# Patient Record
Sex: FEMALE | Race: WHITE | NOT HISPANIC OR LATINO | Employment: OTHER | URBAN - NONMETROPOLITAN AREA
[De-identification: names, ages, dates, MRNs, and addresses within clinical notes are randomized per-mention and may not be internally consistent; named-entity substitution may affect disease eponyms.]

---

## 2017-01-03 ENCOUNTER — HOSPITAL ENCOUNTER (OUTPATIENT)
Dept: RADIOLOGY | Facility: OTHER | Age: 62
End: 2017-01-03
Attending: ORTHOPAEDIC SURGERY

## 2017-01-03 ENCOUNTER — HISTORY (OUTPATIENT)
Dept: ORTHOPEDICS | Facility: OTHER | Age: 62
End: 2017-01-03

## 2017-01-03 ENCOUNTER — OFFICE VISIT - GICH (OUTPATIENT)
Dept: ORTHOPEDICS | Facility: OTHER | Age: 62
End: 2017-01-03

## 2017-01-03 DIAGNOSIS — S52.552D OTHER EXTRAARTICULAR FRACTURE OF LOWER END OF LEFT RADIUS, SUBSEQUENT ENCOUNTER FOR CLOSED FRACTURE WITH ROUTINE HEALING: ICD-10-CM

## 2017-01-10 ENCOUNTER — OFFICE VISIT - GICH (OUTPATIENT)
Dept: ORTHOPEDICS | Facility: OTHER | Age: 62
End: 2017-01-10

## 2017-01-10 ENCOUNTER — AMBULATORY - GICH (OUTPATIENT)
Dept: SCHEDULING | Facility: OTHER | Age: 62
End: 2017-01-10

## 2017-01-10 ENCOUNTER — COMMUNICATION - GICH (OUTPATIENT)
Dept: ORTHOPEDICS | Facility: OTHER | Age: 62
End: 2017-01-10

## 2017-01-10 DIAGNOSIS — S52.552D OTHER EXTRAARTICULAR FRACTURE OF LOWER END OF LEFT RADIUS, SUBSEQUENT ENCOUNTER FOR CLOSED FRACTURE WITH ROUTINE HEALING: ICD-10-CM

## 2017-01-11 ENCOUNTER — AMBULATORY - GICH (OUTPATIENT)
Dept: ORTHOPEDICS | Facility: OTHER | Age: 62
End: 2017-01-11

## 2017-01-11 DIAGNOSIS — S52.552D OTHER EXTRAARTICULAR FRACTURE OF LOWER END OF LEFT RADIUS, SUBSEQUENT ENCOUNTER FOR CLOSED FRACTURE WITH ROUTINE HEALING: ICD-10-CM

## 2017-01-17 ENCOUNTER — OFFICE VISIT - GICH (OUTPATIENT)
Dept: ORTHOPEDICS | Facility: OTHER | Age: 62
End: 2017-01-17

## 2017-01-17 ENCOUNTER — HOSPITAL ENCOUNTER (OUTPATIENT)
Dept: RADIOLOGY | Facility: OTHER | Age: 62
End: 2017-01-17
Attending: ORTHOPAEDIC SURGERY

## 2017-01-17 ENCOUNTER — HISTORY (OUTPATIENT)
Dept: ORTHOPEDICS | Facility: OTHER | Age: 62
End: 2017-01-17

## 2017-01-17 DIAGNOSIS — Z47.89 ENCOUNTER FOR OTHER ORTHOPEDIC AFTERCARE (CODE): ICD-10-CM

## 2017-01-17 DIAGNOSIS — S52.552D OTHER EXTRAARTICULAR FRACTURE OF LOWER END OF LEFT RADIUS, SUBSEQUENT ENCOUNTER FOR CLOSED FRACTURE WITH ROUTINE HEALING: ICD-10-CM

## 2017-01-30 ENCOUNTER — COMMUNICATION - GICH (OUTPATIENT)
Dept: ORTHOPEDICS | Facility: OTHER | Age: 62
End: 2017-01-30

## 2017-01-31 ENCOUNTER — OFFICE VISIT - GICH (OUTPATIENT)
Dept: ORTHOPEDICS | Facility: OTHER | Age: 62
End: 2017-01-31

## 2017-01-31 DIAGNOSIS — S52.532D CLOSED COLLES' FRACTURE OF LEFT RADIUS WITH ROUTINE HEALING: ICD-10-CM

## 2017-02-03 ENCOUNTER — AMBULATORY - GICH (OUTPATIENT)
Dept: ORTHOPEDICS | Facility: OTHER | Age: 62
End: 2017-02-03

## 2017-02-03 DIAGNOSIS — S52.552D OTHER EXTRAARTICULAR FRACTURE OF LOWER END OF LEFT RADIUS, SUBSEQUENT ENCOUNTER FOR CLOSED FRACTURE WITH ROUTINE HEALING: ICD-10-CM

## 2017-02-07 ENCOUNTER — HISTORY (OUTPATIENT)
Dept: ORTHOPEDICS | Facility: OTHER | Age: 62
End: 2017-02-07

## 2017-02-07 ENCOUNTER — HOSPITAL ENCOUNTER (OUTPATIENT)
Dept: RADIOLOGY | Facility: OTHER | Age: 62
End: 2017-02-07
Attending: ORTHOPAEDIC SURGERY

## 2017-02-07 ENCOUNTER — OFFICE VISIT - GICH (OUTPATIENT)
Dept: ORTHOPEDICS | Facility: OTHER | Age: 62
End: 2017-02-07

## 2017-02-07 DIAGNOSIS — S52.552D OTHER EXTRAARTICULAR FRACTURE OF LOWER END OF LEFT RADIUS, SUBSEQUENT ENCOUNTER FOR CLOSED FRACTURE WITH ROUTINE HEALING: ICD-10-CM

## 2017-02-07 DIAGNOSIS — S52.532D CLOSED COLLES' FRACTURE OF LEFT RADIUS WITH ROUTINE HEALING: ICD-10-CM

## 2017-02-13 ENCOUNTER — AMBULATORY - GICH (OUTPATIENT)
Dept: ORTHOPEDICS | Facility: OTHER | Age: 62
End: 2017-02-13

## 2017-02-13 DIAGNOSIS — S52.552D OTHER EXTRAARTICULAR FRACTURE OF LOWER END OF LEFT RADIUS, SUBSEQUENT ENCOUNTER FOR CLOSED FRACTURE WITH ROUTINE HEALING: ICD-10-CM

## 2017-02-27 ENCOUNTER — HISTORY (OUTPATIENT)
Dept: ORTHOPEDICS | Facility: OTHER | Age: 62
End: 2017-02-27

## 2017-02-27 ENCOUNTER — HOSPITAL ENCOUNTER (OUTPATIENT)
Dept: RADIOLOGY | Facility: OTHER | Age: 62
End: 2017-02-27
Attending: ORTHOPAEDIC SURGERY

## 2017-02-27 ENCOUNTER — OFFICE VISIT - GICH (OUTPATIENT)
Dept: ORTHOPEDICS | Facility: OTHER | Age: 62
End: 2017-02-27

## 2017-02-27 DIAGNOSIS — S52.552D OTHER EXTRAARTICULAR FRACTURE OF LOWER END OF LEFT RADIUS, SUBSEQUENT ENCOUNTER FOR CLOSED FRACTURE WITH ROUTINE HEALING: ICD-10-CM

## 2017-03-22 ENCOUNTER — AMBULATORY - GICH (OUTPATIENT)
Dept: ORTHOPEDICS | Facility: OTHER | Age: 62
End: 2017-03-22

## 2017-03-22 DIAGNOSIS — S52.552D OTHER EXTRAARTICULAR FRACTURE OF LOWER END OF LEFT RADIUS, SUBSEQUENT ENCOUNTER FOR CLOSED FRACTURE WITH ROUTINE HEALING: ICD-10-CM

## 2017-03-28 ENCOUNTER — HISTORY (OUTPATIENT)
Dept: ORTHOPEDICS | Facility: OTHER | Age: 62
End: 2017-03-28

## 2017-03-28 ENCOUNTER — HOSPITAL ENCOUNTER (OUTPATIENT)
Dept: RADIOLOGY | Facility: OTHER | Age: 62
End: 2017-03-28
Attending: ORTHOPAEDIC SURGERY

## 2017-03-28 ENCOUNTER — OFFICE VISIT - GICH (OUTPATIENT)
Dept: ORTHOPEDICS | Facility: OTHER | Age: 62
End: 2017-03-28

## 2017-03-28 DIAGNOSIS — S52.552D OTHER EXTRAARTICULAR FRACTURE OF LOWER END OF LEFT RADIUS, SUBSEQUENT ENCOUNTER FOR CLOSED FRACTURE WITH ROUTINE HEALING: ICD-10-CM

## 2017-05-26 ENCOUNTER — COMMUNICATION - GICH (OUTPATIENT)
Dept: FAMILY MEDICINE | Facility: OTHER | Age: 62
End: 2017-05-26

## 2017-05-26 DIAGNOSIS — K59.00 CONSTIPATION: ICD-10-CM

## 2017-10-25 ENCOUNTER — AMBULATORY - GICH (OUTPATIENT)
Dept: RADIOLOGY | Facility: OTHER | Age: 62
End: 2017-10-25

## 2017-10-25 DIAGNOSIS — R09.02 HYPOXEMIA: ICD-10-CM

## 2017-10-25 DIAGNOSIS — J98.6 DISORDER OF DIAPHRAGM: ICD-10-CM

## 2017-10-25 DIAGNOSIS — J47.0 BRONCHIECTASIS WITH ACUTE LOWER RESPIRATORY INFECTION (H): ICD-10-CM

## 2017-10-25 DIAGNOSIS — D89.9 DISORDER INVOLVING IMMUNE MECHANISM (H): ICD-10-CM

## 2017-10-25 DIAGNOSIS — R06.09 OTHER FORMS OF DYSPNEA: ICD-10-CM

## 2017-11-09 ENCOUNTER — HOSPITAL ENCOUNTER (OUTPATIENT)
Dept: RADIOLOGY | Facility: OTHER | Age: 62
End: 2017-11-09

## 2017-11-09 DIAGNOSIS — R06.09 OTHER FORMS OF DYSPNEA: ICD-10-CM

## 2017-11-09 DIAGNOSIS — J98.6 DISORDER OF DIAPHRAGM: ICD-10-CM

## 2017-11-09 DIAGNOSIS — J47.0 BRONCHIECTASIS WITH ACUTE LOWER RESPIRATORY INFECTION (H): ICD-10-CM

## 2017-11-09 DIAGNOSIS — D89.9 DISORDER INVOLVING IMMUNE MECHANISM (H): ICD-10-CM

## 2017-11-09 DIAGNOSIS — R09.02 HYPOXEMIA: ICD-10-CM

## 2017-12-13 ENCOUNTER — OFFICE VISIT - GICH (OUTPATIENT)
Dept: FAMILY MEDICINE | Facility: OTHER | Age: 62
End: 2017-12-13

## 2017-12-13 ENCOUNTER — HISTORY (OUTPATIENT)
Dept: FAMILY MEDICINE | Facility: OTHER | Age: 62
End: 2017-12-13

## 2017-12-13 DIAGNOSIS — L08.9 LOCAL INFECTION OF SKIN AND SUBCUTANEOUS TISSUE: ICD-10-CM

## 2018-01-02 NOTE — PROGRESS NOTES
"Patient Information     Patient Name MRN Sex Alicia Mena [id number] Female 1955      Progress Notes by Harvinder Mitchell DO at 1/3/2017  3:15 PM     Author:  Harvinder Mitchell DO Service:  (none) Author Type:  PHYS- Osteopathic     Filed:  1/3/2017  3:11 PM Encounter Date:  1/3/2017 Status:  Signed     :  Harvinder Mitchell DO (PHYS- Osteopathic)            PROGRESS NOTE    SUBJECTIVE:  Alicia Becker is here for recheck of a  left wrist.     HPI: 6 days status post closed reduction and casting of a left distal radius fracture   two-week status post left distal radius fracture with injury on 16.   The patient was hospitalized recently for pneumonia. Initial recommendation and plan for surgical intervention on the wrist was canceled because of pneumonia. At that time she underwent a closed reduction and short arm casting. She presents for recheck and x-ray today.   The patient has been doing well with the cast on. No major complaints. She has been moving the fingers. Elevating and icing as needed. .    Review of Systems:  Constitutional: Denies constitutional problems    PFSH:  No change in information. See earlier PFSH questionnaire completed by the patient on initial visit.    OBJECTIVE:  Visit Vitals       /72     Ht 1.6 m (5' 3\")     Wt 81.6 kg (180 lb)     BMI 31.89 kg/m2    Body mass index is 31.89 kg/(m^2).  General Appearance: Pleasant female in good appearance, mood and affect.  Alert and orientated times three ( time, date and location).    Left wrist/ Hand:  The short arm plaster cast is intact. Mild to moderate finger swelling. Active and passive motion of the fingers and thumb is comfortable.    Radiographic images where independently reviewed and discussed with the patient.   X RAY: X-rays of the left wrist today are compared to prior x-rays. Overall she still has good alignment of the reduction. It appears to be holding well at this point. The transverse fracture pattern with " dorsal comminution on the radius is noted.    ASSESSMENT:  Two-week status post left distal radius fracture, extra-articular with dorsal comminution  approximately 1 week status post closed reduction of the distal radius fracture.  Overall the reduction appears to be stable and in good position still.    PLAN:  Discussion included review of x-rays  continue with the current cast.  Discussed with the patient I will be out of town next week. If she has any cast problems she can still call the office.  Otherwise, plan to recheck and x-ray of the left wrist in 2 weeks. X-ray with the cast on.  Discussed with the patient she can still experience some settling or slight angulation of the fracture pattern but hopefully it will hold in good position.  Questions answered.    Harvinder Mitchell D.O.  Orthopedic Surgeon    [address] [address]  Phone [phone]  Fax [phone]    3:07 PM 1/3/2017

## 2018-01-02 NOTE — TELEPHONE ENCOUNTER
Patient Information     Patient Name MRN Alicia Xie [id number] Female 1955      Telephone Encounter by Stefanie Singh at 1/10/2017 11:01 AM     Author:  Stefanie Singh Service:  (none) Author Type:  (none)     Filed:  1/10/2017 11:04 AM Encounter Date:  1/10/2017 Status:  Signed     :  Stefanie Singh            Patient called with concerns that she is having pain in her thumb.  She stated that the pain is not getting any better even with ice.  I let her know that Dr. Mitchell was out this week.  She is wanting it checked this week.  Please advise.  Stefanie Singh LPN .......1/10/2017 11:03 AM

## 2018-01-02 NOTE — NURSING NOTE
Patient Information     Patient Name MRN Sex Alicia Mena [id number] Female 1955      Nursing Note by Kelsi Hinojosa at 1/3/2017  3:15 PM     Author:  Kelsi Hinojosa Service:  (none) Author Type:  (none)     Filed:  1/3/2017  2:22 PM Encounter Date:  1/3/2017 Status:  Signed     :  Kelsi Hinojosa            Pt presents for a follow up on her left wrist fracture, doi     Kelsi Hinojosa CMA 1/3/2017 2:20 PM

## 2018-01-03 NOTE — PROGRESS NOTES
Patient Information     Patient Name MRN Sex Alicia Mena [id number] Female 1955      Progress Notes by Stefanie Singh at 1/10/2017  2:00 PM     Author:  Stefanie Singh Service:  (none) Author Type:  (none)     Filed:  1/10/2017  2:40 PM Encounter Date:  1/10/2017 Status:  Signed     :  Stefanie Singh            Patient came in because she had concerns about her thumb hurting her.  She stated that it started to hurt yesterday more and that it did not get better with using elevation and ice.  Had her come in to check the cast to make sure that wasn't  Causing her the pain.  The cast was not too tight or too loose.  Capillary refill normal.  Patient did state that the pain was better now after taking Tylenol before coming in.  I had Dr. Gold talk to her and will have her follow up with Dr. Mitchell next week when he returns.  Told her to continue to ice, elevate, and use the Tylenol as needed for the pain.  Stefanie Singh LPN .......1/10/2017 2:37 PM

## 2018-01-03 NOTE — PROGRESS NOTES
Patient Information     Patient Name MRN Sex Alicia Mena [id number] Female 1955      Progress Notes by Harvinder Mitchell DO at 2017  3:15 PM     Author:  Harvinder Mitchell DO Service:  (none) Author Type:  PHYS- Osteopathic     Filed:  2017  4:21 PM Encounter Date:  2017 Status:  Signed     :  Harvinder Mitchell DO (PHYS- Osteopathic)            PROGRESS NOTE    SUBJECTIVE:  Alicia Becker is here for recheck of a  left wrist.     HPI: This patient had a left wrist distal radius fracture on . Initial plan was for surgical intervention. She was hospitalized with pneumonia and underwent a closed reduction and casting of the wrist on . She presents for recheck and x-ray. . she is having cast irritation at the thumb. The cast has slid down distally. Notices swelling of the thumb and some intermittent tingling.     Review of Systems:  Constitutional: Denies constitutional problems    PFSH:  No change in information. See earlier PFSH questionnaire completed by the patient on initial visit.    OBJECTIVE:  Visit Vitals       /78     Pulse 88    There is no height or weight on file to calculate BMI.  General Appearance: Pleasant female in good appearance, mood and affect.  Alert and orientated times three ( time, date and location).    Left wrist/ Hand:  The cast has slid distally about an inch to an inch and a half. It is causing pressure on the dorsal aspect of the thumb with associated swelling.    Radiographic images where independently reviewed and discussed with the patient.   X RAY: X-rays today in the cast demonstrate similar position of the reduction of the distal radius. Dorsal comminution.     ASSESSMENT:  3 weeks status post close reduction and casting of a left wrist distal radius fracture.  History of injury about 4 weeks ago on 16.  Overall position of the reduction looks similar.  Cast irritation. The cast has migrated distally and causing pressure and  has looseness to it.    PLAN:  I remove the cast. The patient was held with the wrist volar flexed during the procedure as a new short arm fiberglass cast was applied. It was molded and the patient appeared comfortable.  Plan to have the patient come back in 3 weeks. Cast removal and x-ray of the wrist.  She will see Dr. Gold in my absence at that time. I discussed with the patient I will be off on medical leave for 3 months beginning and of January.  Questions answered.    Harvinder Mitchell D.O.  Orthopedic Surgeon    [address] [address]  Phone [phone]  Fax [phone]    4:17 PM 1/17/2017

## 2018-01-03 NOTE — NURSING NOTE
Patient Information     Patient Name MRN Sex Alicia Mena [id number] Female 1955      Nursing Note by Stefanie Singh at 1/10/2017  2:00 PM     Author:  Stefanie Singh Service:  (none) Author Type:  (none)     Filed:  1/10/2017  2:40 PM Encounter Date:  1/10/2017 Status:  Signed     :  Stefanie Singh            Patient came in because she had concerns about her thumb hurting her.  She stated that it started to hurt yesterday more and that it did not get better with using elevation and ice.  Had her come in to check the cast to make sure that wasn't  Causing her the pain.  The cast was not too tight or too loose.  Capillary refill normal.  Patient did state that the pain was better now after taking Tylenol before coming in.  I had Dr. Gold talk to her and will have her follow up with Dr. Mitchell next week when he returns.  Told her to continue to ice, elevate, and use the Tylenol as needed for the pain.  Stefanie Singh LPN .......1/10/2017 2:37 PM

## 2018-01-03 NOTE — NURSING NOTE
Patient Information     Patient Name MRN Sex Alicia Mena [id number] Female 1955      Nursing Note by Stefanie Singh at 2017  3:15 PM     Author:  Stefanie Singh Service:  (none) Author Type:  (none)     Filed:  2017  3:15 PM Encounter Date:  2017 Status:  Signed     :  Stefanie Singh            Patient is here for her follow up on her left wrist fx.  DOI: 17  Stefanie Singh LPN .......2017 3:14 PM

## 2018-01-03 NOTE — PROGRESS NOTES
Patient Information     Patient Name MRN Sex Alicia Mena [id number] Female 1955      Progress Notes by Stefanie Singh at 2017  1:00 PM     Author:  Stefanie Singh Service:  (none) Author Type:  (none)     Filed:  2017  2:09 PM Encounter Date:  2017 Status:  Signed     :  Stefanie Singh            Patient came in today because she was having discomfort around her thumb.  She stated that the cast felt like it was rubbing on her skin and making it raw feeling.  Trimmed cast around base of left thumb to keep it from rubbing anymore.  It had rubbed the base of her thumb and made the skin reddened.  Skin intact.  Mole skin placed around thumb to hold padding and to keep comfortable til she comes in next week.   Stefanie Singh LPN .......2017 2:06 PM

## 2018-01-03 NOTE — PROGRESS NOTES
Patient Information     Patient Name MRN Sex Alicia Mena [id number] Female 1955      Progress Notes by Paul Gold DO at 2017  3:15 PM     Author:  Paul Gold DO Service:  (none) Author Type:  Physician     Filed:  2017  5:34 PM Encounter Date:  2017 Status:  Signed     :  Paul Gold DO (Physician)            PROGRESS NOTE    SUBJECTIVE:  Alicia Becker is here for evaluation in regards to her left wrist. I am following the patient for Dr. Mitchell in his absence. Patient suffered a left wrist fracture on 16. She had underwent closed reduction and casting and has been followed since that time. She is getting along relatively well. She is now 6 weeks out from left wrist injury.    OBJECTIVE:  Visit Vitals       /82     Pulse 88     Wt 81.6 kg (180 lb)     BMI 31.89 kg/m2    Body mass index is 31.89 kg/(m^2).    General Appearance: Pleasant female in good appearance, mood and affect.  Alert and orientated times three ( time, date and location).    Skin: Dryness is noted but no breakdown of the skin.    Wrist:  Positive palpable pain.  Motion: There is stiffness into the wrist due to being casted.  Tinel's: positive  Phalen's: Not tested.  Compression test: positive    Shoulder:  Motion: full    Elbow:  Flexion: Normal  Extension: Normal    Hand:  Thenar wasting: no  Hypothenar wasting: no  Sensation: Abnormal into the median nerve distribution especially thumb.  Radial and ulnar blood flow:  Normal    Eyes: Pupils are round.    Ears: Hearing: Intact    Heart: regular rate and rhythm    Lungs: Clear.     Radiographic images from ,  where independently reviewed and discussed with the patient.     X-rays today show a fracture of the distal radius that is intra-articular on the left. There is callus formation.    Exam: XR WRIST 3 VIEWS LEFT  History: Other closed extra-articular fracture of distal end of left radius with routine  healing, subsequent encounter  Technique: 3 views.  Findings: Comparison is made with the prior exam dated 01/17/2017. The plaster cast has been removed.  There is a healing fracture through the distal radius without significant change in position or alignment.    Impression: Healing distal radius fracture.  Electronically Signed By: Beryl Maradiaga M.D. on 2/7/2017 3:32 PM    PROCEDURE: XR WRIST 3 VIEWS LEFT  HISTORY: Other closed extra-articular fracture of distal end of left radius with routine healing, subsequent encounter.  COMPARISON: 01/03/2017  TECHNIQUE: 3 views of the left wrist were obtained.  FINDINGS: Comminuted fracture of the distal radius is again identified, with increasing lucency at the fracture site, consistent with interval healing response. There has been no change in alignment. A cast is present which obscures underlying detail.  IMPRESSION: Healing distal radius fracture with no change in alignment.  Electronically Signed By: Leidy Grant M.D. on 1/17/2017 3:17 PM    ASSESSMENT     Intra-articular fracture of the distal left radius (DOS 12/27/16).    PLAN:    I discussed conservative and surgical options at this time continue with casting.  She is to elevate, ice often and rest it.  She will follow-up in approximately 3 weeks for cast removal and x-ray first.  Questions and concerns answered.    PROCEDURAL NOTE:    Risks, benefits, conservative, surgical, and alternatives of treatment were thoroughly outlined. No guarantees were given. Risks which do include, but are not limited to:  Scar, infection, decreased motion, damage to blood vessels, nerves and tendons, failure or need for further treatment, reaction to medications and anesthesia, blood clots, and the possibility of death where discussed.  She did verbalize an understanding. All questions and concerns were addressed.    Patient was placed into a left short arm fiberglass cast that was molded appropriately.  She  tolerated the  procedure well without complications.     Cast care instructions where given.     Follow up as ordered.    Paul Gold D.O.  Orthopaedic Surgeon    [facility]  [address] [place] [address]  Phone [phone] (KNEE)  Fax [phone]    This document was created using computer generated templates and voice activated software.    5:30 PM 2/7/2017

## 2018-01-03 NOTE — TELEPHONE ENCOUNTER
Patient Information     Patient Name MRN Sex Alicia Mena [id number] Female 1955      Telephone Encounter by Stefanie Singh at 2017  3:05 PM     Author:  Stefanie Singh Service:  (none) Author Type:  (none)     Filed:  2017  3:16 PM Encounter Date:  2017 Status:  Signed     :  Stefanie Singh            Called patient back after talking with Dr. Gold.  He stated that she should come in to have it checked and cut the cast a little more by the thumb.  She was unable to come in until tomorrow.  The roads were to bad for her to come in today.  Stefanie Singh LPN .......2017 3:06 PM

## 2018-01-03 NOTE — NURSING NOTE
Patient Information     Patient Name MRN Sex Alicia Mena [id number] Female 1955      Nursing Note by Nicolás Hidalgo at 2017  3:15 PM     Author:  Nicolás Hidalgo Service:  (none) Author Type:  (none)     Filed:  2017  3:06 PM Encounter Date:  2017 Status:  Signed     :  Nicolás Hidalgo            Pt here for follow up, left wrist Fx. DOI .  Nicolás Hidalgo LPN .............2017  3:06 PM

## 2018-01-03 NOTE — NURSING NOTE
Patient Information     Patient Name MRN Sex Alicia Mena [id number] Female 1955      Nursing Note by Stefanie Singh at 2017  1:00 PM     Author:  Stefanie Singh Service:  (none) Author Type:  (none)     Filed:  2017  2:09 PM Encounter Date:  2017 Status:  Signed     :  Stefanie Singh            Patient came in today because she was having discomfort around her thumb.  She stated that the cast felt like it was rubbing on her skin and making it raw feeling.  Trimmed cast around base of left thumb to keep it from rubbing anymore.  It had rubbed the base of her thumb and made the skin reddened.  Skin intact.  Mole skin placed around thumb to hold padding and to keep comfortable til she comes in next week.   Stefanie Singh LPN .......2017 2:06 PM

## 2018-01-03 NOTE — TELEPHONE ENCOUNTER
Patient Information     Patient Name MRN Sex Alicia Mena [id number] Female 1955      Telephone Encounter by Kelsi Hinojosa at 1/10/2017 11:28 AM     Author:  Kelsi Hinojosa Service:  (none) Author Type:  (none)     Filed:  1/10/2017 11:29 AM Encounter Date:  1/10/2017 Status:  Signed     :  Kelsi Hinojosa            Pt will come in this afternoon for a cast check. Dr. Gold may see pt if there are concerns.    Kelsi Hinojosa CMA 1/10/2017 11:29 AM

## 2018-01-03 NOTE — NURSING NOTE
Patient Information     Patient Name MRN Sex Alicia Mena [id number] Female 1955      Nursing Note by Stefanie Singh at 2017  1:45 PM     Author:  Stefanie Singh Service:  (none) Author Type:  (none)     Filed:  2017  1:35 PM Encounter Date:  2017 Status:  Signed     :  Stefanie Singh            Patient is here for her follow up on her left wrist.  DOI: 17  Stefanie Singh LPN .......2017 1:35 PM

## 2018-01-03 NOTE — PROGRESS NOTES
Patient Information     Patient Name MRN Sex Alicia Mena [id number] Female 1955      Progress Notes by Paul Gold DO at 2017  1:45 PM     Author:  Paul Gold DO Service:  (none) Author Type:  Physician     Filed:  2017  2:14 PM Encounter Date:  2017 Status:  Signed     :  Paul Gold DO (Physician)            PROGRESS NOTE    SUBJECTIVE:  Alicia Becker is here for evaluation in regards to her left wrist. She is now about 9 weeks out from a fracture of the distal left radius.  She feels the stiffness having come out of her cast today and has expected discomfort. I am seeing the patient or Dr. Mitchell during his absence.    OBJECTIVE:  Visit Vitals       /70     Pulse 88     Wt 81.6 kg (180 lb)     BMI 31.89 kg/m2    Body mass index is 31.89 kg/(m^2).    General Appearance: Pleasant female in good appearance, mood and affect.  Alert and orientated times three ( time, date and location).    Skin: Dryness is noted but no breakdown of the skin.    Wrist:  Positive palpable pain, minimal.  Motion: There is stiffness into the wrist due to being casted.  Tinel's: positive  Phalen's: Not tested.  Compression test: positive    Shoulder:  Motion: full    Elbow:  Flexion: Normal  Extension: Normal    Hand:  Thenar wasting: no  Hypothenar wasting: no  Sensation: Abnormal into the median nerve distribution especially thumb, this has improved.  Radial and ulnar blood flow:  Normal    Eyes: Pupils are round.    Ears: Hearing: Intact    Heart: regular rate and rhythm    Lungs: Clear.     Radiographic images from ,  where independently reviewed and discussed with the patient.     X-rays today show a fracture of the distal radius that is intra-articular on the left. There is callus formation.    Exam: XR WRIST 3 VIEWS LEFT  History: Other closed extra-articular fracture of distal end of left radius with routine healing, subsequent  encounter  Technique: 3 views.  Findings: Comparison is made with the prior exam dated 01/17/2017. The plaster cast has been removed.  There is a healing fracture through the distal radius without significant change in position or alignment.    Impression: Healing distal radius fracture.  Electronically Signed By: Beryl Maradiaga M.D. on 2/7/2017 3:32 PM    PROCEDURE: XR WRIST 3 VIEWS LEFT  HISTORY: Other closed extra-articular fracture of distal end of left radius with routine healing, subsequent encounter.  COMPARISON: 01/03/2017  TECHNIQUE: 3 views of the left wrist were obtained.  FINDINGS: Comminuted fracture of the distal radius is again identified, with increasing lucency at the fracture site, consistent with interval healing response. There has been no change in alignment. A cast is present which obscures underlying detail.  IMPRESSION: Healing distal radius fracture with no change in alignment.  Electronically Signed By: Leidy Grant M.D. on 1/17/2017 3:17 PM    ASSESSMENT     Intra-articular fracture of the distal left radius (DOS 12/27/16).    PLAN:    I discussed conservative and surgical options at this time continue with conservative measures which will include early range of motion exercises and a carpal tunnel splint to be used sparingly.  She is to elevate, ice often and rest it.  She will follow-up in approximately 4 weeks for x-ray first.  She was given the instructions written out for range of motion and myself.  Questions and concerns answered.    Paul Gold D.O.  Orthopaedic Surgeon    [facility] and [address] [address]  Phone [phone] (KNEE)  Fax [phone]    This document was created using computer generated templates and voice activated software.    2:11 PM 2/27/2017

## 2018-01-04 NOTE — NURSING NOTE
Patient Information     Patient Name MRN Sex Alicia Mena [id number] Female 1955      Nursing Note by Kelsi Hinojosa at 3/28/2017  2:30 PM     Author:  Kelsi Hinojosa Service:  (none) Author Type:  (none)     Filed:  3/28/2017  2:08 PM Encounter Date:  3/28/2017 Status:  Signed     :  Kelsi Hinojosa            Pt presents for a follow up on her left wrist fracture. DOI  Dr. Mitchell pt.     Kelsi Hinojosa CMA 3/28/2017 2:07 PM

## 2018-01-04 NOTE — PROGRESS NOTES
Patient Information     Patient Name MRN Sex Alicia Mena [id number] Female 1955      Progress Notes by Paul Gold DO at 3/28/2017  2:30 PM     Author:  Paul Gold DO Service:  (none) Author Type:  Physician     Filed:  3/28/2017  2:46 PM Encounter Date:  3/28/2017 Status:  Signed     :  Paul Gold DO (Physician)            PROGRESS NOTE    SUBJECTIVE:  Alicia Becker is here for evaluation in regards to his left wrist. She is 14 weeks since suffering a fracture of the left radius. I have been following the patient for Dr. Mitchell. She has been doing well has been utilizing her splint a little bit more than I would've hoped that she was concerned about overdoing it so it's not the end of the world. She has some stiffness she took a tumble and irritated her left shoulder. Otherwise no complaints.    OBJECTIVE:  Visit Vitals       /76     Pulse 76     Wt 81.6 kg (180 lb)     BMI 31.89 kg/m2    Body mass index is 31.89 kg/(m^2).    General Appearance: Pleasant female in good appearance, mood and affect.  Alert and orientated times three ( time, date and location).    Skin: Dryness is noted but no breakdown of the skin.    Wrist:  No palpable pain.  Motion: There is stiffness into the wrist, but this has improved with her motion exercises.  Tinel's: positive  Phalen's: Not tested.  Compression test: positive    Shoulder:  Motion: full    Elbow:  Flexion: Normal  Extension: Normal    Hand:  Thenar wasting: no  Hypothenar wasting: no  Sensation: Abnormal into the median nerve distribution especially thumb, this has improved.  Radial and ulnar blood flow:  Normal    Eyes: Pupils are round.    Ears: Hearing: Intact    Heart: regular rate and rhythm    Lungs: Clear.     Radiographic images from , , 3/28 where independently reviewed and discussed with the patient.     Today's x-rays do show increased healing of the left distal radius. Remodeling is  occurring.    PROCEDURE: XR WRIST 3 VIEWS LEFT  HISTORY: Other closed extra-articular fracture of distal end of left radius with routine healing, subsequent encounter.  COMPARISON: 02/27/2017  TECHNIQUE: 3 views of the left wrist were obtained.  FINDINGS: There is a healing, transverse fracture of the distal radius. There is increasing sclerosis at the fracture site. Fracture line is still visible. There has been no change in alignment.  IMPRESSION: Healing distal radius fracture.  Electronically Signed By: Leidy Grant M.D. on 3/28/2017 2:37 PM    Exam: XR WRIST 3 VIEWS LEFT  History: Other closed extra-articular fracture of distal end of left radius with routine healing, subsequent encounter  Technique: 3 views.  Findings: Comparison is made with the prior exam dated 01/17/2017. The plaster cast has been removed.  There is a healing fracture through the distal radius without significant change in position or alignment.    Impression: Healing distal radius fracture.  Electronically Signed By: Beryl Maradiaga M.D. on 2/7/2017 3:32 PM    PROCEDURE: XR WRIST 3 VIEWS LEFT  HISTORY: Other closed extra-articular fracture of distal end of left radius with routine healing, subsequent encounter.  COMPARISON: 01/03/2017  TECHNIQUE: 3 views of the left wrist were obtained.  FINDINGS: Comminuted fracture of the distal radius is again identified, with increasing lucency at the fracture site, consistent with interval healing response. There has been no change in alignment. A cast is present which obscures underlying detail.  IMPRESSION: Healing distal radius fracture with no change in alignment.  Electronically Signed By: Leidy Grnat M.D. on 1/17/2017 3:17 PM    ASSESSMENT     Intra-articular fracture of the distal left radius (DOS 12/27/16).    PLAN:    I discussed conservative and surgical options at this time continue with conservative measures.  She is to elevate, ice often and rest it as needed.  She will  continue to work on her range of motion exercises.  Questions and concerns answered.  Follow-up as needed.    Paul Gold D.O.  Orthopaedic Surgeon    [facility]  [address] [place] [address]  Phone [phone] (KNEE)  Fax [phone]    This document was created using computer generated templates and voice activated software.    2:43 PM 3/28/2017

## 2018-01-05 NOTE — TELEPHONE ENCOUNTER
Patient Information     Patient Name MRN Sex Alicia Mena [id number] Female 1955      Telephone Encounter by Marielena Schrader RN at 2017  3:25 PM     Author:  Marielena Schrader RN Service:  (none) Author Type:  NURS- Registered Nurse     Filed:  2017  3:30 PM Encounter Date:  2017 Status:  Signed     :  Marielena Schrader RN (NURS- Registered Nurse)            Not a gi patient.  Unable to complete prescription refill per RN Medication Refill Policy.................... MARIELENA SCHRADER RN ....................  2017   3:25 PM

## 2018-01-27 VITALS
BODY MASS INDEX: 31.89 KG/M2 | DIASTOLIC BLOOD PRESSURE: 82 MMHG | DIASTOLIC BLOOD PRESSURE: 78 MMHG | WEIGHT: 180 LBS | HEART RATE: 88 BPM | SYSTOLIC BLOOD PRESSURE: 142 MMHG | HEART RATE: 88 BPM | SYSTOLIC BLOOD PRESSURE: 130 MMHG

## 2018-01-27 VITALS
BODY MASS INDEX: 31.89 KG/M2 | WEIGHT: 180 LBS | SYSTOLIC BLOOD PRESSURE: 118 MMHG | DIASTOLIC BLOOD PRESSURE: 76 MMHG | HEART RATE: 76 BPM

## 2018-01-27 VITALS
BODY MASS INDEX: 31.89 KG/M2 | HEART RATE: 88 BPM | WEIGHT: 180 LBS | SYSTOLIC BLOOD PRESSURE: 128 MMHG | DIASTOLIC BLOOD PRESSURE: 70 MMHG

## 2018-01-27 VITALS
BODY MASS INDEX: 31.89 KG/M2 | HEIGHT: 63 IN | DIASTOLIC BLOOD PRESSURE: 72 MMHG | SYSTOLIC BLOOD PRESSURE: 130 MMHG | WEIGHT: 180 LBS

## 2018-01-31 ENCOUNTER — DOCUMENTATION ONLY (OUTPATIENT)
Dept: FAMILY MEDICINE | Facility: OTHER | Age: 63
End: 2018-01-31

## 2018-01-31 RX ORDER — MONTELUKAST SODIUM 10 MG/1
10 TABLET ORAL AT BEDTIME
Status: ON HOLD | COMMUNITY
End: 2023-02-16

## 2018-01-31 RX ORDER — IPRATROPIUM BROMIDE AND ALBUTEROL SULFATE 2.5; .5 MG/3ML; MG/3ML
SOLUTION RESPIRATORY (INHALATION)
Status: ON HOLD | COMMUNITY
End: 2019-01-27

## 2018-01-31 RX ORDER — ASCORBIC ACID 500 MG
500 TABLET ORAL DAILY
COMMUNITY

## 2018-01-31 RX ORDER — LACOSAMIDE 200 MG/1
200 TABLET ORAL 2 TIMES DAILY
COMMUNITY
Start: 2016-10-05

## 2018-01-31 RX ORDER — PREDNISONE 5 MG/1
TABLET ORAL
COMMUNITY

## 2018-01-31 RX ORDER — AMLODIPINE BESYLATE 10 MG/1
0.5 TABLET ORAL DAILY
Status: ON HOLD | COMMUNITY
Start: 2016-11-15 | End: 2019-01-23

## 2018-01-31 RX ORDER — PREDNISONE 1 MG/1
TABLET ORAL
COMMUNITY

## 2018-01-31 RX ORDER — OMEPRAZOLE 40 MG/1
1 CAPSULE, DELAYED RELEASE ORAL DAILY
Status: ON HOLD | COMMUNITY
Start: 2016-11-08 | End: 2019-01-23

## 2018-01-31 RX ORDER — METFORMIN HCL 500 MG
1000 TABLET, EXTENDED RELEASE 24 HR ORAL
Status: ON HOLD | COMMUNITY
Start: 2016-12-27 | End: 2019-01-23

## 2018-01-31 RX ORDER — OXYMETAZOLINE HYDROCHLORIDE 0.05 G/100ML
1 SPRAY NASAL
Status: ON HOLD | COMMUNITY
End: 2019-01-23

## 2018-01-31 RX ORDER — LOSARTAN POTASSIUM 100 MG/1
100 TABLET ORAL DAILY
Status: ON HOLD | COMMUNITY
Start: 2016-11-15 | End: 2023-02-16

## 2018-01-31 RX ORDER — AMOXICILLIN 250 MG
1 CAPSULE ORAL 2 TIMES DAILY PRN
Status: ON HOLD | COMMUNITY
Start: 2016-12-28 | End: 2019-01-23

## 2018-01-31 RX ORDER — ALBUTEROL SULFATE 90 UG/1
1-2 AEROSOL, METERED RESPIRATORY (INHALATION) EVERY 4 HOURS PRN
COMMUNITY
Start: 2016-12-10

## 2018-01-31 RX ORDER — POTASSIUM CHLORIDE 1500 MG/1
1 TABLET, EXTENDED RELEASE ORAL 2 TIMES DAILY WITH MEALS
COMMUNITY
Start: 2016-09-21

## 2018-01-31 RX ORDER — OXCARBAZEPINE 600 MG/1
2 TABLET, FILM COATED ORAL 2 TIMES DAILY
COMMUNITY
Start: 2016-11-15

## 2018-01-31 RX ORDER — ASPIRIN 81 MG/1
81 TABLET, CHEWABLE ORAL DAILY
COMMUNITY

## 2018-01-31 RX ORDER — SIMVASTATIN 20 MG
1 TABLET ORAL AT BEDTIME
COMMUNITY
Start: 2016-10-18

## 2018-01-31 RX ORDER — FOLIC ACID 1 MG/1
1 TABLET ORAL DAILY
COMMUNITY
Start: 2016-11-15

## 2018-01-31 RX ORDER — VITAMIN E 268 MG
400 CAPSULE ORAL DAILY
COMMUNITY

## 2018-01-31 RX ORDER — ACETAMINOPHEN 500 MG
TABLET ORAL
COMMUNITY

## 2018-01-31 RX ORDER — SODIUM CHLORIDE 1 G/1
4 TABLET ORAL
COMMUNITY
Start: 2016-11-23

## 2018-02-09 VITALS
RESPIRATION RATE: 20 BRPM | BODY MASS INDEX: 32.11 KG/M2 | HEART RATE: 70 BPM | HEIGHT: 63 IN | SYSTOLIC BLOOD PRESSURE: 128 MMHG | WEIGHT: 181.2 LBS | DIASTOLIC BLOOD PRESSURE: 82 MMHG | TEMPERATURE: 98.4 F

## 2018-02-12 NOTE — PROGRESS NOTES
"Patient Information     Patient Name MRN Sex Alicia Mena [id number] Female 1955      Progress Notes by Danielle Jose NP at 2017  2:45 PM     Author:  Danielle Jose NP Service:  (none) Author Type:  PHYS- Nurse Practitioner     Filed:  2017  9:46 AM Encounter Date:  2017 Status:  Signed     :  Danielle Jose NP (PHYS- Nurse Practitioner)            HPI:  Nursing Notes:   Komal Hernandez  2017  2:38 PM  Signed  Patient presents to the clinic for infected second toe on left foot. Has been infected for a while now. Had it drained in [place] on  and now it's \"puss filled, white and painful\", again.   Komal Hernandez LPN............................ 2017 2:07 PM      Alicia Becker is a 62 y.o. female who presents to clinic today for second toe on left foot has a callus, thinks it may be infected. This callus was recently drained cultured and found to be infected with staph. Was treated for infection (not certain which antibiotic) and toe sort of improved but has been draining fluid since that time. Toe is getting increasingly sore and painful, with redness around callused area. History of diabetes and vasculitis, takes prednisone and methotrexate. Denies fevers.    No past medical history on file.  No past surgical history on file.  Social History     Substance Use Topics       Smoking status: Former Smoker     Smokeless tobacco: Never Used     Alcohol use No     Current Outpatient Prescriptions       Medication  Sig Dispense Refill     acetaminophen (TYLENOL EXTRA STRGTH) 500 mg tablet Take 2 tablets by mouth every night at bedtime, also may take 2 tablets by mouth every 6 hours as needed for pain. Max acetaminophen dose: 4000mg in 24 hrs.       albuterol-ipratropium (DUONEB) (2.5-0.5 mg) in 3 mL NEBULIZATION solution Inhale one neb by mouth once daily. When patient has a respiratory illness may increase to 2-3 times " "daily.       amLODIPine (NORVASC) 10 mg tablet TAKE 1/2 TABLET BY MOUTH DAILY.  3     ascorbic acid, vitamin C, (VITAMIN C) 500 mg tablet Take 500 mg by mouth 2 times daily.       aspirin chewable 81 mg chewable tablet Take 81 mg by mouth once daily with a meal.       B-D TB SYRINGE 1CC/25GX5/8 1 mL 25 gauge x 5/8\" USE ONE NEW SYRINGE WITH EACH INJECTION EVERY 7 DAYS  1     CALCIUM CARBONATE/VITAMIN D3 (CALCIUM 500 + D ORAL) Take 1 tablet by mouth 2 times daily.       CONTOUR NEXT STRIPS strip USE AS DIRECTED TESTING 3 TO 4 TIMES DAILY  2     folic acid 1 mg tablet Take 1 mg by mouth once daily.  3     losartan (COZAAR) 100 mg tablet Take 100 mg by mouth once daily.  2     metFORMIN (GLUCOPHAGE XR) 500 mg Extended-Release tablet   0     methotrexate 25 mg/mL injection INJECT 0.8ML UNDER THE SKIN ONE TIME A WEEK  1     MICROLET LANCET USE AS DIRECTED TESTING FOUR TIMES DAILY  0     montelukast (SINGULAIR) 10 mg tablet Take 10 mg by mouth at bedtime.       MULTIVIT,TH W-CA,FE,OTH MIN (MULTIVITAMIN AND MINERAL ORAL) Take 1 tablet by mouth once daily.       NOVOLOG MIX 70-30 100 unit/mL (70-30) FLEXPEN   4     omega-3 fatty acids-vitamin E (FISH OIL) 1,000 mg cap Take 1 capsule by mouth 2 times daily.       omeprazole (PRILOSEC) 40 mg Delayed-Release capsule Take 1 capsule by mouth one time a day. Take before meals. Do not crush.  3     OXcarbazepine (TRILEPTAL) 600 mg tablet TAKE 2 TABLETS BY MOUTH TWICE DAILY.  2     oxymetazoline (AFRIN, OXYMETAZOLINE,) 0.05 % nasal spray Inhale 1 Spray in the nostril(s) 2 times daily if needed for Nasal Congestion (May use up to 3 days maximum).       potassium chloride (KLOR-CON M20) 20 mEq Extended-Release tablet Take 1 Tab by mouth two times a day with meals. Do not crush.  2     predniSONE (DELTASONE) 1 mg tablet Take 3-4 capsules by mouth once daily with 5 mg tablet to equate either 8 mg or 9 mg once daily alternating every other day. Continue with taper as directed.       " "predniSONE (DELTASONE) 5 mg tablet Take 1 capsules by mouth once daily with 1 mg tablets to equate either 8 mg or 9 mg once daily alternating every other day. Continue with taper as directed.       sennosides-docusate, 8.6-50 mg, (SENOKOT S) 8.6-50 mg tablet Take 1 tablet by mouth 2 times daily if needed for Constipation. 180 tablet 1     simvastatin (ZOCOR) 20 mg tablet Take 1 Tab by mouth at bedtime.  3     sodium chloride (SALINE NASAL) 0.65 % nasal solution Inhale 1 Spray in the nostril(s) at bedtime if needed for Nasal Dryness.       Sodium Chloride 1,000 mg TbSO TAKE 4 TABLETS BY MOUTH THREE TIMES DAILY WITH MEALS  10     VENTOLIN HFA 90 mcg/actuation inhaler INHALE 1-2 PUFFS INTO LUNGS EVERY 4 HOURS AS NEEDED SHORTNESS OF BREATH. SHAKE BEFORE USING  3     VIMPAT 200 mg tab tablet Take 200 mg by mouth 2 times daily.  2     VITAMIN E, DL,TOCOPHERYL ACET, (VITAMIN E, DL, ACETATE,) 400 unit capsule Take 400 Units by mouth 2 times daily.       No current facility-administered medications for this visit.      Medications have been reviewed by me and are current to the best of my knowledge and ability.    Allergies      Allergen   Reactions     Ciprofloxacin  Seizures     Levofloxacin  Seizures     Quinolones  Seizures     Carbamazepine  Other - Describe In Comment Field     Ineffective for seizure control.      Depakote [Divalproex]  Other - Describe In Comment Field     Ineffective for seizure control.       Keppra [Levetiracetam]  Other - Describe In Comment Field     Ineffective at 750 mg BID for seizure control.       Lisinopril  Yeast Infection     Niacin  *Unknown     Persistant flushing      Sulfa (Sulfonamide Antibiotics)  *Unknown       ROS:  Refer to HPI    /82 (Cuff Site: Left Arm, Position: Sitting, Cuff Size: Adult Regular)  Pulse 70  Temp 98.4  F (36.9  C) (Tympanic)   Resp 20  Ht 1.6 m (5' 3\")  Wt 82.2 kg (181 lb 3.2 oz)  Breastfeeding? No  BMI 32.1 kg/m2    EXAM:  General Appearance: " Well appearing female, appropriate appearance for age. No acute distress  Cardiovascular: 2+ pedal pulse left foot   Dermatological: 1 cm callused area on dorsal second toe left foot, surrounded by erythema, somewhat warm to touch, tender to touch  Psychological: normal affect, alert and pleasant    ASSESSMENT/PLAN:    ICD-10-CM    1. Skin infection L08.9 cephalexin (KEFLEX) 500 mg capsule   Infected callus  On exam: well appearing female with 2+ pedal pulse in left foot,  1 cm callused area on dorsal second toe left foot, surrounded by erythema, somewhat warm to touch, tender to touch  Diagnosis: Skin Infection  Treat with Keflex 500 mgs PO TID 10 days  Follow up with wound clinic as scheduled    There are no Patient Instructions on file for this visit.

## 2018-02-12 NOTE — NURSING NOTE
"Patient Information     Patient Name MRN Sex Alicia Mena [id number] Female 1955      Nursing Note by Komal Hernandez at 2017  2:45 PM     Author:  Komal Hernandez Service:  (none) Author Type:  NURS- Student Practical Nurse     Filed:  2017  2:38 PM Encounter Date:  2017 Status:  Signed     :  Komal Hernandez (NURS- Student Practical Nurse)            Patient presents to the clinic for infected second toe on left foot. Has been infected for a while now. Had it drained in [place] on  and now it's \"puss filled, white and painful\", again.   Komal Hernandez LPN............................ 2017 2:07 PM          "

## 2018-03-04 ENCOUNTER — HOSPITAL ENCOUNTER (EMERGENCY)
Facility: OTHER | Age: 63
Discharge: HOME | End: 2018-03-05
Attending: FAMILY MEDICINE | Admitting: FAMILY MEDICINE
Payer: COMMERCIAL

## 2018-03-04 DIAGNOSIS — L03.032 CELLULITIS OF TOE OF LEFT FOOT: ICD-10-CM

## 2018-03-04 PROCEDURE — 99283 EMERGENCY DEPT VISIT LOW MDM: CPT | Mod: Z6 | Performed by: FAMILY MEDICINE

## 2018-03-04 PROCEDURE — 99284 EMERGENCY DEPT VISIT MOD MDM: CPT | Mod: 25 | Performed by: FAMILY MEDICINE

## 2018-03-04 PROCEDURE — 96372 THER/PROPH/DIAG INJ SC/IM: CPT | Performed by: FAMILY MEDICINE

## 2018-03-04 NOTE — ED AVS SNAPSHOT
[facility]    [address]    [place] [address]    Phone:  [phone]    Fax:  [phone]                                       Alicia Becker   MRN: [id number]    Department:  [facility]   Date of Visit:  3/4/2018           After Visit Summary Signature Page     I have received my discharge instructions, and my questions have been answered. I have discussed any challenges I see with this plan with the nurse or doctor.    ..........................................................................................................................................  Patient/Patient Representative Signature      ..........................................................................................................................................  Patient Representative Print Name and Relationship to Patient    ..................................................               ................................................  Date                                            Time    ..........................................................................................................................................  Reviewed by Signature/Title    ...................................................              ..............................................  Date                                                            Time

## 2018-03-04 NOTE — ED AVS SNAPSHOT
[facility]    [address]    [place] [address]    Phone:  [phone]    Fax:  [phone]                                       Alicia Becker   MRN: [id number]    Department:  [facility]   Date of Visit:  3/4/2018           Patient Information     Date Of Birth          1955        Your diagnoses for this visit were:     Cellulitis of toe of left foot        You were seen by Wil Mcclellan MD.        Discharge Instructions       Foot elevation, clinic follow up this week ,  Return here sooner if increasing redness up the foot, fevers or vomiting.    24 Hour Appointment Hotline       To make an appointment at any [facility], call [phone] ([phone]). If you don't have a family doctor or clinic, we will help you find one. [facility] clinics are conveniently located to serve the needs of you and your family.             Review of your medicines      START taking        Dose / Directions Last dose taken    cephALEXin 500 MG capsule   Commonly known as:  KEFLEX   Dose:  500 mg   Quantity:  40 capsule        Take 1 capsule (500 mg) by mouth 4 times daily for 10 days   Refills:  0          Our records show that you are taking the medicines listed below. If these are incorrect, please call your family doctor or clinic.        Dose / Directions Last dose taken    12 HOUR NASAL SPRAY 0.05 % spray   Dose:  1 spray   Generic drug:  oxymetazoline        Spray 1 spray in nostril   Refills:  0        acetaminophen 500 MG tablet   Commonly known as:  TYLENOL        Take 2 tablets by mouth every night at bedtime, also may take 2 tablets by mouth every 6 hours as needed for pain. Max acetaminophen dose: 4000mg in 24 hrs.   Refills:  0        amLODIPine 10 MG tablet   Commonly known as:  NORVASC   Dose:  0.5 tablet        Take 0.5 tablets by mouth daily   Refills:  0        ascorbic acid 500 MG tablet   Commonly known as:  VITAMIN C   Dose:  500  "mg        Take 500 mg by mouth 2 times daily   Refills:  0        aspirin 81 MG chewable tablet   Dose:  81 mg        Take 81 mg by mouth daily With a meal   Refills:  0        B-D TB SYRINGE 25G X 5/8\" 1 ML Misc   Generic drug:  Tuberculin-Allergy Syringes        USE ONE NEW SYRINGE WITH EACH INJECTION EVERY 7 DAYS   Refills:  0        GOOD CONTOUR NEXT test strip   Generic drug:  blood glucose monitoring        USE AS DIRECTED TESTING 3 TO 4 TIMES DAILY   Refills:  0        blood glucose monitoring lancets        USE AS DIRECTED TESTING FOUR TIMES DAILY   Refills:  0        Calcium carb-Vitamin D 500 mg Tribe-200 units 500-200 MG-UNIT per tablet   Commonly known as:  OSCAL with D;Oyster Shell Calcium   Dose:  1 tablet        Take 1 tablet by mouth 2 times daily   Refills:  0        folic acid 1 MG tablet   Commonly known as:  FOLVITE   Dose:  1 mg        Take 1 mg by mouth daily   Refills:  0        ipratropium - albuterol 0.5 mg/2.5 mg/3 mL 0.5-2.5 (3) MG/3ML neb solution   Commonly known as:  DUONEB        Inhale one neb by mouth once daily. When patient has a respiratory illness may increase to 2-3 times daily.   Refills:  0        losartan 100 MG tablet   Commonly known as:  COZAAR   Dose:  100 mg        Take 100 mg by mouth daily   Refills:  0        metFORMIN 500 MG 24 hr tablet   Commonly known as:  GLUCOPHAGE-XR        Refills:  0        Methotrexate (PF) 10 MG/0.2ML Soaj   Dose:  0.8 mL   Indication:  vasculitis        Inject 0.8 mLs Subcutaneous   Refills:  0        montelukast 10 MG tablet   Commonly known as:  SINGULAIR   Dose:  10 mg        Take 10 mg by mouth At Bedtime   Refills:  0        NovoLOG MIX 70/30 FLEXPEN injection   Generic drug:  insulin aspart prot & aspart        Refills:  0        OMEGA-3 FATTY ACIDS-VITAMIN E PO   Dose:  1 capsule        Take 1 capsule by mouth 2 times daily   Refills:  0        omeprazole 40 MG capsule   Commonly known as:  priLOSEC   Dose:  1 capsule        Take 1 " capsule by mouth daily Take before meals. Do not crush.   Refills:  0        OXcarbazepine 600 MG tablet   Commonly known as:  TRILEPTAL   Dose:  2 tablet        Take 2 tablets by mouth 2 times daily   Refills:  0        potassium chloride SA 20 MEQ CR tablet   Commonly known as:  K-DUR/KLOR-CON M   Dose:  1 tablet        Take 1 tablet by mouth 2 times daily (with meals) Do not crush.   Refills:  0        * predniSONE 1 MG tablet   Commonly known as:  DELTASONE        Take 3-4 capsules by mouth once daily with 5 mg tablet to equate either 8 mg or 9 mg once daily alternating every other day. Continue with taper as directed.   Refills:  0        * predniSONE 5 MG tablet   Commonly known as:  DELTASONE        Take 1 capsules by mouth once daily with 1 mg tablets to equate either 8 mg or 9 mg once daily alternating every other day. Continue with taper as directed.   Refills:  0        senna-docusate 8.6-50 MG per tablet   Commonly known as:  SENOKOT-S;PERICOLACE   Dose:  1 tablet        Take 1 tablet by mouth 2 times daily as needed for constipation   Refills:  0        simvastatin 20 MG tablet   Commonly known as:  ZOCOR   Dose:  1 tablet        Take 1 tablet by mouth At Bedtime   Refills:  0        Sodium Chloride 0.65 % Soln   Dose:  1 spray        Spray 1 spray in nostril nightly as needed For nasal dryness   Refills:  0        sodium chloride 1 GM tablet   Dose:  4 tablet        Take 4 tablets by mouth 3 times daily (with meals)   Refills:  0        VENTOLIN  (90 BASE) MCG/ACT Inhaler   Dose:  1-2 puff   Generic drug:  albuterol        Inhale 1-2 puffs into the lungs every 4 hours as needed for shortness of breath / dyspnea Shake before using.   Refills:  0        VIMPAT 200 MG Tabs tablet   Dose:  200 mg   Generic drug:  lacosamide        Take 200 mg by mouth 2 times daily   Refills:  0        vitamin E 400 UNIT capsule   Dose:  400 Units   Generic drug:  vitamin E        Take 400 Units by mouth 2 times  "daily   Refills:  0        * Notice:  This list has 2 medication(s) that are the same as other medications prescribed for you. Read the directions carefully, and ask your doctor or other care provider to review them with you.            Prescriptions were sent or printed at these locations (1 Prescription)                   [facility] - [place], [place] - [address]   [address], [place] [address]    Telephone:  [phone]   Fax:  [phone]   Hours:                  E-Prescribed (1 of 1)         cephALEXin (KEFLEX) 500 MG capsule                Orders Needing Specimen Collection     None      Pending Results     No orders found for last 3 day(s).            Pending Culture Results     No orders found for last 3 day(s).            Thank you for choosing [facility]       Thank you for choosing [facility] for your care. Our goal is always to provide you with excellent care. Hearing back from our patients is one way we can continue to improve our services. Please take a few minutes to complete the written survey that you may receive in the mail after you visit with us. Thank you!        [employer] Information     [employer] lets you send messages to your doctor, view your test results, renew your prescriptions, schedule appointments and more. To sign up, go to [email or web address] . Click on \"Log in\" on the left side of the screen, which will take you to the Welcome page. Then click on \"Sign up Now\" on the right side of the page.     You will be asked to enter the access code listed below, as well as some personal information. Please follow the directions to create your username and password.     Your access code is: [email or web address]  Expires: 6/3/2018 12:39 AM     Your access code will  in 90 days. If you need help or a new code, please call your [facility] clinic or [phone].        Care EveryWhere ID     This is your Care EveryWhere ID. This could be used by other " organizations to access your [facility] medical records  [id number]        Equal Access to Services     ANKUR FRY : Jhon Mckeon, benedicto nogueira, emma loya. So [facility] [phone].    ATENCIÓN: Si habla español, tiene a morataya disposición servicios gratuitos de asistencia lingüística. Llame al [phone].    We comply with applicable federal civil rights laws and [place] laws. We do not discriminate on the basis of race, color, national origin, age, disability, sex, sexual orientation, or gender identity.            After Visit Summary       This is your record. Keep this with you and show to your community pharmacist(s) and doctor(s) at your next visit.

## 2018-03-05 VITALS
OXYGEN SATURATION: 93 % | RESPIRATION RATE: 15 BRPM | SYSTOLIC BLOOD PRESSURE: 170 MMHG | HEIGHT: 63 IN | BODY MASS INDEX: 31.89 KG/M2 | TEMPERATURE: 98 F | DIASTOLIC BLOOD PRESSURE: 90 MMHG | WEIGHT: 180 LBS

## 2018-03-05 PROCEDURE — 25000128 H RX IP 250 OP 636: Performed by: FAMILY MEDICINE

## 2018-03-05 PROCEDURE — 25000132 ZZH RX MED GY IP 250 OP 250 PS 637: Performed by: FAMILY MEDICINE

## 2018-03-05 PROCEDURE — 25000125 ZZHC RX 250: Performed by: FAMILY MEDICINE

## 2018-03-05 RX ORDER — CEPHALEXIN 500 MG/1
500 CAPSULE ORAL 4 TIMES DAILY
Qty: 40 CAPSULE | Refills: 0 | Status: SHIPPED | OUTPATIENT
Start: 2018-03-05 | End: 2018-03-15

## 2018-03-05 RX ORDER — ACETAMINOPHEN 500 MG
1000 TABLET ORAL ONCE
Status: COMPLETED | OUTPATIENT
Start: 2018-03-05 | End: 2018-03-05

## 2018-03-05 RX ORDER — CEFTRIAXONE SODIUM 1 G
1 VIAL (EA) INJECTION ONCE
Status: COMPLETED | OUTPATIENT
Start: 2018-03-05 | End: 2018-03-05

## 2018-03-05 RX ORDER — TRAMADOL HYDROCHLORIDE 50 MG/1
100 TABLET ORAL ONCE
Status: COMPLETED | OUTPATIENT
Start: 2018-03-05 | End: 2018-03-05

## 2018-03-05 RX ADMIN — LIDOCAINE HYDROCHLORIDE 1 G: 10 INJECTION, SOLUTION INFILTRATION; PERINEURAL at 00:16

## 2018-03-05 RX ADMIN — ACETAMINOPHEN 1000 MG: 500 TABLET, FILM COATED ORAL at 00:15

## 2018-03-05 RX ADMIN — TRAMADOL HYDROCHLORIDE 100 MG: 50 TABLET, FILM COATED ORAL at 00:15

## 2018-03-05 ASSESSMENT — ENCOUNTER SYMPTOMS
VOMITING: 0
HEADACHES: 0
WOUND: 0
NAUSEA: 0
AGITATION: 0
BRUISES/BLEEDS EASILY: 0
DIZZINESS: 0
LIGHT-HEADEDNESS: 0
FEVER: 0
DIAPHORESIS: 0
CHILLS: 0
CONFUSION: 0

## 2018-03-05 NOTE — DISCHARGE INSTRUCTIONS
Foot elevation, clinic follow up this week ,  Return here sooner if increasing redness up the foot, fevers or vomiting.

## 2018-03-05 NOTE — ED NOTES
"ED Nursing Triage Note (General)   ________________________________    Alicia Becker is a 62 year old Female that presents to triage private car  With history of  Diabetes and toe infection in second toe left foot that she has been treated for but has returned reported by patient  Significant symptoms had onset 4 hour(s) ago.  /90  Temp 98  F (36.7  C) (Tympanic)  Resp 15  Ht 1.6 m (5' 3\")  Wt 81.6 kg (180 lb)  SpO2 93%  Breastfeeding? No  BMI 31.89 kg/m2t  Patient appears alert , in mild distress., and cooperative behavior.  No Behavioral concerns  Airway: intact  Breathing noted as Normal.  Circulation {NORMAL/ABNORMAL DEFAULT NORMAL:[id number]  Skinnormal  Action taken: to room       PRE HOSPITAL PRIOR LIVING SITUATION Spouse  "

## 2018-03-05 NOTE — ED PROVIDER NOTES
History     Chief Complaint   Patient presents with     toe infection     HPI  Alicia Becker is a 62 year old female who is in with pain left  2nd toe dorsally  With big toe crossed over it.  Last November an December had   A dorsal lump or scar drained with bloody fluid and cleared with  Keflex.  She has a brace  For toes   But unable to wear it  For awhile pending shoe adjustments?.  No fever,        Problem List:    Patient Active Problem List    Diagnosis Date Noted     Closed fracture of distal end of left radius with routine healing 12/27/2016     Priority: Medium     Cerebral hyponatremia 12/25/2016     Priority: Medium     Immunocompromised patient (H) 12/25/2016     Priority: Medium     Long term current use of systemic steroids 12/25/2016     Priority: Medium     Seizure disorder (H) 12/25/2016     Priority: Medium     Weakness of left side of body 12/23/2016     Priority: Medium     Closed Colles' fracture of left radius 12/21/2016     Priority: Medium     Community acquired pneumonia 06/07/2016     Priority: Medium     Fibrosis of lung (H) 06/07/2016     Priority: Medium     Hypoxia 06/07/2016     Priority: Medium     Ischemic stroke (H) 02/11/2016     Priority: Medium     Polyneuropathy in diseases classified elsewhere (H) 02/11/2016     Priority: Medium     Abnormal chest sounds 01/29/2015     Priority: Medium     Overview:   Overview:   Rhonchi heard on exam when patient is asymptomatic and has normal CXR.       Other long term (current) drug therapy 09/26/2014     Priority: Medium     Overview:   Overview:   MTX       CD (conductive deafness) 05/02/2013     Priority: Medium     Overview:   Overview:   Mild; R> L       Bronchiectasis (H) 01/01/2013     Priority: Medium     Lesion of brain 11/08/2012     Priority: Medium     Lymphocytic thyroiditis 08/03/2012     Priority: Medium     Type 2 diabetes mellitus with other diabetic neurological complication 07/24/2012     Priority: Medium     Overview:    Overview:   Dx 1996  LDL at goal.   BP at goal.   On losartan.   On ASA.   microalbumin 2015  A1C 6.9 2015  Eye Exam 10/2014: no retinopathy.  She does have cataracts       Necrotizing vasculitis (H) 05/23/2012     Priority: Medium     Overview:   Overview:   5/2012: Bx L and R foot: necrotizing vasculitis / MPO +; , ESPERANZA : negative  CTX June 2012- 11/2012, AZA 12/2012-1/2013; Johnson eval Brain Bx; MTX 5/2016  Monthly CBC, AST, Cr; periodic UA  5/28/2015 Bx Nodular necrotizing vasculitis / Panniculitis, resembling EN       Disease of lung 05/01/2012     Priority: Medium     Overview:   Overview:   Needs CT without contrast Dec. 2015 - follow up pulm nodules - if stable no further chest images necessary.       Generalized seizure (H) 03/13/2012     Priority: Medium     Overview:   Overview:   Needs neurology follow up in March - July 2015 with new neurologist.       Essential (primary) hypertension 05/30/2008     Priority: Medium     Overview:   Overview:   IMO Update       Hypercholesterolemia 05/30/2008     Priority: Medium     Sleep apnea 05/30/2008     Priority: Medium        Past Medical History:    History reviewed. No pertinent past medical history.    Past Surgical History:    History reviewed. No pertinent surgical history.    Family History:    No family history on file.    Social History:  Marital Status:   [2]  Social History   Substance Use Topics     Smoking status: Former Smoker     Smokeless tobacco: Never Used     Alcohol use No        Medications:      cephALEXin (KEFLEX) 500 MG capsule   cephALEXin (KEFLEX) 500 MG capsule   Methotrexate, PF, 10 MG/0.2ML SOAJ   acetaminophen (TYLENOL) 500 MG tablet   amLODIPine (NORVASC) 10 MG tablet   ascorbic acid (VITAMIN C) 500 MG tablet   aspirin 81 MG chewable tablet   Calcium carb-Vitamin D 500 mg [place]-200 units (OSCAL WITH D;OYSTER SHELL CALCIUM) 500-200 MG-UNIT per tablet   folic acid (FOLVITE) 1 MG tablet   losartan (COZAAR) 100 MG tablet  "  metFORMIN (GLUCOPHAGE-XR) 500 MG 24 hr tablet   montelukast (SINGULAIR) 10 MG tablet   OMEGA-3 FATTY ACIDS-VITAMIN E PO   OXcarbazepine (TRILEPTAL) 600 MG tablet   oxymetazoline (12 HOUR NASAL SPRAY) 0.05 % spray   potassium chloride SA (K-DUR/KLOR-CON M) 20 MEQ CR tablet   predniSONE (DELTASONE) 1 MG tablet   predniSONE (DELTASONE) 5 MG tablet   simvastatin (ZOCOR) 20 MG tablet   Saline (SODIUM CHLORIDE) 0.65 % SOLN   sodium chloride 1 GM tablet   lacosamide (VIMPAT) 200 MG TABS tablet   vitamin E (VITAMIN E) 400 UNIT capsule   ipratropium - albuterol 0.5 mg/2.5 mg/3 mL (DUONEB) 0.5-2.5 (3) MG/3ML neb solution   Tuberculin-Allergy Syringes (B-D TB SYRINGE) 25G X 5/8\" 1 ML MISC   blood glucose monitoring (GOOD CONTOUR NEXT) test strip   blood glucose monitoring (GOOD MICROLET) lancets   insulin aspart prot & aspart (NOVOLOG MIX 70/30 FLEXPEN) injection   omeprazole (PRILOSEC) 40 MG capsule   senna-docusate (SENOKOT-S;PERICOLACE) 8.6-50 MG per tablet   albuterol (VENTOLIN HFA) 108 (90 BASE) MCG/ACT Inhaler         Review of Systems   Constitutional: Negative for chills, diaphoresis and fever.   Gastrointestinal: Negative for nausea and vomiting.   Musculoskeletal:        No leg pain   Skin: Negative for rash and wound.   Allergic/Immunologic: Negative for immunocompromised state.   Neurological: Negative for dizziness, light-headedness and headaches.   Hematological: Does not bruise/bleed easily.   Psychiatric/Behavioral: Negative for agitation, behavioral problems and confusion.       Physical Exam   BP: 170/90  Heart Rate: 95  Temp: 98  F (36.7  C)  Resp: 15  Height: 160 cm (5' 3\")  Weight: 81.6 kg (180 lb)  SpO2: 93 %      Physical Exam   Constitutional: She is oriented to person, place, and time. She appears well-developed and well-nourished. No distress.   She feels better standing it keeps her toes spread apart.   HENT:   Head: Normocephalic and atraumatic.   Eyes: Pupils are equal, round, and reactive to " light.   Neck: Normal range of motion. Neck supple.   Cardiovascular: Regular rhythm.    Rate 95   Pulmonary/Chest: Breath sounds normal.   Rate 15   Musculoskeletal:   Redness and sin thickening dorsal  Left  2 nd toe, no fluctuance and no hard callous - soft skin but built up,  Tender.   Neurological: She is alert and oriented to person, place, and time.   Skin: Skin is warm and dry. She is not diaphoretic.   Psychiatric: She has a normal mood and affect. Her behavior is normal. Judgment and thought content normal.   Nursing note and vitals reviewed.      ED Course     ED Course     Procedures    Results for orders placed or performed during the hospital encounter of 11/09/17   XR Chest w Fluoro 2 Views    Narrative    Procedure: XR CHEST FLUORO    HISTORY:  Dyspnea on exertion. Elevated right hemidiaphragm, concern for diaphragm paralysis    TECHNIQUE: The patient's breathing was observed under fluoroscopy. 14 seconds fluoroscopy time was used for the study.    FINDINGS:    The right hemidiaphragm is elevated. With inhalation, there is paradoxical upward motion of the right hemidiaphragm.      Impression    Right diaphragmatic paralysis.    Electronically Signed By: Leidy Grant M.D. on 11/9/2017 2:28 PM                  Labs Ordered and Resulted from Time of ED Arrival Up to the Time of Departure from the ED - No data to display    Assessments & Plan (with Medical Decision Making)     I have reviewed the nursing notes.    I have reviewed the findings, diagnosis, plan and need for follow up with the patient.      Discharge Medication List as of 3/5/2018 12:39 AM      START taking these medications    Details   cephALEXin (KEFLEX) 500 MG capsule Take 1 capsule (500 mg) by mouth 4 times daily for 10 days, Disp-40 capsule, R-0, E-Prescribe             Final diagnoses:   Cellulitis of toe of left foot   clinic follow up this week - sooner if fevers, vomiting, redness going up the foot or not tolerating  medication    3/4/2018   [facility]     Wil Mcclellan MD  03/05/18 [id number]

## 2018-04-10 ENCOUNTER — OFFICE VISIT (OUTPATIENT)
Dept: OTOLARYNGOLOGY | Facility: OTHER | Age: 63
End: 2018-04-10
Attending: OTOLARYNGOLOGY
Payer: COMMERCIAL

## 2018-04-10 DIAGNOSIS — H72.91 PERFORATION OF TYMPANIC MEMBRANE, RIGHT: Primary | ICD-10-CM

## 2018-04-10 PROCEDURE — G0463 HOSPITAL OUTPT CLINIC VISIT: HCPCS

## 2018-04-10 NOTE — PROGRESS NOTES
LETTYLOGAN    62 Y old Female, : 1955    Account Number: [id number]    [id number] [facility]    Home: [phone]     Guarantor: LOGAN SAENZ Insurance: [facility] Payer ID:       Appointment Facility: [facility]      04/10/2018 Klever Maria MD       Current Medications Reason for Appointment     1. RIGHT EAR INFECTION/CONDUCTIVE HEARING LOSS     2. Recent ear infection     History of Present Illness     HPI:   Patient is a 62-year-old female with whom I am quite familiar. She has had chronic eustachian tube dysfunction with recurring fluid. She has recently been treated for an ear infection on the right. She has a known perforation of this ear. She denied any active drainage.     Examination     General Examination:  External auditory canal on the left was cleared of cerumen and debris. The eardrum is intact and somewhat thickened. On the right canal is clear. There is a central perforation which is quite small. There is no active drainage.   The remainder of the head neck exam is unremarkable.       Assessments     1. Perforation of right tympanic membrane - H72.91 (Primary)     Treatment     1. Others   Notes: I have asked that she see me when she is suspicious of infection in the future. She was told that she would on likely have an infection in the right ear without active purulent drainage.  Procedures  [ ].                Follow Up     prn         None          Past Medical History     Depression .       Diabetes.       Ear pressure.       Hearing loss.       HTN.       Stroke.       Surgical History Electronically signed by KLEVER MARIA MD on 2018 at 03:51 PM CDT    Biopsies-top of head      Social History Sign off status: Completed    Tobacco Use:   Smoking   History: former smoker     Allergies     N.K.D.A.     Review of Systems     [facility]  [address]  [place] [address]  Tel: [phone]  Fax:       [ ].            Patient: LOGAN SAENZ : 1955 Progress Note: Klever Maria MD 04/10/2018        Note generated by [employer] EMR/PM Software [email or web address]    true

## 2018-04-10 NOTE — MR AVS SNAPSHOT
"              After Visit Summary   4/10/2018    Alicia Becker    MRN: [id number]           Patient Information     Date Of Birth          1955        Visit Information        Provider Department      4/10/2018 1:10 PM Klever Maria MD [facility]        Today's Diagnoses     Perforation of tympanic membrane, right    -  1       Follow-ups after your visit        Who to contact     If you have questions or need follow up information about today's clinic visit or your schedule please contact [facility] directly at [phone].  Normal or non-critical lab and imaging results will be communicated to you by Elecarhart, letter or phone within 4 business days after the clinic has received the results. If you do not hear from us within 7 days, please contact the clinic through [employer] or phone. If you have a critical or abnormal lab result, we will notify you by phone as soon as possible.  Submit refill requests through PocketSuite or call your pharmacy and they will forward the refill request to us. Please allow 3 business days for your refill to be completed.          Additional Information About Your Visit        MyChart Information     PocketSuite lets you send messages to your doctor, view your test results, renew your prescriptions, schedule appointments and more. To sign up, go to [email or web address] . Click on \"Log in\" on the left side of the screen, which will take you to the Welcome page. Then click on \"Sign up Now\" on the right side of the page.     You will be asked to enter the access code listed below, as well as some personal information. Please follow the directions to create your username and password.     Your access code is: [email or web address]  Expires: 6/3/2018  1:39 AM     Your access code will  in 90 days. If you need help or a new code, please call your [place] clinic or [phone].        Care EveryWhere ID     This is your Care EveryWhere ID. This " could be used by other organizations to access your [place] medical records  [id number]         Blood Pressure from Last 3 Encounters:   03/04/18 170/90   12/13/17 128/82   03/28/17 118/76    Weight from Last 3 Encounters:   03/04/18 81.6 kg (180 lb)   12/13/17 82.2 kg (181 lb 3.2 oz)   03/28/17 81.6 kg (180 lb)              Today, you had the following     No orders found for display       Primary Care Provider Office Phone # Fax #    Oriana Casarez [phone]       [facility] [address] [facility] [address]        Equal Access to Services     [facility] : Jhon Mckeon, benedicto nogueira, margaux kaalmada juan, emma saba. So [facility] [phone].    ATENCIÓN: Si habla español, tiene a morataya disposición servicios gratuitos de asistencia lingüística. Llame al [phone].    We comply with applicable federal civil rights laws and [place] laws. We do not discriminate on the basis of race, color, national origin, age, disability, sex, sexual orientation, or gender identity.            Thank you!     Thank you for choosing [facility] AND [facility]  for your care. Our goal is always to provide you with excellent care. Hearing back from our patients is one way we can continue to improve our services. Please take a few minutes to complete the written survey that you may receive in the mail after your visit with us. Thank you!             Your Updated Medication List - Protect others around you: Learn how to safely use, store and throw away your medicines at www.disposemymeds.org.          This list is accurate as of 4/10/18 11:59 PM.  Always use your most recent med list.                   Brand Name Dispense Instructions for use Diagnosis    12 HOUR NASAL SPRAY 0.05 % spray   Generic drug:  oxymetazoline      Spray 1 spray in nostril        acetaminophen 500 MG tablet    TYLENOL     Take 2 tablets by mouth every night at bedtime, also  "may take 2 tablets by mouth every 6 hours as needed for pain. Max acetaminophen dose: 4000mg in 24 hrs.        amLODIPine 10 MG tablet    NORVASC     Take 0.5 tablets by mouth daily        ascorbic acid 500 MG tablet    VITAMIN C     Take 500 mg by mouth 2 times daily        aspirin 81 MG chewable tablet      Take 81 mg by mouth daily With a meal        B-D TB SYRINGE 25G X 5/8\" 1 ML Misc   Generic drug:  Tuberculin-Allergy Syringes      USE ONE NEW SYRINGE WITH EACH INJECTION EVERY 7 DAYS        GOOD CONTOUR NEXT test strip   Generic drug:  blood glucose monitoring      USE AS DIRECTED TESTING 3 TO 4 TIMES DAILY        blood glucose monitoring lancets      USE AS DIRECTED TESTING FOUR TIMES DAILY        Calcium carb-Vitamin D 500 mg [place]-200 units 500-200 MG-UNIT per tablet    OSCAL with D;Oyster Shell Calcium     Take 1 tablet by mouth 2 times daily        folic acid 1 MG tablet    FOLVITE     Take 1 mg by mouth daily        ipratropium - albuterol 0.5 mg/2.5 mg/3 mL 0.5-2.5 (3) MG/3ML neb solution    DUONEB     Inhale one neb by mouth once daily. When patient has a respiratory illness may increase to 2-3 times daily.        losartan 100 MG tablet    COZAAR     Take 100 mg by mouth daily        metFORMIN 500 MG 24 hr tablet    GLUCOPHAGE-XR          Methotrexate (PF) 10 MG/0.2ML Soaj      Inject 0.8 mLs Subcutaneous        montelukast 10 MG tablet    SINGULAIR     Take 10 mg by mouth At Bedtime        NovoLOG MIX 70/30 FLEXPEN injection   Generic drug:  insulin aspart prot & aspart           OMEGA-3 FATTY ACIDS-VITAMIN E PO      Take 1 capsule by mouth 2 times daily        omeprazole 40 MG capsule    priLOSEC     Take 1 capsule by mouth daily Take before meals. Do not crush.        OXcarbazepine 600 MG tablet    TRILEPTAL     Take 2 tablets by mouth 2 times daily        potassium chloride SA 20 MEQ CR tablet    K-DUR/KLOR-CON M     Take 1 tablet by mouth 2 times daily (with meals) Do not crush.        * " predniSONE 1 MG tablet    DELTASONE     Take 3-4 capsules by mouth once daily with 5 mg tablet to equate either 8 mg or 9 mg once daily alternating every other day. Continue with taper as directed.        * predniSONE 5 MG tablet    DELTASONE     Take 1 capsules by mouth once daily with 1 mg tablets to equate either 8 mg or 9 mg once daily alternating every other day. Continue with taper as directed.        senna-docusate 8.6-50 MG per tablet    SENOKOT-S;PERICOLACE     Take 1 tablet by mouth 2 times daily as needed for constipation        simvastatin 20 MG tablet    ZOCOR     Take 1 tablet by mouth At Bedtime        Sodium Chloride 0.65 % Soln      Spray 1 spray in nostril nightly as needed For nasal dryness        sodium chloride 1 GM tablet      Take 4 tablets by mouth 3 times daily (with meals)        VENTOLIN  (90 Base) MCG/ACT Inhaler   Generic drug:  albuterol      Inhale 1-2 puffs into the lungs every 4 hours as needed for shortness of breath / dyspnea Shake before using.        VIMPAT 200 MG Tabs tablet   Generic drug:  lacosamide      Take 200 mg by mouth 2 times daily        vitamin E 400 UNIT capsule   Generic drug:  vitamin E      Take 400 Units by mouth 2 times daily        * Notice:  This list has 2 medication(s) that are the same as other medications prescribed for you. Read the directions carefully, and ask your doctor or other care provider to review them with you.

## 2019-01-22 ENCOUNTER — HOSPITAL ENCOUNTER (INPATIENT)
Facility: OTHER | Age: 64
LOS: 4 days | Discharge: HOME | End: 2019-01-27
Attending: STUDENT IN AN ORGANIZED HEALTH CARE EDUCATION/TRAINING PROGRAM | Admitting: FAMILY MEDICINE
Payer: COMMERCIAL

## 2019-01-22 ENCOUNTER — APPOINTMENT (OUTPATIENT)
Dept: GENERAL RADIOLOGY | Facility: OTHER | Age: 64
End: 2019-01-22
Payer: COMMERCIAL

## 2019-01-22 DIAGNOSIS — J15.9 COMMUNITY ACQUIRED BACTERIAL PNEUMONIA: ICD-10-CM

## 2019-01-22 DIAGNOSIS — R09.02 HYPOXEMIA: ICD-10-CM

## 2019-01-22 DIAGNOSIS — I10 ESSENTIAL (PRIMARY) HYPERTENSION: ICD-10-CM

## 2019-01-22 DIAGNOSIS — I27.20 PULMONARY HYPERTENSION (H): Primary | ICD-10-CM

## 2019-01-22 DIAGNOSIS — Z87.891 PERSONAL HISTORY OF TOBACCO USE, PRESENTING HAZARDS TO HEALTH: ICD-10-CM

## 2019-01-22 DIAGNOSIS — Z79.52 LONG TERM CURRENT USE OF SYSTEMIC STEROIDS: ICD-10-CM

## 2019-01-22 DIAGNOSIS — G47.33 OBSTRUCTIVE SLEEP APNEA (ADULT) (PEDIATRIC): ICD-10-CM

## 2019-01-22 DIAGNOSIS — Z79.52 CURRENT CHRONIC USE OF SYSTEMIC STEROIDS: ICD-10-CM

## 2019-01-22 DIAGNOSIS — D84.9 IMMUNOCOMPROMISED PATIENT (H): ICD-10-CM

## 2019-01-22 DIAGNOSIS — Z86.79 PERSONAL HISTORY OF CARDIOVASCULAR DISORDER: ICD-10-CM

## 2019-01-22 DIAGNOSIS — I10 ESSENTIAL HYPERTENSION, BENIGN: ICD-10-CM

## 2019-01-22 DIAGNOSIS — G47.33 OSA (OBSTRUCTIVE SLEEP APNEA): ICD-10-CM

## 2019-01-22 DIAGNOSIS — G40.909 NONINTRACTABLE EPILEPSY WITHOUT STATUS EPILEPTICUS, UNSPECIFIED EPILEPSY TYPE (H): ICD-10-CM

## 2019-01-22 LAB
6 MIN WALK (FT): NORMAL FT
6 MIN WALK (M): NORMAL M
ALBUMIN SERPL-MCNC: 3.8 G/DL (ref 3.5–5.7)
ALP SERPL-CCNC: 48 U/L (ref 34–104)
ALT SERPL W P-5'-P-CCNC: 12 U/L (ref 7–52)
ANION GAP SERPL CALCULATED.3IONS-SCNC: 10 MMOL/L (ref 3–14)
AST SERPL W P-5'-P-CCNC: 12 U/L (ref 13–39)
BASOPHILS # BLD AUTO: 0 10E9/L (ref 0–0.2)
BASOPHILS NFR BLD AUTO: 0.3 %
BILIRUB SERPL-MCNC: 0.4 MG/DL (ref 0.3–1)
BUN SERPL-MCNC: 10 MG/DL (ref 7–25)
CALCIUM SERPL-MCNC: 9.3 MG/DL (ref 8.6–10.3)
CHLORIDE SERPL-SCNC: 101 MMOL/L (ref 98–107)
CO2 SERPL-SCNC: 25 MMOL/L (ref 21–31)
CREAT SERPL-MCNC: 0.44 MG/DL (ref 0.6–1.2)
D DIMER PPP DDU-MCNC: 327 NG/ML D-DU (ref 0–230)
DIFFERENTIAL METHOD BLD: ABNORMAL
EOSINOPHIL # BLD AUTO: 0 10E9/L (ref 0–0.7)
EOSINOPHIL NFR BLD AUTO: 0.1 %
ERYTHROCYTE [DISTWIDTH] IN BLOOD BY AUTOMATED COUNT: 14.7 % (ref 10–15)
GFR SERPL CREATININE-BSD FRML MDRD: >90 ML/MIN/{1.73_M2}
GLUCOSE SERPL-MCNC: 182 MG/DL (ref 70–105)
HCO3 BLD-SCNC: 26 MMOL/L (ref 22–28)
HCT VFR BLD AUTO: 35.5 % (ref 35–47)
HGB BLD-MCNC: 11.8 G/DL (ref 11.7–15.7)
IMM GRANULOCYTES # BLD: 0 10E9/L (ref 0–0.4)
IMM GRANULOCYTES NFR BLD: 0.3 %
LACTATE SERPL-SCNC: 1.9 MMOL/L (ref 0.5–2.2)
LYMPHOCYTES # BLD AUTO: 0.9 10E9/L (ref 0.8–5.3)
LYMPHOCYTES NFR BLD AUTO: 6.5 %
MCH RBC QN AUTO: 33.8 PG (ref 26.5–33)
MCHC RBC AUTO-ENTMCNC: 33.2 G/DL (ref 31.5–36.5)
MCV RBC AUTO: 102 FL (ref 78–100)
MONOCYTES # BLD AUTO: 0.9 10E9/L (ref 0–1.3)
MONOCYTES NFR BLD AUTO: 6.8 %
NEUTROPHILS # BLD AUTO: 11.2 10E9/L (ref 1.6–8.3)
NEUTROPHILS NFR BLD AUTO: 86 %
NT-PROBNP SERPL-MCNC: 61 PG/ML (ref 0–100)
O2/TOTAL GAS SETTING VFR VENT: 0 %
OXYHGB MFR BLD: 93 %
PCO2 BLD: 46 MM HG (ref 35–45)
PH BLD: 7.37 PH (ref 7.35–7.45)
PLATELET # BLD AUTO: 188 10E9/L (ref 150–450)
PO2 BLD: 78 MM HG (ref 83–108)
POTASSIUM SERPL-SCNC: 4 MMOL/L (ref 3.5–5.1)
PROT SERPL-MCNC: 7 G/DL (ref 6.4–8.9)
RBC # BLD AUTO: 3.49 10E12/L (ref 3.8–5.2)
SODIUM SERPL-SCNC: 136 MMOL/L (ref 134–144)
TROPONIN I SERPL-MCNC: <0.03 UG/L (ref 0–0.03)
WBC # BLD AUTO: 13.1 10E9/L (ref 4–11)

## 2019-01-22 PROCEDURE — 83880 ASSAY OF NATRIURETIC PEPTIDE: CPT | Performed by: STUDENT IN AN ORGANIZED HEALTH CARE EDUCATION/TRAINING PROGRAM

## 2019-01-22 PROCEDURE — 85379 FIBRIN DEGRADATION QUANT: CPT | Performed by: STUDENT IN AN ORGANIZED HEALTH CARE EDUCATION/TRAINING PROGRAM

## 2019-01-22 PROCEDURE — 96360 HYDRATION IV INFUSION INIT: CPT | Performed by: STUDENT IN AN ORGANIZED HEALTH CARE EDUCATION/TRAINING PROGRAM

## 2019-01-22 PROCEDURE — 87040 BLOOD CULTURE FOR BACTERIA: CPT | Performed by: STUDENT IN AN ORGANIZED HEALTH CARE EDUCATION/TRAINING PROGRAM

## 2019-01-22 PROCEDURE — 85025 COMPLETE CBC W/AUTO DIFF WBC: CPT | Performed by: STUDENT IN AN ORGANIZED HEALTH CARE EDUCATION/TRAINING PROGRAM

## 2019-01-22 PROCEDURE — 84484 ASSAY OF TROPONIN QUANT: CPT | Performed by: STUDENT IN AN ORGANIZED HEALTH CARE EDUCATION/TRAINING PROGRAM

## 2019-01-22 PROCEDURE — 36600 WITHDRAWAL OF ARTERIAL BLOOD: CPT | Performed by: STUDENT IN AN ORGANIZED HEALTH CARE EDUCATION/TRAINING PROGRAM

## 2019-01-22 PROCEDURE — 99285 EMERGENCY DEPT VISIT HI MDM: CPT | Mod: 25 | Performed by: STUDENT IN AN ORGANIZED HEALTH CARE EDUCATION/TRAINING PROGRAM

## 2019-01-22 PROCEDURE — 83036 HEMOGLOBIN GLYCOSYLATED A1C: CPT | Performed by: FAMILY MEDICINE

## 2019-01-22 PROCEDURE — 25000132 ZZH RX MED GY IP 250 OP 250 PS 637: Mod: GY | Performed by: STUDENT IN AN ORGANIZED HEALTH CARE EDUCATION/TRAINING PROGRAM

## 2019-01-22 PROCEDURE — 82805 BLOOD GASES W/O2 SATURATION: CPT | Performed by: STUDENT IN AN ORGANIZED HEALTH CARE EDUCATION/TRAINING PROGRAM

## 2019-01-22 PROCEDURE — 99285 EMERGENCY DEPT VISIT HI MDM: CPT | Mod: Z6 | Performed by: STUDENT IN AN ORGANIZED HEALTH CARE EDUCATION/TRAINING PROGRAM

## 2019-01-22 PROCEDURE — 71046 X-RAY EXAM CHEST 2 VIEWS: CPT

## 2019-01-22 PROCEDURE — 80053 COMPREHEN METABOLIC PANEL: CPT | Performed by: STUDENT IN AN ORGANIZED HEALTH CARE EDUCATION/TRAINING PROGRAM

## 2019-01-22 PROCEDURE — 36415 COLL VENOUS BLD VENIPUNCTURE: CPT | Performed by: STUDENT IN AN ORGANIZED HEALTH CARE EDUCATION/TRAINING PROGRAM

## 2019-01-22 PROCEDURE — A9270 NON-COVERED ITEM OR SERVICE: HCPCS | Mod: GY | Performed by: STUDENT IN AN ORGANIZED HEALTH CARE EDUCATION/TRAINING PROGRAM

## 2019-01-22 PROCEDURE — 83605 ASSAY OF LACTIC ACID: CPT | Performed by: STUDENT IN AN ORGANIZED HEALTH CARE EDUCATION/TRAINING PROGRAM

## 2019-01-22 PROCEDURE — 93005 ELECTROCARDIOGRAM TRACING: CPT | Performed by: STUDENT IN AN ORGANIZED HEALTH CARE EDUCATION/TRAINING PROGRAM

## 2019-01-22 PROCEDURE — 25000128 H RX IP 250 OP 636: Performed by: STUDENT IN AN ORGANIZED HEALTH CARE EDUCATION/TRAINING PROGRAM

## 2019-01-22 PROCEDURE — 87040 BLOOD CULTURE FOR BACTERIA: CPT | Mod: 91 | Performed by: STUDENT IN AN ORGANIZED HEALTH CARE EDUCATION/TRAINING PROGRAM

## 2019-01-22 PROCEDURE — 93010 ELECTROCARDIOGRAM REPORT: CPT | Performed by: INTERNAL MEDICINE

## 2019-01-22 RX ORDER — OXCARBAZEPINE 300 MG/1
600 TABLET, FILM COATED ORAL ONCE
Status: COMPLETED | OUTPATIENT
Start: 2019-01-22 | End: 2019-01-22

## 2019-01-22 RX ADMIN — SODIUM CHLORIDE 500 ML: 900 INJECTION, SOLUTION INTRAVENOUS at 22:34

## 2019-01-22 RX ADMIN — OXCARBAZEPINE 600 MG: 300 TABLET ORAL at 22:48

## 2019-01-22 ASSESSMENT — ENCOUNTER SYMPTOMS
VOMITING: 0
NAUSEA: 0
FEVER: 1
RHINORRHEA: 0
WEAKNESS: 0
AGITATION: 0
WHEEZING: 0
CHEST TIGHTNESS: 1
SHORTNESS OF BREATH: 1
COUGH: 1
ABDOMINAL PAIN: 0
DYSURIA: 0
DIZZINESS: 0
LIGHT-HEADEDNESS: 0

## 2019-01-23 ENCOUNTER — APPOINTMENT (OUTPATIENT)
Dept: CT IMAGING | Facility: OTHER | Age: 64
End: 2019-01-23
Payer: COMMERCIAL

## 2019-01-23 PROBLEM — J15.9 COMMUNITY ACQUIRED BACTERIAL PNEUMONIA: Status: ACTIVE | Noted: 2019-01-23

## 2019-01-23 PROBLEM — J18.9 COMMUNITY ACQUIRED PNEUMONIA, UNSPECIFIED LATERALITY: Status: ACTIVE | Noted: 2019-01-23

## 2019-01-23 LAB — HBA1C MFR BLD: 7.4 % (ref 4–6)

## 2019-01-23 PROCEDURE — A9270 NON-COVERED ITEM OR SERVICE: HCPCS | Mod: GY | Performed by: FAMILY MEDICINE

## 2019-01-23 PROCEDURE — 71260 CT THORAX DX C+: CPT

## 2019-01-23 PROCEDURE — A9270 NON-COVERED ITEM OR SERVICE: HCPCS | Mod: GY | Performed by: STUDENT IN AN ORGANIZED HEALTH CARE EDUCATION/TRAINING PROGRAM

## 2019-01-23 PROCEDURE — 25000125 ZZHC RX 250: Performed by: FAMILY MEDICINE

## 2019-01-23 PROCEDURE — 25000128 H RX IP 250 OP 636: Performed by: FAMILY MEDICINE

## 2019-01-23 PROCEDURE — 94640 AIRWAY INHALATION TREATMENT: CPT

## 2019-01-23 PROCEDURE — 40000275 ZZH STATISTIC RCP TIME EA 10 MIN

## 2019-01-23 PROCEDURE — 99222 1ST HOSP IP/OBS MODERATE 55: CPT | Performed by: FAMILY MEDICINE

## 2019-01-23 PROCEDURE — 25000128 H RX IP 250 OP 636: Performed by: STUDENT IN AN ORGANIZED HEALTH CARE EDUCATION/TRAINING PROGRAM

## 2019-01-23 PROCEDURE — 94640 AIRWAY INHALATION TREATMENT: CPT | Mod: 76

## 2019-01-23 PROCEDURE — 25500064 ZZH RX 255 OP 636: Performed by: FAMILY MEDICINE

## 2019-01-23 PROCEDURE — 25000132 ZZH RX MED GY IP 250 OP 250 PS 637: Mod: GY | Performed by: STUDENT IN AN ORGANIZED HEALTH CARE EDUCATION/TRAINING PROGRAM

## 2019-01-23 PROCEDURE — 25000131 ZZH RX MED GY IP 250 OP 636 PS 637: Mod: GY | Performed by: FAMILY MEDICINE

## 2019-01-23 PROCEDURE — 12000000 ZZH R&B MED SURG/OB

## 2019-01-23 PROCEDURE — 25000132 ZZH RX MED GY IP 250 OP 250 PS 637: Performed by: FAMILY MEDICINE

## 2019-01-23 RX ORDER — LACOSAMIDE 50 MG/1
50 TABLET ORAL 2 TIMES DAILY
Status: DISCONTINUED | OUTPATIENT
Start: 2019-01-23 | End: 2019-01-24 | Stop reason: CLARIF

## 2019-01-23 RX ORDER — AMOXICILLIN 250 MG
1 CAPSULE ORAL 2 TIMES DAILY PRN
Status: DISCONTINUED | OUTPATIENT
Start: 2019-01-23 | End: 2019-01-27 | Stop reason: HOSPADM

## 2019-01-23 RX ORDER — AMLODIPINE BESYLATE 5 MG/1
5 TABLET ORAL DAILY
Status: ON HOLD | COMMUNITY
End: 2023-02-16

## 2019-01-23 RX ORDER — DEXTROSE MONOHYDRATE 25 G/50ML
25-50 INJECTION, SOLUTION INTRAVENOUS
Status: DISCONTINUED | OUTPATIENT
Start: 2019-01-23 | End: 2019-01-27 | Stop reason: HOSPADM

## 2019-01-23 RX ORDER — FOLIC ACID 1 MG/1
1 TABLET ORAL DAILY
Status: DISCONTINUED | OUTPATIENT
Start: 2019-01-23 | End: 2019-01-27 | Stop reason: HOSPADM

## 2019-01-23 RX ORDER — CEFTRIAXONE SODIUM 1 G/50ML
1 INJECTION, SOLUTION INTRAVENOUS EVERY 24 HOURS
Status: DISCONTINUED | OUTPATIENT
Start: 2019-01-23 | End: 2019-01-25

## 2019-01-23 RX ORDER — ATORVASTATIN CALCIUM 10 MG/1
10 TABLET, FILM COATED ORAL AT BEDTIME
Status: DISCONTINUED | OUTPATIENT
Start: 2019-01-23 | End: 2019-01-27 | Stop reason: HOSPADM

## 2019-01-23 RX ORDER — ALBUTEROL SULFATE 90 UG/1
1-2 AEROSOL, METERED RESPIRATORY (INHALATION) EVERY 4 HOURS PRN
Status: DISCONTINUED | OUTPATIENT
Start: 2019-01-23 | End: 2019-01-23 | Stop reason: CLARIF

## 2019-01-23 RX ORDER — AMLODIPINE BESYLATE 5 MG/1
5 TABLET ORAL DAILY
Status: DISCONTINUED | OUTPATIENT
Start: 2019-01-23 | End: 2019-01-27 | Stop reason: HOSPADM

## 2019-01-23 RX ORDER — MULTIPLE VITAMINS W/ MINERALS TAB 9MG-400MCG
1 TAB ORAL DAILY
COMMUNITY

## 2019-01-23 RX ORDER — SODIUM CHLORIDE 1 G/1
4 TABLET ORAL
Status: DISCONTINUED | OUTPATIENT
Start: 2019-01-23 | End: 2019-01-27 | Stop reason: HOSPADM

## 2019-01-23 RX ORDER — MONTELUKAST SODIUM 5 MG/1
10 TABLET, CHEWABLE ORAL AT BEDTIME
Status: DISCONTINUED | OUTPATIENT
Start: 2019-01-23 | End: 2019-01-27 | Stop reason: HOSPADM

## 2019-01-23 RX ORDER — OXCARBAZEPINE 300 MG/1
1200 TABLET, FILM COATED ORAL 2 TIMES DAILY
Status: DISCONTINUED | OUTPATIENT
Start: 2019-01-23 | End: 2019-01-27 | Stop reason: HOSPADM

## 2019-01-23 RX ORDER — GLIPIZIDE 5 MG/1
2.5 TABLET ORAL DAILY
COMMUNITY

## 2019-01-23 RX ORDER — PREDNISONE 10 MG/1
10 TABLET ORAL DAILY
Status: DISCONTINUED | OUTPATIENT
Start: 2019-01-23 | End: 2019-01-23

## 2019-01-23 RX ORDER — ACETAMINOPHEN 325 MG/1
650 TABLET ORAL EVERY 6 HOURS PRN
Status: DISCONTINUED | OUTPATIENT
Start: 2019-01-23 | End: 2019-01-27 | Stop reason: HOSPADM

## 2019-01-23 RX ORDER — LORAZEPAM 0.5 MG/1
0.5 TABLET ORAL EVERY 4 HOURS PRN
Status: DISCONTINUED | OUTPATIENT
Start: 2019-01-23 | End: 2019-01-27 | Stop reason: HOSPADM

## 2019-01-23 RX ORDER — NALOXONE HYDROCHLORIDE 0.4 MG/ML
.1-.4 INJECTION, SOLUTION INTRAMUSCULAR; INTRAVENOUS; SUBCUTANEOUS
Status: DISCONTINUED | OUTPATIENT
Start: 2019-01-23 | End: 2019-01-27 | Stop reason: HOSPADM

## 2019-01-23 RX ORDER — IPRATROPIUM BROMIDE AND ALBUTEROL SULFATE 2.5; .5 MG/3ML; MG/3ML
3 SOLUTION RESPIRATORY (INHALATION)
Status: DISCONTINUED | OUTPATIENT
Start: 2019-01-23 | End: 2019-01-27 | Stop reason: HOSPADM

## 2019-01-23 RX ORDER — VITAMIN E 268 MG
400 CAPSULE ORAL 2 TIMES DAILY
Status: DISCONTINUED | OUTPATIENT
Start: 2019-01-23 | End: 2019-01-27 | Stop reason: HOSPADM

## 2019-01-23 RX ORDER — NICOTINE POLACRILEX 4 MG
15-30 LOZENGE BUCCAL
Status: DISCONTINUED | OUTPATIENT
Start: 2019-01-23 | End: 2019-01-27 | Stop reason: HOSPADM

## 2019-01-23 RX ORDER — OMEGA-3 FATTY ACIDS/FISH OIL 300-1000MG
1 CAPSULE ORAL
COMMUNITY

## 2019-01-23 RX ORDER — LACOSAMIDE 200 MG/1
200 TABLET ORAL 2 TIMES DAILY
Status: DISCONTINUED | OUTPATIENT
Start: 2019-01-23 | End: 2019-01-23 | Stop reason: CLARIF

## 2019-01-23 RX ORDER — ONDANSETRON 4 MG/1
4 TABLET, ORALLY DISINTEGRATING ORAL EVERY 6 HOURS PRN
Status: DISCONTINUED | OUTPATIENT
Start: 2019-01-23 | End: 2019-01-27 | Stop reason: HOSPADM

## 2019-01-23 RX ORDER — CEFTRIAXONE SODIUM 1 G/50ML
1 INJECTION, SOLUTION INTRAVENOUS ONCE
Status: DISCONTINUED | OUTPATIENT
Start: 2019-01-23 | End: 2019-01-27 | Stop reason: HOSPADM

## 2019-01-23 RX ORDER — LORAZEPAM 2 MG/ML
0.5 INJECTION INTRAMUSCULAR EVERY 4 HOURS PRN
Status: DISCONTINUED | OUTPATIENT
Start: 2019-01-23 | End: 2019-01-27 | Stop reason: HOSPADM

## 2019-01-23 RX ORDER — POTASSIUM CHLORIDE 1500 MG/1
20 TABLET, EXTENDED RELEASE ORAL 2 TIMES DAILY WITH MEALS
Status: DISCONTINUED | OUTPATIENT
Start: 2019-01-23 | End: 2019-01-27 | Stop reason: HOSPADM

## 2019-01-23 RX ORDER — BUDESONIDE 0.5 MG/2ML
1 INHALANT ORAL 2 TIMES DAILY
Status: DISCONTINUED | OUTPATIENT
Start: 2019-01-23 | End: 2019-01-27 | Stop reason: HOSPADM

## 2019-01-23 RX ORDER — ALBUTEROL SULFATE 0.83 MG/ML
2.5 SOLUTION RESPIRATORY (INHALATION) EVERY 4 HOURS PRN
Status: DISCONTINUED | OUTPATIENT
Start: 2019-01-23 | End: 2019-01-23

## 2019-01-23 RX ORDER — PREDNISONE 20 MG/1
20 TABLET ORAL DAILY
Status: DISCONTINUED | OUTPATIENT
Start: 2019-01-23 | End: 2019-01-27 | Stop reason: HOSPADM

## 2019-01-23 RX ORDER — ASPIRIN 81 MG/1
81 TABLET, CHEWABLE ORAL DAILY
Status: DISCONTINUED | OUTPATIENT
Start: 2019-01-23 | End: 2019-01-27 | Stop reason: HOSPADM

## 2019-01-23 RX ORDER — ALBUTEROL SULFATE 0.83 MG/ML
2.5 SOLUTION RESPIRATORY (INHALATION) EVERY 4 HOURS PRN
Status: DISCONTINUED | OUTPATIENT
Start: 2019-01-23 | End: 2019-01-27 | Stop reason: HOSPADM

## 2019-01-23 RX ORDER — ASCORBIC ACID 500 MG
500 TABLET ORAL 2 TIMES DAILY
Status: DISCONTINUED | OUTPATIENT
Start: 2019-01-23 | End: 2019-01-27 | Stop reason: HOSPADM

## 2019-01-23 RX ORDER — LOSARTAN POTASSIUM 50 MG/1
100 TABLET ORAL DAILY
Status: DISCONTINUED | OUTPATIENT
Start: 2019-01-23 | End: 2019-01-27 | Stop reason: HOSPADM

## 2019-01-23 RX ORDER — LACOSAMIDE 150 MG/1
150 TABLET ORAL 2 TIMES DAILY
Status: DISCONTINUED | OUTPATIENT
Start: 2019-01-23 | End: 2019-01-24 | Stop reason: CLARIF

## 2019-01-23 RX ORDER — METFORMIN HCL 500 MG
500 TABLET, EXTENDED RELEASE 24 HR ORAL
Status: DISCONTINUED | OUTPATIENT
Start: 2019-01-23 | End: 2019-01-23

## 2019-01-23 RX ORDER — ONDANSETRON 2 MG/ML
4 INJECTION INTRAMUSCULAR; INTRAVENOUS EVERY 6 HOURS PRN
Status: DISCONTINUED | OUTPATIENT
Start: 2019-01-23 | End: 2019-01-27 | Stop reason: HOSPADM

## 2019-01-23 RX ORDER — METFORMIN HCL 500 MG
500 TABLET, EXTENDED RELEASE 24 HR ORAL
Status: ON HOLD | COMMUNITY
End: 2019-01-23

## 2019-01-23 RX ADMIN — AMLODIPINE BESYLATE 5 MG: 5 TABLET ORAL at 08:29

## 2019-01-23 RX ADMIN — OXYCODONE HYDROCHLORIDE AND ACETAMINOPHEN 500 MG: 500 TABLET ORAL at 08:26

## 2019-01-23 RX ADMIN — POTASSIUM CHLORIDE 20 MEQ: 1500 TABLET, EXTENDED RELEASE ORAL at 08:28

## 2019-01-23 RX ADMIN — SODIUM CHLORIDE TAB 1 GM 4 G: 1 TAB at 17:18

## 2019-01-23 RX ADMIN — FOLIC ACID 1 MG: 1 TABLET ORAL at 08:29

## 2019-01-23 RX ADMIN — CALCIUM CARBONATE-VITAMIN D TAB 500 MG-200 UNIT 1 TABLET: 500-200 TAB at 08:29

## 2019-01-23 RX ADMIN — VITAMIN E CAP 400 UNIT 400 UNITS: 400 CAP at 08:27

## 2019-01-23 RX ADMIN — OXCARBAZEPINE 1200 MG: 300 TABLET ORAL at 08:25

## 2019-01-23 RX ADMIN — LACOSAMIDE 50 MG: 50 TABLET, FILM COATED ORAL at 19:36

## 2019-01-23 RX ADMIN — POTASSIUM CHLORIDE 20 MEQ: 1500 TABLET, EXTENDED RELEASE ORAL at 17:18

## 2019-01-23 RX ADMIN — SODIUM CHLORIDE 1750 MG: 900 INJECTION, SOLUTION INTRAVENOUS at 01:32

## 2019-01-23 RX ADMIN — BUDESONIDE 1 MG: 0.5 SUSPENSION RESPIRATORY (INHALATION) at 18:31

## 2019-01-23 RX ADMIN — ATORVASTATIN CALCIUM 10 MG: 10 TABLET, FILM COATED ORAL at 01:40

## 2019-01-23 RX ADMIN — MONTELUKAST SODIUM 10 MG: 5 TABLET, CHEWABLE ORAL at 01:40

## 2019-01-23 RX ADMIN — ATORVASTATIN CALCIUM 10 MG: 10 TABLET, FILM COATED ORAL at 21:50

## 2019-01-23 RX ADMIN — LACOSAMIDE 150 MG: 150 TABLET, FILM COATED ORAL at 19:36

## 2019-01-23 RX ADMIN — METFORMIN HYDROCHLORIDE 1000 MG: 1000 TABLET, FILM COATED ORAL at 11:01

## 2019-01-23 RX ADMIN — ASPIRIN 81 MG 81 MG: 81 TABLET ORAL at 08:29

## 2019-01-23 RX ADMIN — IPRATROPIUM BROMIDE AND ALBUTEROL SULFATE 3 ML: .5; 3 SOLUTION RESPIRATORY (INHALATION) at 18:31

## 2019-01-23 RX ADMIN — ENOXAPARIN SODIUM 40 MG: 40 INJECTION SUBCUTANEOUS at 11:01

## 2019-01-23 RX ADMIN — INSULIN ASPART 1 UNITS: 100 INJECTION, SOLUTION INTRAVENOUS; SUBCUTANEOUS at 17:18

## 2019-01-23 RX ADMIN — OXCARBAZEPINE 1200 MG: 300 TABLET ORAL at 21:51

## 2019-01-23 RX ADMIN — INSULIN ASPART 1 UNITS: 100 INJECTION, SOLUTION INTRAVENOUS; SUBCUTANEOUS at 12:19

## 2019-01-23 RX ADMIN — LOSARTAN POTASSIUM 100 MG: 50 TABLET, FILM COATED ORAL at 08:28

## 2019-01-23 RX ADMIN — OXYCODONE HYDROCHLORIDE AND ACETAMINOPHEN 500 MG: 500 TABLET ORAL at 21:50

## 2019-01-23 RX ADMIN — MONTELUKAST SODIUM 10 MG: 5 TABLET, CHEWABLE ORAL at 21:52

## 2019-01-23 RX ADMIN — IOHEXOL 100 ML: 350 INJECTION, SOLUTION INTRAVENOUS at 16:18

## 2019-01-23 RX ADMIN — CEFTRIAXONE SODIUM 1 G: 1 INJECTION, SOLUTION INTRAVENOUS at 21:54

## 2019-01-23 RX ADMIN — SODIUM CHLORIDE TAB 1 GM 4 G: 1 TAB at 12:22

## 2019-01-23 RX ADMIN — LACOSAMIDE 150 MG: 150 TABLET, FILM COATED ORAL at 11:22

## 2019-01-23 RX ADMIN — LACOSAMIDE 50 MG: 50 TABLET, FILM COATED ORAL at 11:22

## 2019-01-23 RX ADMIN — SODIUM CHLORIDE TAB 1 GM 4 G: 1 TAB at 08:27

## 2019-01-23 RX ADMIN — Medication 2.5 MG: at 12:34

## 2019-01-23 RX ADMIN — CALCIUM CARBONATE-VITAMIN D TAB 500 MG-200 UNIT 1 TABLET: 500-200 TAB at 17:18

## 2019-01-23 RX ADMIN — METFORMIN HYDROCHLORIDE 500 MG: 500 TABLET, FILM COATED ORAL at 17:29

## 2019-01-23 RX ADMIN — PREDNISONE 20 MG: 20 TABLET ORAL at 11:01

## 2019-01-23 RX ADMIN — IPRATROPIUM BROMIDE AND ALBUTEROL SULFATE 3 ML: .5; 3 SOLUTION RESPIRATORY (INHALATION) at 15:01

## 2019-01-23 RX ADMIN — AZITHROMYCIN MONOHYDRATE 500 MG: 500 INJECTION, POWDER, LYOPHILIZED, FOR SOLUTION INTRAVENOUS at 11:01

## 2019-01-23 RX ADMIN — VITAMIN E CAP 400 UNIT 400 UNITS: 400 CAP at 21:52

## 2019-01-23 ASSESSMENT — ACTIVITIES OF DAILY LIVING (ADL)
ADLS_ACUITY_SCORE: 23
ADLS_ACUITY_SCORE: 22
ADLS_ACUITY_SCORE: 22
ADLS_ACUITY_SCORE: 24
ADLS_ACUITY_SCORE: 22

## 2019-01-23 NOTE — PROGRESS NOTES
"Patient assisted to ambulate to bathroom.  Previously while resting in bed oxygen turned up to 5L to maintain Sp02>90%, RR 20-24.  Patient up to bathroom, denies any shortness of breath and states that \"I feel fine\".  RR 24 Sp02 82% on the 5L.  Patient had no distress, recovered back to 90% on the 5L with pursed lip breathing in less than two minutes.  Patient went right into finishing lunch and denies any feelings of sob with activity or at rest.  Patient rolling her eyes and annoyed with doing rest/breathing to bring up oxygen.  Lung sounds are diminished with crackles in bases.  Will continue to monitor.    "

## 2019-01-23 NOTE — H&P
[facility]    History and Physical  Hospitalist       Date of Admission:  1/22/2019    Assessment & Plan   Alicia Becker is a 63 year old female who presents with cough, fever, and shortness of breath.    Principal Problem:    Community acquired pneumonia, unspecified laterality    Assessment: Chest x-ray shows bilateral lower lobe infiltrates.  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    Plan: Respiratory therapy involvement.  Antibiotics.  CPAP.  Oxygen as needed.  Active Problems:    Essential (primary) hypertension    Assessment: Control is adequate    Plan: Continue current meds    Generalized seizure (H)    Assessment: This occurred in 2012 secondary to vasculitis.  Fairly minimal disability but does have weakness in the left leg necessitating the use of a brace, and some mild left hand weakness.    Plan: Continue current treatments for the seizure and for her vasculitis, increased prednisone dose during acute illness.    Hypoxia    Assessment: Improved on oxygen and CPAP    Plan: Rcat evaluation, oxygen, antibiotics, and CPAP    Immunocompromised patient (H)    Assessment: Appears to be stable.  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  We will continue her same medication except increase prednisone to 20 mg daily.    Plan: As above    Long term current use of systemic steroids    Assessment: For control of vasculitis    Plan: Prednisone increase as noted    Necrotizing vasculitis (H)    Assessment: Appears stable    Plan: Continue current treatments    Sleep apnea    Assessment: Stable on CPAP.  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  Blood sugars at home run 120-140 in the morning.  A1c 3 months ago was 7.6%.    Plan: Continue metformin,  monitor glucose and use low-dose NovoLog as needed.  Will check A1c.    DVT Prophylaxis: Pneumatic Compression Devices  Code Status: No Order full CODE STATUS    NUZHAT JACKSON    Primary Care Physician   Oriana Casarez    Chief Complaint   Fever, productive cough, and shortness of breath.    History is obtained from the patient, ED physician, and chart review.    History of Present Illness   Alicia Becker is a 63 year old female who presents with the above complaints.  She notes symptoms for about the last 4 days.  She has a history of necrotizing vasculitis, and is on chronic suppressive treatment with prednisone 10 mg daily and methotrexate injections once a week.  She had a stroke related to the vasculitis and then subsequent seizure disorder.  She has a history of bronchiectasis as well.    She developed increased shortness of breath associated with a cough productive of yellow and brown sputum over the last several days.  The cough got worse over the last 24 hours and she developed a fever into the low 100s.  She had difficulty breathing and some chest tightness.  Chest pain increased with deep inspiration.  She does have a history of a fall with rib fractures.  She had mild chills as well.    She also has sleep apnea and is on CPAP.  She states she has some difficulty with congestion in the right nostril.    Vasculitis symptoms have been fairly well controlled.    Past Medical History    I have reviewed this patient's medical history and updated it with pertinent information if needed.   History reviewed. No pertinent past medical history.    Past Surgical History   I have reviewed this patient's surgical history and updated it with pertinent information if needed.  Past Surgical History:   Procedure Laterality Date     AS FLEX SIGMOIDOSCOPY W BIOPSY  01/24/2014    [facility], results not in chart     COLONOSCOPY  02/03/2014    10 year fu 2/3/24       Prior to Admission Medications   Prior to Admission  "Medications   Prescriptions Last Dose Informant Patient Reported? Taking?   Calcium carb-Vitamin D 500 mg [place]-200 units (OSCAL WITH D;OYSTER SHELL CALCIUM) 500-200 MG-UNIT per tablet 1/22/2019 at AM  Yes No   Sig: Take 1 tablet by mouth 2 times daily   Methotrexate, PF, 10 MG/0.2ML SOAJ 1/18/2019 at AM Self Yes Yes   Sig: Inject 0.8 mLs Subcutaneous every 7 days    OXcarbazepine (TRILEPTAL) 600 MG tablet 1/22/2019 at AM  Yes Yes   Sig: Take 2 tablets by mouth 2 times daily   Saline (SODIUM CHLORIDE) 0.65 % SOLN 1/21/2019 at HS  Yes No   Sig: Spray 1 spray in nostril nightly as needed For nasal dryness   Tuberculin-Allergy Syringes (B-D TB SYRINGE) 25G X 5/8\" 1 ML MISC Unknown at Unknown time  Yes No   Sig: USE ONE NEW SYRINGE WITH EACH INJECTION EVERY 7 DAYS   acetaminophen (TYLENOL) 500 MG tablet 1/21/2019 at HS  Yes Yes   Sig: Take 2 tablets by mouth every night at bedtime, also may take 2 tablets by mouth every 6 hours as needed for pain. Max acetaminophen dose: 4000mg in 24 hrs.   albuterol (VENTOLIN HFA) 108 (90 BASE) MCG/ACT Inhaler 1/21/2019 at HS  Yes Yes   Sig: Inhale 1-2 puffs into the lungs every 4 hours as needed for shortness of breath / dyspnea Shake before using.   amLODIPine (NORVASC) 5 MG tablet 1/22/2019 at AM  Yes Yes   Sig: Take 5 mg by mouth daily   ascorbic acid (VITAMIN C) 500 MG tablet 1/22/2019 at lunch  Yes No   Sig: Take 500 mg by mouth daily    aspirin 81 MG chewable tablet 1/22/2019 at AM  Yes Yes   Sig: Take 81 mg by mouth daily With a meal   blood glucose monitoring (GOOD CONTOUR NEXT) test strip Unknown at Unknown time  Yes No   Sig: USE AS DIRECTED TESTING 3 TO 4 TIMES DAILY   blood glucose monitoring (OGOD MICROLET) lancets Unknown at Unknown time  Yes No   Sig: USE AS DIRECTED TESTING FOUR TIMES DAILY   folic acid (FOLVITE) 1 MG tablet 1/22/2019 at AM  Yes No   Sig: Take 1 mg by mouth daily   glipiZIDE (GLUCOTROL) 5 MG tablet 1/22/2019 at AM  Yes Yes   Sig: Take 2.5 mg by mouth " daily   glucagon 1 MG kit Unknown at Unknown time  Yes No   Sig: Inject 1 mg into the muscle once As needed for low blood sugar   ipratropium - albuterol 0.5 mg/2.5 mg/3 mL (DUONEB) 0.5-2.5 (3) MG/3ML neb solution 1/22/2019 at PM  Yes Yes   Sig: Inhale one neb by mouth once daily. When patient has a respiratory illness may increase to 2-3 times daily.   lacosamide (VIMPAT) 200 MG TABS tablet 1/22/2019 at AM  Yes Yes   Sig: Take 200 mg by mouth 2 times daily   losartan (COZAAR) 100 MG tablet 1/22/2019 at AM  Yes Yes   Sig: Take 100 mg by mouth daily   metFORMIN (GLUCOPHAGE) 500 MG tablet 1/22/2019 at AM  Yes Yes   Sig: Take by mouth 2 times daily (with meals) Take 2 tablets with breakfast and 1 with dinner   montelukast (SINGULAIR) 10 MG tablet 1/21/2019 at HS  Yes Yes   Sig: Take 10 mg by mouth At Bedtime   multivitamin w/minerals (MULTI-VITAMIN) tablet 1/22/2019 at lunch  Yes Yes   Sig: Take 1 tablet by mouth daily   omega 3 1000 MG CAPS 1/22/2019 at lunch  Yes Yes   Sig: Take 1 capsule by mouth 3 times daily (with meals)   potassium chloride SA (K-DUR/KLOR-CON M) 20 MEQ CR tablet 1/22/2019 at lunch  Yes Yes   Sig: Take 1 tablet by mouth 2 times daily (with meals) Do not crush.   predniSONE (DELTASONE) 1 MG tablet 1/22/2019 at AM  Yes Yes   Sig: Take 3-4 capsules by mouth once daily with 5 mg tablet to equate either 8 mg or 9 mg once daily alternating every other day. Continue with taper as directed.   predniSONE (DELTASONE) 5 MG tablet 1/22/2019 at AM  Yes Yes   Sig: Take 1 capsules by mouth once daily with 1 mg tablets to equate either 8 mg or 9 mg once daily alternating every other day. Continue with taper as directed.   ranitidine (ZANTAC) 150 MG tablet 1/22/2019 at AM  Yes Yes   Sig: Take 150 mg by mouth daily   simvastatin (ZOCOR) 20 MG tablet 1/21/2019 at HS  Yes Yes   Sig: Take 1 tablet by mouth At Bedtime   sodium chloride 1 GM tablet 1/22/2019 at lunch  Yes Yes   Sig: Take 4 tablets by mouth 3 times daily  (with meals)   vitamin E (VITAMIN E) 400 UNIT capsule 1/22/2019 at AM  Yes No   Sig: Take 400 Units by mouth daily       Facility-Administered Medications: None     Allergies   Allergies   Allergen Reactions     Ciprofloxacin      Other reaction(s): Seizures     Levofloxacin      Other reaction(s): Seizures     Quinolones      Other reaction(s): Seizures     Carbamazepine      Other reaction(s): Other - Describe In Comment Field  Ineffective for seizure control.     Levetiracetam      Other reaction(s): Other - Describe In Comment Field  Ineffective at 750 mg BID for seizure control.      Lisinopril      Other reaction(s): Yeast Infection     Niacin      Other reaction(s): *Unknown  Persistant flushing     Sulfa Drugs      Other reaction(s): *Unknown     Valproic Acid      Other reaction(s): Other - Describe In Comment Field  Ineffective for seizure control.        Social History   I have reviewed this patient's social history and updated it with pertinent information if needed. Alicia SCRUGGS Eugenio  reports that she has quit smoking. she has never used smokeless tobacco. She reports that she does not drink alcohol or use drugs.    Family History   I have reviewed this patient's family history and updated it with pertinent information if needed.   History reviewed. No pertinent family history.    Review of Systems     REVIEW OF SYSTEMS:    Constitutional: Decreased energy and appetite, no recent sick contacts  Eyes: no changes in vision  Ears, nose, mouth, throat, and face: no mouth sores, dysphagia, or odynophagia  Respiratory-see HPI  Cardiovascular: See HPI  Gastrointestinal: no constipation, diarrhea, nausea, vomiting or abdominal pain.  Genitourinary: no dysuria, hematuria, urgency or frequency.   Hematologic/lymphatic: no unintentional weight loss or night sweats.  Musculoskeletal: no pain to extremities or falls.   Neurological: no new weakness, tingling, numbness.  She has chronic weakness in the left leg and left  hand related to her old stroke  Psychiatric: no hallucinations ordelusions.  Endocrine: She is diabetic and states that blood sugars have been fairly well controlled        Physical Exam   Temp: 100  F (37.8  C) Temp src: Tympanic BP: 146/77 Pulse: 109 Heart Rate: 104 Resp: 22 SpO2: 92 % O2 Device: Nasal cannula Oxygen Delivery: 4 LPM(4.5)  Vital Signs with Ranges  Temp:  [99.6  F (37.6  C)-101.1  F (38.4  C)] 100  F (37.8  C)  Pulse:  [102-116] 109  Heart Rate:  [101-109] 104  Resp:  [18-35] 22  BP: (139-170)/(70-89) 146/77  SpO2:  [74 %-93 %] 92 %  188 lbs 11.42 oz    Constitutional: She is sitting up in the chair, pleasant and cooperative.  She does get slightly short of breath with fairly minimal exertion.  Eyes: Normal  HEENT: ENT exam is unremarkable.  Oxygen is in place by nasal cannula.  Neck is supple with good carotid pulses  Respiratory: Diffuse rales are heard bilaterally.  No rhonchi and no wheezes.  Cardiovascular: Distant tones, difficult to hear but no murmur gallop appreciated.  Trace peripheral edema.  No respiratory distress.  GI: Abdomen is soft and nontender with no organomegaly or masses, bowel sounds are normal  Lymph/Hematologic: No adenopathy, no bruising  Skin: No rashes noted.  Skin is warm and dry with no diaphoresis  Musculoskeletal: No muscle atrophy  Neurologic: Minimal left hemiparesis with left foot drop.  Mild decreased left hand  compared to the right.  Psychiatric: Alert and oriented with normal mood and affect    Data   Data reviewed today:  I personally reviewed the EKG tracing showing Sinus tachycardia, no acute change and the chest x-ray image(s) showing Elevated right hemidiaphragm, minimal bilateral lower lobe infiltrates..  White count is minimally elevated at 13,100.  Platelets and hemoglobin are normal.  Liver and renal functions are normal as well as electrolytes.  Glucose is elevated at 182, A1c pending  Recent Labs   Lab 01/22/19  2245   WBC 13.1*   HGB 11.8   MCV  102*         POTASSIUM 4.0   CHLORIDE 101   CO2 25   BUN 10   CR 0.44*   ANIONGAP 10   OSCAR 9.3   *   ALBUMIN 3.8   PROTTOTAL 7.0   BILITOTAL 0.4   ALKPHOS 48   ALT 12   AST 12*   TROPI <0.030       Recent Results (from the past 24 hour(s))   XR Chest 2 Views    Narrative    EXAM:    XR Chest, 2 Views     EXAM DATE/TIME:    1/22/2019 11:12 PM     CLINICAL HISTORY:    63 years old, female; Signs and symptoms; Cough; Additional info: Cough, SOB,   low o2 and fever     TECHNIQUE:    XR of the chest, 2 views.     COMPARISON:    CR XR CHEST 2 VIEWS PA AND LATERAL 12/27/2016 6:54 AM     FINDINGS:    Lungs:  Opacities in both bases may represent atelectasis or pneumonia.    Pleural space: Unremarkable. No pleural effusion. No pneumothorax.    Heart/Mediastinum:  Stable cardiac silhouette    Upper abdomen:  Elevated right hemidiaphragm    Bones/joints: Unremarkable.    Other findings:  EKG wires       Impression    IMPRESSION:   Opacities in both bases may represent atelectasis or pneumonia.     THIS DOCUMENT HAS BEEN ELECTRONICALLY SIGNED BY BOZENA REEVES MD

## 2019-01-23 NOTE — PHARMACY-ADMISSION MEDICATION HISTORY
Pharmacy -- Admission Medication Reconciliation    Prior to admission (PTA) medications were reviewed and the patient's PTA medication list was updated.    Sources Consulted: patient, Usman    The reliability of this Medication Reconciliation is: Reliability: Reliable    The following significant changes were made:  ADDED glipizide 2.5mg daily  ADDED glucagon kit prn  STOPPED Prilosec  ADDED ranitidine  CHANGED metformin xr to regular, current dose is 1000mg AM and 500mg PM  NOTED prednisone taper is currently at 9mg daily for the past week (patient is not alternating doses)  UPDATED OTCs    In addition, the patient's allergies were reviewed with the patient and updated as follows:   Allergies: Ciprofloxacin; Levofloxacin; Quinolones; Carbamazepine; Levetiracetam; Lisinopril; Niacin; Sulfa drugs; and Valproic acid    The pharmacist has reviewed with the patient that all personal medications should be removed from the building or locked in the belongings safe.  Patient shall only take medications ordered by the physician and administered by the nursing staff.       Medication barriers identified: none   Medication adherence concerns: none   Understanding of emergency medications: recently filled glucagon for addition of glipizide    Camille Keith [facility], 1/23/2019,  10:31 AM

## 2019-01-23 NOTE — PROGRESS NOTES
RT set up home CPAP with 5L oxygen bled in which patient does not use at home. Patient had a difficult time tolerating CPAP at first, then after 3 tries got it on and comfortable. CPAP working well at this time, pt's SpO2 90% with 5L bled in, CPAP settings 7-16 cmH2o autoset, nasal mask.     Hilary Wilson RRT

## 2019-01-23 NOTE — ED TRIAGE NOTES
Pt arrives to ED with c/o low oxygen levels and a fever. Pt had oxygen saturations in the 70's. Pt reports being on steroids for vasculitis. Pt has frequent productive cough. Pt is pale. Pt is not on home oxygen. Pt was immediately escorted to a room to be evaluated.    Ai Sorenson RN on 1/22/2019 at 9:51 PM

## 2019-01-23 NOTE — ED NOTES
Pt assisted to commode to void. Sats decreased to 86% on 4.5 L nasal cannula after activity. Pt was short of breath. Recovered to low 90's on nasal cannula

## 2019-01-23 NOTE — ED PROVIDER NOTES
History   No chief complaint on file.    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 She reports cough productive of yellowish and brownish sputum started few days ago. Her cough got worse yesterday night with development of fever with temps in 100's and today she was having chest tightness and checked her oxy saturation which was recorded in the 70's. She reports left sided chest pain with deep inspiration and says that last week she fell on her left sided and fractured some of her left ribs but it was fixed by a chiropractor. She reports occasional leg swelling.     Allergies:  Allergies   Allergen Reactions     Ciprofloxacin      Other reaction(s): Seizures     Levofloxacin      Other reaction(s): Seizures     Quinolones      Other reaction(s): Seizures     Carbamazepine      Other reaction(s): Other - Describe In Comment Field  Ineffective for seizure control.     Levetiracetam      Other reaction(s): Other - Describe In Comment Field  Ineffective at 750 mg BID for seizure control.      Lisinopril      Other reaction(s): Yeast Infection     Niacin      Other reaction(s): *Unknown  Persistant flushing     Sulfa Drugs      Other reaction(s): *Unknown     Valproic Acid      Other reaction(s): Other - Describe In Comment Field  Ineffective for seizure control.        Problem List:    Patient Active Problem List    Diagnosis Date Noted     Closed fracture of distal end of left radius with routine healing 12/27/2016     Priority: Medium     Cerebral hyponatremia 12/25/2016     Priority: Medium     Immunocompromised patient (H) 12/25/2016     Priority: Medium     Long term current use of systemic steroids 12/25/2016     Priority: Medium     Seizure disorder (H) 12/25/2016     Priority: Medium     Weakness of left side of body  12/23/2016     Priority: Medium     Closed Colles' fracture of left radius 12/21/2016     Priority: Medium     Community acquired pneumonia 06/07/2016     Priority: Medium     Fibrosis of lung (H) 06/07/2016     Priority: Medium     Hypoxia 06/07/2016     Priority: Medium     Ischemic stroke (H) 02/11/2016     Priority: Medium     Polyneuropathy in diseases classified elsewhere (H) 02/11/2016     Priority: Medium     Abnormal chest sounds 01/29/2015     Priority: Medium     Overview:   Overview:   Rhonchi heard on exam when patient is asymptomatic and has normal CXR.       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   BP at goal.   On losartan.   On ASA.   microalbumin 2015  A1C 6.9 2015  Eye Exam 10/2014: no retinopathy.  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 2015 - follow up pulm nodules - if stable no further chest images necessary.       Generalized seizure (H) 03/13/2012     Priority: Medium     Overview:   Overview:   Needs neurology follow up in March - July 2015 with new  neurologist.       Essential (primary) hypertension 05/30/2008     Priority: Medium     Overview:   Overview:   IMO Update       Hypercholesterolemia 05/30/2008     Priority: Medium     Sleep apnea 05/30/2008     Priority: Medium        Past Medical History:    History reviewed. No pertinent past medical history.    Past Surgical History:    Past Surgical History:   Procedure Laterality Date     AS FLEX SIGMOIDOSCOPY W BIOPSY  01/24/2014    [place], results not in chart     COLONOSCOPY  02/03/2014    10 year fu 2/3/24       Family History:    History reviewed. No pertinent family history.    Social History:  Marital Status:   [2]  Social History     Tobacco Use     Smoking status: Former Smoker     Smokeless tobacco: Never Used   Substance Use Topics     Alcohol use: No     Drug use: No     Comment: Drug use: No        Medications:      acetaminophen (TYLENOL) 500 MG tablet   albuterol (VENTOLIN HFA) 108 (90 BASE) MCG/ACT Inhaler   amLODIPine (NORVASC) 10 MG tablet   aspirin 81 MG chewable tablet   ipratropium - albuterol 0.5 mg/2.5 mg/3 mL (DUONEB) 0.5-2.5 (3) MG/3ML neb solution   lacosamide (VIMPAT) 200 MG TABS tablet   losartan (COZAAR) 100 MG tablet   metFORMIN (GLUCOPHAGE-XR) 500 MG 24 hr tablet   Methotrexate, PF, 10 MG/0.2ML SOAJ   montelukast (SINGULAIR) 10 MG tablet   OXcarbazepine (TRILEPTAL) 600 MG tablet   potassium chloride SA (K-DUR/KLOR-CON M) 20 MEQ CR tablet   predniSONE (DELTASONE) 1 MG tablet   predniSONE (DELTASONE) 5 MG tablet   simvastatin (ZOCOR) 20 MG tablet   sodium chloride 1 GM tablet   ascorbic acid (VITAMIN C) 500 MG tablet   blood glucose monitoring (GOOD CONTOUR NEXT) test strip   blood glucose monitoring (GOOD MICROLET) lancets   Calcium carb-Vitamin D 500 mg [place]-200 units (OSCAL WITH D;OYSTER SHELL CALCIUM) 500-200 MG-UNIT per tablet   folic acid (FOLVITE) 1 MG tablet   OMEGA-3 FATTY ACIDS-VITAMIN E PO   omeprazole (PRILOSEC) 40 MG capsule   oxymetazoline (12 HOUR NASAL  "SPRAY) 0.05 % spray   Saline (SODIUM CHLORIDE) 0.65 % SOLN   senna-docusate (SENOKOT-S;PERICOLACE) 8.6-50 MG per tablet   Tuberculin-Allergy Syringes (B-D TB SYRINGE) 25G X 5/8\" 1 ML MISC   vitamin E (VITAMIN E) 400 UNIT capsule         Review of Systems   Constitutional: Positive for fever.   HENT: Negative for congestion and rhinorrhea.    Respiratory: Positive for cough, chest tightness and shortness of breath. Negative for wheezing.    Cardiovascular: Positive for chest pain. Negative for leg swelling.   Gastrointestinal: Negative for abdominal pain, nausea and vomiting.   Genitourinary: Negative for dysuria.   Neurological: Negative for dizziness, weakness and light-headedness.   Psychiatric/Behavioral: Negative for agitation.       Physical Exam   BP: 148/89  Pulse: 116  Heart Rate: 108  Temp: 100.1  F (37.8  C)  Resp: 18  SpO2: (!) 74 %      Physical Exam   Constitutional: She is oriented to person, place, and time. She appears well-developed. She appears distressed.   HENT:   Head: Normocephalic and atraumatic.   Eyes: Pupils are equal, round, and reactive to light.   Neck: Normal range of motion. No JVD present.   Cardiovascular: Normal rate, regular rhythm and normal heart sounds.   Pulmonary/Chest: She is in respiratory distress. She has no wheezes.   Tachypnea with oxygen sat of 91% on 4 liters NC. Bi basal crepitus.   Musculoskeletal: Normal range of motion. She exhibits no edema.   Neurological: She is alert and oriented to person, place, and time.   Skin: Skin is warm.       ED Course        Procedures               EKG Interpretation:      Interpreted by Remigio Ribeiro  Time reviewed: 21:55  Symptoms at time of EKG: Dyspnea   Rhythm: sinus tachycardia  Rate: 108  Axis: Normal  Ectopy: none  Conduction: normal  ST Segments/ T Waves: No ST-T wave changes  Q Waves: none  Comparison to prior: Unchanged    Clinical Impression: sinus tachycardia      Critical Care time:  none  Results for orders placed " or performed during the hospital encounter of 01/22/19 (from the past 24 hour(s))   CBC with platelets differential   Result Value Ref Range    WBC 13.1 (H) 4.0 - 11.0 10e9/L    RBC Count 3.49 (L) 3.8 - 5.2 10e12/L    Hemoglobin 11.8 11.7 - 15.7 g/dL    Hematocrit 35.5 35.0 - 47.0 %     (H) 78 - 100 fl    MCH 33.8 (H) 26.5 - 33.0 pg    MCHC 33.2 31.5 - 36.5 g/dL    RDW 14.7 10.0 - 15.0 %    Platelet Count 188 150 - 450 10e9/L    Diff Method Automated Method     % Neutrophils 86.0 %    % Lymphocytes 6.5 %    % Monocytes 6.8 %    % Eosinophils 0.1 %    % Basophils 0.3 %    % Immature Granulocytes 0.3 %    Absolute Neutrophil 11.2 (H) 1.6 - 8.3 10e9/L    Absolute Lymphocytes 0.9 0.8 - 5.3 10e9/L    Absolute Monocytes 0.9 0.0 - 1.3 10e9/L    Absolute Eosinophils 0.0 0.0 - 0.7 10e9/L    Absolute Basophils 0.0 0.0 - 0.2 10e9/L    Abs Immature Granulocytes 0.0 0 - 0.4 10e9/L   Comprehensive metabolic panel   Result Value Ref Range    Sodium 136 134 - 144 mmol/L    Potassium 4.0 3.5 - 5.1 mmol/L    Chloride 101 98 - 107 mmol/L    Carbon Dioxide 25 21 - 31 mmol/L    Anion Gap 10 3 - 14 mmol/L    Glucose 182 (H) 70 - 105 mg/dL    Urea Nitrogen 10 7 - 25 mg/dL    Creatinine 0.44 (L) 0.60 - 1.20 mg/dL    GFR Estimate >90 >60 mL/min/[1.73_m2]    GFR Estimate If Black >90 >60 mL/min/[1.73_m2]    Calcium 9.3 8.6 - 10.3 mg/dL    Bilirubin Total 0.4 0.3 - 1.0 mg/dL    Albumin 3.8 3.5 - 5.7 g/dL    Protein Total 7.0 6.4 - 8.9 g/dL    Alkaline Phosphatase 48 34 - 104 U/L    ALT 12 7 - 52 U/L    AST 12 (L) 13 - 39 U/L   Troponin I   Result Value Ref Range    Troponin I ES <0.030 0.000 - 0.034 ug/L   Nt probnp inpatient   Result Value Ref Range    N-Terminal Pro BNP Inpatient 61 0 - 100 pg/mL   D-Dimer ([facility])   Result Value Ref Range    D-Dimer ng/mL 327 (H) 0 - 230 ng/ml D-DU   Lactic acid   Result Value Ref Range    Lactic Acid 1.9 0.5 - 2.2 mmol/L   Blood gas arterial and oxyhgb   Result Value Ref Range    pH Arterial 7.37  7.35 - 7.45 pH    pCO2 Arterial 46 (H) 35 - 45 mm Hg    pO2 Arterial 78 (L) 83 - 108 mm Hg    Bicarbonate Arterial 26 22 - 28 mmol/L    FIO2 0     Oxyhemoglobin Arterial 93 (L) >95 %   XR Chest 2 Views    Narrative    EXAM:    XR Chest, 2 Views     EXAM DATE/TIME:    1/22/2019 11:12 PM     CLINICAL HISTORY:    63 years old, female; Signs and symptoms; Cough; Additional info: Cough, SOB,   low o2 and fever     TECHNIQUE:    XR of the chest, 2 views.     COMPARISON:    CR XR CHEST 2 VIEWS PA AND LATERAL 12/27/2016 6:54 AM     FINDINGS:    Lungs:  Opacities in both bases may represent atelectasis or pneumonia.    Pleural space: Unremarkable. No pleural effusion. No pneumothorax.    Heart/Mediastinum:  Stable cardiac silhouette    Upper abdomen:  Elevated right hemidiaphragm    Bones/joints: Unremarkable.    Other findings:  EKG wires       Impression    IMPRESSION:   Opacities in both bases may represent atelectasis or pneumonia.     THIS DOCUMENT HAS BEEN ELECTRONICALLY SIGNED BY BOZENA REEVES MD       Medications   cefTRIAXone in d5w (ROCEPHIN) intermittent infusion 1 g ( Intravenous Canceled Entry 1/23/19 0207)   acetaminophen (TYLENOL) tablet 650 mg (not administered)   albuterol (PROAIR HFA/PROVENTIL HFA/VENTOLIN HFA) 108 (90 Base) MCG/ACT inhaler 1-2 puff (not administered)   amLODIPine (NORVASC) tablet 5 mg (not administered)   vitamin C (ASCORBIC ACID) tablet 500 mg (not administered)   aspirin (ASA) chewable tablet 81 mg (not administered)   calcium carbonate-vitamin D (OSCAL w/D) per tablet 1 tablet (not administered)   folic acid (FOLVITE) tablet 1 mg (not administered)   lacosamide (VIMPAT) tablet 200 mg (not administered)   losartan (COZAAR) tablet 100 mg (not administered)   metFORMIN (GLUCOPHAGE-XR) 24 hr tablet 500 mg (not administered)   montelukast (SINGULAIR) chewable tablet 10 mg (10 mg Oral Given 1/23/19 0140)   OXcarbazepine (TRILEPTAL) tablet 1,200 mg (not administered)   potassium chloride ER  (K-DUR/KLOR-CON M) CR tablet 20 mEq (not administered)   predniSONE (DELTASONE) tablet 10 mg (not administered)   senna-docusate (SENOKOT-S/PERICOLACE) 8.6-50 MG per tablet 1 tablet (not administered)   atorvastatin (LIPITOR) tablet 10 mg (10 mg Oral Given 1/23/19 0140)   sodium chloride tablet 4 g (not administered)   vitamin E (TOCOPHEROL) 400 units (180 mg) capsule 400 Units (not administered)   naloxone (NARCAN) injection 0.1-0.4 mg (not administered)   ondansetron (ZOFRAN-ODT) ODT tab 4 mg (not administered)     Or   ondansetron (ZOFRAN) injection 4 mg (not administered)   LORazepam (ATIVAN) injection 0.5 mg (not administered)     Or   LORazepam (ATIVAN) tablet 0.5 mg (not administered)   ipratropium - albuterol 0.5 mg/2.5 mg/3 mL (DUONEB) neb solution 3 mL (not administered)   0.9% sodium chloride BOLUS (0 mLs Intravenous Stopped 1/22/19 2306)   OXcarbazepine (TRILEPTAL) tablet 600 mg (600 mg Oral Given 1/22/19 2248)   Vancomycin HCl (VANCOCIN) 1,750 mg in sodium chloride 0.9 % 500 mL intermittent infusion (1,750 mg Intravenous New Bag 1/23/19 0132)       Assessments & Plan (with Medical Decision Making)   This 63 year old female with history of vasculitis who is on chronic steroid and immunosuppressant presents to the ER with productive cough, fever and SOB. She has bilateral bi basal crepitations. She was found to be hypoxic from blood gas and CXR revealed bi basal opacities consistent with pneumonia. D-dimer was elevated but was less than 3 times upper limit of normal in the setting of her age and vasculitis hence not remarkable. EKG was suggestive of sinus tachycardia likely due to her infection and troponin draw was WNL. BNP excluded CHF and chest xray as well without pulmonary edema. Blood cultures were collected and patient was started on dual antibiotics. She received supplemental oxygen and was admitted to the medical floor. Her home medications where continued as well as her nocturnal CPAP. She was  subsequently signed out to the Hospitalist, Dr. Hernandez.     I have reviewed the nursing notes.    I have reviewed the findings, diagnosis, plan and need for follow up with the patient.       Medication List      There are no discharge medications for this visit.         Final diagnoses:   Community acquired bacterial pneumonia   Hypoxemia   JAIMEE (obstructive sleep apnea)   Current chronic use of systemic steroids       1/22/2019   [facility]     Remigio Ribeiro MD  01/23/19 0106

## 2019-01-23 NOTE — PROGRESS NOTES
Admission Note    Data:  Alicia Becker admitted to 334 from emergency room at 0050.      Action:   has been notified of admission. Pt oriented to unit, call light in reach.     Response:  Patient tolerated transfer well.  Karen Stanton RN.............................1/23/2019 5:35 AM

## 2019-01-23 NOTE — PROGRESS NOTES
Oxygen saturations continue to run low, requiring 5 L to stay above 90%.  Patient denied shortness of breath to RN, but is obviously short of breath with fairly minimal exertion.  Respiratory therapy has noted this as well.    Exam is unchanged from this morning.  Chest x-ray was reviewed.  Laboratory studies reviewed.  There was minimal elevation of d-dimer.    Plan: Respiratory therapy, continue current meds, add Pulmicort, obtain CT scan to check for PE and/or confirm current diagnosis.  Patient states that she does have 2 or 3 lung nodules that have been followed and have not changed over the last several years.  NUZHAT JACKSON MD on 1/23/2019 at 3:49 PM

## 2019-01-24 ENCOUNTER — APPOINTMENT (OUTPATIENT)
Dept: CARDIOLOGY | Facility: OTHER | Age: 64
End: 2019-01-24
Attending: FAMILY MEDICINE
Payer: COMMERCIAL

## 2019-01-24 PROBLEM — I27.20 PULMONARY HYPERTENSION (H): Status: ACTIVE | Noted: 2019-01-24

## 2019-01-24 LAB
ANION GAP SERPL CALCULATED.3IONS-SCNC: 9 MMOL/L (ref 3–14)
BASOPHILS # BLD AUTO: 0 10E9/L (ref 0–0.2)
BASOPHILS NFR BLD AUTO: 0.2 %
BUN SERPL-MCNC: 9 MG/DL (ref 7–25)
CALCIUM SERPL-MCNC: 9.2 MG/DL (ref 8.6–10.3)
CHLORIDE SERPL-SCNC: 97 MMOL/L (ref 98–107)
CO2 SERPL-SCNC: 31 MMOL/L (ref 21–31)
CREAT SERPL-MCNC: 0.43 MG/DL (ref 0.6–1.2)
DIFFERENTIAL METHOD BLD: ABNORMAL
EOSINOPHIL # BLD AUTO: 0.2 10E9/L (ref 0–0.7)
EOSINOPHIL NFR BLD AUTO: 2 %
ERYTHROCYTE [DISTWIDTH] IN BLOOD BY AUTOMATED COUNT: 14.5 % (ref 10–15)
GFR SERPL CREATININE-BSD FRML MDRD: >90 ML/MIN/{1.73_M2}
GLUCOSE SERPL-MCNC: 195 MG/DL (ref 70–105)
HCT VFR BLD AUTO: 33.4 % (ref 35–47)
HGB BLD-MCNC: 10.7 G/DL (ref 11.7–15.7)
IMM GRANULOCYTES # BLD: 0 10E9/L (ref 0–0.4)
IMM GRANULOCYTES NFR BLD: 0.2 %
LYMPHOCYTES # BLD AUTO: 0.6 10E9/L (ref 0.8–5.3)
LYMPHOCYTES NFR BLD AUTO: 5.9 %
MCH RBC QN AUTO: 33 PG (ref 26.5–33)
MCHC RBC AUTO-ENTMCNC: 32 G/DL (ref 31.5–36.5)
MCV RBC AUTO: 103 FL (ref 78–100)
MONOCYTES # BLD AUTO: 0.9 10E9/L (ref 0–1.3)
MONOCYTES NFR BLD AUTO: 9.7 %
NEUTROPHILS # BLD AUTO: 7.7 10E9/L (ref 1.6–8.3)
NEUTROPHILS NFR BLD AUTO: 82 %
PLATELET # BLD AUTO: 184 10E9/L (ref 150–450)
POTASSIUM SERPL-SCNC: 4 MMOL/L (ref 3.5–5.1)
RBC # BLD AUTO: 3.24 10E12/L (ref 3.8–5.2)
SODIUM SERPL-SCNC: 137 MMOL/L (ref 134–144)
WBC # BLD AUTO: 9.4 10E9/L (ref 4–11)

## 2019-01-24 PROCEDURE — 25000125 ZZHC RX 250: Performed by: FAMILY MEDICINE

## 2019-01-24 PROCEDURE — 40000275 ZZH STATISTIC RCP TIME EA 10 MIN

## 2019-01-24 PROCEDURE — 94640 AIRWAY INHALATION TREATMENT: CPT | Mod: 76

## 2019-01-24 PROCEDURE — 94799 UNLISTED PULMONARY SVC/PX: CPT

## 2019-01-24 PROCEDURE — 94640 AIRWAY INHALATION TREATMENT: CPT

## 2019-01-24 PROCEDURE — 25000132 ZZH RX MED GY IP 250 OP 250 PS 637: Performed by: STUDENT IN AN ORGANIZED HEALTH CARE EDUCATION/TRAINING PROGRAM

## 2019-01-24 PROCEDURE — A9270 NON-COVERED ITEM OR SERVICE: HCPCS | Mod: GY | Performed by: STUDENT IN AN ORGANIZED HEALTH CARE EDUCATION/TRAINING PROGRAM

## 2019-01-24 PROCEDURE — 93306 TTE W/DOPPLER COMPLETE: CPT

## 2019-01-24 PROCEDURE — 93306 TTE W/DOPPLER COMPLETE: CPT | Mod: 26 | Performed by: INTERNAL MEDICINE

## 2019-01-24 PROCEDURE — 99232 SBSQ HOSP IP/OBS MODERATE 35: CPT | Performed by: FAMILY MEDICINE

## 2019-01-24 PROCEDURE — 36415 COLL VENOUS BLD VENIPUNCTURE: CPT | Performed by: FAMILY MEDICINE

## 2019-01-24 PROCEDURE — 25000132 ZZH RX MED GY IP 250 OP 250 PS 637: Mod: GY | Performed by: FAMILY MEDICINE

## 2019-01-24 PROCEDURE — 80048 BASIC METABOLIC PNL TOTAL CA: CPT | Performed by: FAMILY MEDICINE

## 2019-01-24 PROCEDURE — A9270 NON-COVERED ITEM OR SERVICE: HCPCS | Mod: GY | Performed by: FAMILY MEDICINE

## 2019-01-24 PROCEDURE — 85025 COMPLETE CBC W/AUTO DIFF WBC: CPT | Performed by: STUDENT IN AN ORGANIZED HEALTH CARE EDUCATION/TRAINING PROGRAM

## 2019-01-24 PROCEDURE — 25000128 H RX IP 250 OP 636: Performed by: FAMILY MEDICINE

## 2019-01-24 PROCEDURE — 12000000 ZZH R&B MED SURG/OB

## 2019-01-24 PROCEDURE — 40000809 ZZH STATISTIC NO DOCUMENTATION TO SUPPORT CHARGE

## 2019-01-24 RX ORDER — LACOSAMIDE 200 MG/1
200 TABLET ORAL 2 TIMES DAILY
Status: DISCONTINUED | OUTPATIENT
Start: 2019-01-24 | End: 2019-01-27 | Stop reason: HOSPADM

## 2019-01-24 RX ORDER — FUROSEMIDE 10 MG/ML
40 INJECTION INTRAMUSCULAR; INTRAVENOUS ONCE
Status: COMPLETED | OUTPATIENT
Start: 2019-01-24 | End: 2019-01-24

## 2019-01-24 RX ADMIN — METFORMIN HYDROCHLORIDE 500 MG: 500 TABLET, FILM COATED ORAL at 17:30

## 2019-01-24 RX ADMIN — AMLODIPINE BESYLATE 5 MG: 5 TABLET ORAL at 09:38

## 2019-01-24 RX ADMIN — ACETAMINOPHEN 650 MG: 325 TABLET, FILM COATED ORAL at 03:56

## 2019-01-24 RX ADMIN — POTASSIUM CHLORIDE 20 MEQ: 1500 TABLET, EXTENDED RELEASE ORAL at 08:28

## 2019-01-24 RX ADMIN — Medication 2.5 MG: at 08:29

## 2019-01-24 RX ADMIN — IPRATROPIUM BROMIDE AND ALBUTEROL SULFATE 3 ML: .5; 3 SOLUTION RESPIRATORY (INHALATION) at 14:44

## 2019-01-24 RX ADMIN — OXCARBAZEPINE 1200 MG: 300 TABLET ORAL at 09:38

## 2019-01-24 RX ADMIN — INSULIN ASPART 3 UNITS: 100 INJECTION, SOLUTION INTRAVENOUS; SUBCUTANEOUS at 17:31

## 2019-01-24 RX ADMIN — PREDNISONE 20 MG: 20 TABLET ORAL at 08:28

## 2019-01-24 RX ADMIN — MONTELUKAST SODIUM 10 MG: 5 TABLET, CHEWABLE ORAL at 21:57

## 2019-01-24 RX ADMIN — OXYCODONE HYDROCHLORIDE AND ACETAMINOPHEN 500 MG: 500 TABLET ORAL at 21:57

## 2019-01-24 RX ADMIN — LACOSAMIDE 200 MG: 200 TABLET, FILM COATED ORAL at 19:11

## 2019-01-24 RX ADMIN — BUDESONIDE 1 MG: 0.5 SUSPENSION RESPIRATORY (INHALATION) at 19:21

## 2019-01-24 RX ADMIN — OXCARBAZEPINE 1200 MG: 300 TABLET ORAL at 21:57

## 2019-01-24 RX ADMIN — ENOXAPARIN SODIUM 40 MG: 40 INJECTION SUBCUTANEOUS at 09:38

## 2019-01-24 RX ADMIN — AZITHROMYCIN MONOHYDRATE 500 MG: 500 INJECTION, POWDER, LYOPHILIZED, FOR SOLUTION INTRAVENOUS at 09:35

## 2019-01-24 RX ADMIN — LACOSAMIDE 50 MG: 50 TABLET, FILM COATED ORAL at 08:29

## 2019-01-24 RX ADMIN — IPRATROPIUM BROMIDE AND ALBUTEROL SULFATE 3 ML: .5; 3 SOLUTION RESPIRATORY (INHALATION) at 07:28

## 2019-01-24 RX ADMIN — SODIUM CHLORIDE TAB 1 GM 4 G: 1 TAB at 17:30

## 2019-01-24 RX ADMIN — METFORMIN HYDROCHLORIDE 1000 MG: 1000 TABLET, FILM COATED ORAL at 08:28

## 2019-01-24 RX ADMIN — INSULIN ASPART 3 UNITS: 100 INJECTION, SOLUTION INTRAVENOUS; SUBCUTANEOUS at 11:45

## 2019-01-24 RX ADMIN — ATORVASTATIN CALCIUM 10 MG: 10 TABLET, FILM COATED ORAL at 21:57

## 2019-01-24 RX ADMIN — CEFTRIAXONE SODIUM 1 G: 1 INJECTION, SOLUTION INTRAVENOUS at 22:00

## 2019-01-24 RX ADMIN — LOSARTAN POTASSIUM 100 MG: 50 TABLET, FILM COATED ORAL at 09:38

## 2019-01-24 RX ADMIN — SODIUM CHLORIDE TAB 1 GM 4 G: 1 TAB at 08:28

## 2019-01-24 RX ADMIN — FUROSEMIDE 40 MG: 10 INJECTION, SOLUTION INTRAMUSCULAR; INTRAVENOUS at 11:44

## 2019-01-24 RX ADMIN — OXYCODONE HYDROCHLORIDE AND ACETAMINOPHEN 500 MG: 500 TABLET ORAL at 09:38

## 2019-01-24 RX ADMIN — CALCIUM CARBONATE-VITAMIN D TAB 500 MG-200 UNIT 1 TABLET: 500-200 TAB at 17:30

## 2019-01-24 RX ADMIN — LACOSAMIDE 150 MG: 150 TABLET, FILM COATED ORAL at 08:29

## 2019-01-24 RX ADMIN — FOLIC ACID 1 MG: 1 TABLET ORAL at 09:38

## 2019-01-24 RX ADMIN — CALCIUM CARBONATE-VITAMIN D TAB 500 MG-200 UNIT 1 TABLET: 500-200 TAB at 08:28

## 2019-01-24 RX ADMIN — ASPIRIN 81 MG 81 MG: 81 TABLET ORAL at 08:28

## 2019-01-24 RX ADMIN — SODIUM CHLORIDE TAB 1 GM 4 G: 1 TAB at 11:44

## 2019-01-24 RX ADMIN — IPRATROPIUM BROMIDE AND ALBUTEROL SULFATE 3 ML: .5; 3 SOLUTION RESPIRATORY (INHALATION) at 19:20

## 2019-01-24 RX ADMIN — POTASSIUM CHLORIDE 20 MEQ: 1500 TABLET, EXTENDED RELEASE ORAL at 11:44

## 2019-01-24 RX ADMIN — VITAMIN E CAP 400 UNIT 400 UNITS: 400 CAP at 21:57

## 2019-01-24 RX ADMIN — BUDESONIDE 1 MG: 0.5 SUSPENSION RESPIRATORY (INHALATION) at 07:30

## 2019-01-24 RX ADMIN — INSULIN ASPART 1 UNITS: 100 INJECTION, SOLUTION INTRAVENOUS; SUBCUTANEOUS at 08:29

## 2019-01-24 RX ADMIN — VITAMIN E CAP 400 UNIT 400 UNITS: 400 CAP at 09:38

## 2019-01-24 ASSESSMENT — ACTIVITIES OF DAILY LIVING (ADL)
ADLS_ACUITY_SCORE: 24
ADLS_ACUITY_SCORE: 26
ADLS_ACUITY_SCORE: 24
ADLS_ACUITY_SCORE: 26
ADLS_ACUITY_SCORE: 24
ADLS_ACUITY_SCORE: 24

## 2019-01-24 NOTE — PROGRESS NOTES
RESPIRATORY THERAPY ASSESSMENT    Assessment:     Reason for Assessment: Other (see comments) (01/23/19 1414)    Pulmonary History:    Pulmonary Status: Smoking history, greater than or equal to 1 PPD(Pneumonia, hypoxia) (01/23/19 1414)    Smoking cessation information given: No     Surgical:  Surgical Status: No surgery or greater than/equal 4 weeks post-op (01/23/19 1414)    CXR:   Chest X-ray: Bilateral pulmonary infiltrates (01/23/19 1414)    Respiratory Pattern:    Respiratory Pattern: Regular pattern 12-20 (01/23/19 1414)    Breath Sounds:    Breath Sounds: Crackles (01/23/19 1414)    Effectiveness of Cough:     Cough Effectiveness: Strong, productive (01/23/19 1414)    Mental Status:    Mental Status: Alert, oriented, cooperative (01/23/19 1414)    Level of Activity:     Acuity Level : Acuity 3 (11-15 points) (01/23/19 1414)    Current O2 Requirement:   O2 Required for SpO2>=92%: 4-6 liters (01/23/19 1414)    Patient uses home oxygen:   No    Does the patient have a diagnosis of COPD? RT Sania on 1/23/2019 at 7:09 PM

## 2019-01-24 NOTE — PROGRESS NOTES
[facility]    Hospitalist Progress Note      Assessment & Plan   Alicia Becker is a 63 year old female who was admitted on 1/22/2019.     Principal Problem:    Community acquired pneumonia, unspecified laterality    Assessment: Minimal improvement thus far clinically.  White blood count is down.  CT scan revealed diffuse bilateral bronchopneumonia.  No pulmonary embolus.  There was dilatation of the pulmonic  artery.    Plan: Continue current antibiotics.  Continue current steroid dose.  Check echocardiogram today.  Trial of IV Lasix.  Active Problems:    Essential (primary) hypertension    Assessment: Blood pressure has been relatively well controlled.    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  A1c was 7.4.    Plan: Increase NovoLog dose per sliding scale    DVT Prophylaxis: Pneumatic Compression Devices  Code Status: No Order    NUZHAT JACKSON    Interval History   Patient is quite sleepy this morning, and states this is her usual after taking her Trileptal.  She does not think that her shortness of breath has improved at all.  No chest pain.  No other associated symptoms.    -Data reviewed today: I reviewed all new labs and imaging results over the last 24 hours. I personally reviewed the chest CT image(s) showing A diffuse nodular pattern consistent with bronchopneumonia.  The report was reviewed by me, but not the actual images..  White blood count is down from yesterday.  Other labs remained stable.  Blood sugar is elevated.    Physical Exam   Temp:  98.9  F (37.2  C) Temp src: Temporal BP: 134/76   Heart Rate: 90 Resp: 24 SpO2: 96 % O2 Device: Nasal cannula with humidification Oxygen Delivery: 4 LPM  Vitals:    01/23/19 0020 01/23/19 0458 01/24/19 0300   Weight: 85.5 kg (188 lb 7.9 oz) 85.6 kg (188 lb 11.4 oz) 88.7 kg (195 lb 9.6 oz)     Vital Signs with Ranges  Temp:  [97.2  F (36.2  C)-99.7  F (37.6  C)] 98.9  F (37.2  C)  Heart Rate:  [] 90  Resp:  [20-24] 24  BP: (130-145)/(65-81) 134/76  SpO2:  [88 %-96 %] 96 %  I/O last 3 completed shifts:  In: 2818 [P.O.:1880; I.V.:938]  Out: 1700 [Urine:1700]    Constitutional: Quite sleepy, but easily arousable and in no apparent distress  Respiratory: Diffuse scattered rales.  Rales are heard both anteriorly and posteriorly   Cardiovascular: Distant tones but regular rhythm with no murmur gallop appreciated  GI: Abdomen is soft and nontender   Skin/Integumen: Warm and dry, no edema  Other: Neurologically intact    Medications       amLODIPine  5 mg Oral Daily     aspirin  81 mg Oral Daily     atorvastatin  10 mg Oral At Bedtime     azithromycin  500 mg Intravenous Q24H     budesonide  1 mg Nebulization BID     calcium carbonate-vitamin D  1 tablet Oral BID     cefTRIAXone  1 g Intravenous Once     cefTRIAXone  1 g Intravenous Q24H     enoxaparin  40 mg Subcutaneous Q24H     folic acid  1 mg Oral Daily     glipiZIDE  2.5 mg Oral Daily with breakfast     insulin aspart  1-3 Units Subcutaneous TID AC     insulin aspart  1-3 Units Subcutaneous At Bedtime     ipratropium - albuterol 0.5 mg/2.5 mg/3 mL  3 mL Nebulization 4x daily     lacosamide  150 mg Oral BID    And     lacosamide  50 mg Oral BID     losartan  100 mg Oral Daily     metFORMIN  1,000 mg Oral Daily with breakfast     metFORMIN  500 mg Oral Daily with supper     montelukast  10 mg Oral At Bedtime     OXcarbazepine  1,200 mg Oral BID     potassium chloride ER  20 mEq Oral BID w/meals     predniSONE  20 mg Oral Daily     sodium chloride  4 g Oral TID  w/meals     vitamin C  500 mg Oral BID     vitamin E  400 Units Oral BID       Data   Recent Labs   Lab 01/24/19  0454 01/22/19  2245   WBC 9.4 13.1*   HGB 10.7* 11.8   * 102*    188    136   POTASSIUM 4.0 4.0   CHLORIDE 97* 101   CO2 31 25   BUN 9 10   CR 0.43* 0.44*   ANIONGAP 9 10   OSCAR 9.2 9.3   * 182*   ALBUMIN  --  3.8   PROTTOTAL  --  7.0   BILITOTAL  --  0.4   ALKPHOS  --  48   ALT  --  12   AST  --  12*   TROPI  --  <0.030       Recent Results (from the past 24 hour(s))   CT Chest pulmonary embolism w contrast    Narrative    PROCEDURE:  CT CHEST PULMONARY EMBOLISM W CONTRAST.    HISTORY:  Evaluate for pulmonary embolism.  PE suspected, intermediate  prob, positive D-dimer    TECHNIQUE:  Initial pre-contrast  and localizer images were  obtained.  Contrast enhanced helical CT pulmonary angiography was then  performed.  Routine transaxial and post-processed (multiplanar and/or  MIP) reformations were obtained.    COMPARISON:  Chest radiographs 1/22/2019.    MEDS/CONTRAST: Omnipaque 350, 100 mL IV    PULMONARY CTA FINDINGS:  This is a diagnostic quality helical CT  pulmonary angiogram.  There is no acute pulmonary embolism to the  first subsegmental pulmonary artery level.    OTHER FINDINGS:      Heart size is within normal limits. Coronary calcifications are seen.  The main pulmonary artery is borderline enlarged. The thoracic aorta  is within normal limits in size. Borderline enlarged mediastinal and  hilar nodes are present, potentially reactive.    Limited views of the upper abdomen reveal no adrenal mass or acute  process.     No pleural effusion is present. The central airways are patent. There  is elevation of the right hemidiaphragm, chronic. Diffuse nodular and  groundglass opacities are present throughout the lungs. Some of these  have a branching tree-in-bud pattern.    Focal gibbus deformity at the thoracolumbar junction. The thoracic  kyphosis is straightened.       Impression    IMPRESSION:    No acute pulmonary emboli.    Diffuse nodular and groundglass opacities concerning for disseminated  most acute bronchopneumonia, reflecting the findings seen on the prior  radiograph. Follow-up to resolution is requested.    WANDER ZHANG MD

## 2019-01-24 NOTE — PLAN OF CARE
Patient is maintaining O2 sats of 92-94% on 4 LPM via nasal cannula. O2 use is not chronic. Lungs sounds are diminished throughout and crackles can be auscultated in the bases. Congested cough noted. PRN acetaminophen administered for headache, see MAR. Patient ambulates in room with stand-by assist. Patient up in chair at this time. Will continue to monitor.

## 2019-01-24 NOTE — PLAN OF CARE
Adult Inpatient Plan of Care  Plan of Care Review  1/23/2019 1855 by Anamaria Aranda, RN  Flowsheets  Taken 1/23/2019 1600   Plan of Care Reviewed With  patient  Note  Patient has maintained Sp02 >90% on 5L throughout the rest of the afternoon.  Up to chair for meals, up to bathroom.  Patient has denied any pain or discomfort.  Adequate intake and output.  Patient using IS independently.  Lung sounds continue to be diminished with crackles in bases bilaterally.

## 2019-01-25 ENCOUNTER — APPOINTMENT (OUTPATIENT)
Dept: OCCUPATIONAL THERAPY | Facility: OTHER | Age: 64
End: 2019-01-25
Attending: FAMILY MEDICINE
Payer: COMMERCIAL

## 2019-01-25 ENCOUNTER — APPOINTMENT (OUTPATIENT)
Dept: PHYSICAL THERAPY | Facility: OTHER | Age: 64
End: 2019-01-25
Attending: FAMILY MEDICINE
Payer: COMMERCIAL

## 2019-01-25 LAB
ANION GAP SERPL CALCULATED.3IONS-SCNC: 7 MMOL/L (ref 3–14)
BUN SERPL-MCNC: 9 MG/DL (ref 7–25)
CALCIUM SERPL-MCNC: 9.1 MG/DL (ref 8.6–10.3)
CHLORIDE SERPL-SCNC: 93 MMOL/L (ref 98–107)
CO2 SERPL-SCNC: 36 MMOL/L (ref 21–31)
CREAT SERPL-MCNC: 0.45 MG/DL (ref 0.6–1.2)
ERYTHROCYTE [DISTWIDTH] IN BLOOD BY AUTOMATED COUNT: 14.1 % (ref 10–15)
GFR SERPL CREATININE-BSD FRML MDRD: >90 ML/MIN/{1.73_M2}
GLUCOSE SERPL-MCNC: 194 MG/DL (ref 70–105)
HCT VFR BLD AUTO: 33.3 % (ref 35–47)
HGB BLD-MCNC: 10.8 G/DL (ref 11.7–15.7)
MCH RBC QN AUTO: 33.3 PG (ref 26.5–33)
MCHC RBC AUTO-ENTMCNC: 32.4 G/DL (ref 31.5–36.5)
MCV RBC AUTO: 103 FL (ref 78–100)
PLATELET # BLD AUTO: 193 10E9/L (ref 150–450)
POTASSIUM SERPL-SCNC: 3.7 MMOL/L (ref 3.5–5.1)
RBC # BLD AUTO: 3.24 10E12/L (ref 3.8–5.2)
SODIUM SERPL-SCNC: 136 MMOL/L (ref 134–144)
WBC # BLD AUTO: 8.1 10E9/L (ref 4–11)

## 2019-01-25 PROCEDURE — 94640 AIRWAY INHALATION TREATMENT: CPT

## 2019-01-25 PROCEDURE — 25000128 H RX IP 250 OP 636: Performed by: INTERNAL MEDICINE

## 2019-01-25 PROCEDURE — 25000125 ZZHC RX 250: Performed by: FAMILY MEDICINE

## 2019-01-25 PROCEDURE — 97530 THERAPEUTIC ACTIVITIES: CPT | Mod: GO | Performed by: OCCUPATIONAL THERAPIST

## 2019-01-25 PROCEDURE — 25000128 H RX IP 250 OP 636: Performed by: FAMILY MEDICINE

## 2019-01-25 PROCEDURE — 40000275 ZZH STATISTIC RCP TIME EA 10 MIN

## 2019-01-25 PROCEDURE — 80048 BASIC METABOLIC PNL TOTAL CA: CPT | Performed by: FAMILY MEDICINE

## 2019-01-25 PROCEDURE — 12000000 ZZH R&B MED SURG/OB

## 2019-01-25 PROCEDURE — 25000132 ZZH RX MED GY IP 250 OP 250 PS 637: Performed by: STUDENT IN AN ORGANIZED HEALTH CARE EDUCATION/TRAINING PROGRAM

## 2019-01-25 PROCEDURE — 25000132 ZZH RX MED GY IP 250 OP 250 PS 637: Performed by: FAMILY MEDICINE

## 2019-01-25 PROCEDURE — 94640 AIRWAY INHALATION TREATMENT: CPT | Mod: 76

## 2019-01-25 PROCEDURE — 94618 PULMONARY STRESS TESTING: CPT

## 2019-01-25 PROCEDURE — 25000132 ZZH RX MED GY IP 250 OP 250 PS 637: Performed by: INTERNAL MEDICINE

## 2019-01-25 PROCEDURE — 40000133 ZZH STATISTIC OT WARD VISIT: Performed by: OCCUPATIONAL THERAPIST

## 2019-01-25 PROCEDURE — 36415 COLL VENOUS BLD VENIPUNCTURE: CPT | Performed by: FAMILY MEDICINE

## 2019-01-25 PROCEDURE — 85027 COMPLETE CBC AUTOMATED: CPT | Performed by: FAMILY MEDICINE

## 2019-01-25 PROCEDURE — 97165 OT EVAL LOW COMPLEX 30 MIN: CPT | Mod: GO | Performed by: OCCUPATIONAL THERAPIST

## 2019-01-25 PROCEDURE — 99232 SBSQ HOSP IP/OBS MODERATE 35: CPT | Performed by: INTERNAL MEDICINE

## 2019-01-25 PROCEDURE — 94799 UNLISTED PULMONARY SVC/PX: CPT

## 2019-01-25 RX ORDER — CEFUROXIME AXETIL 250 MG/1
500 TABLET ORAL EVERY 12 HOURS SCHEDULED
Status: DISCONTINUED | OUTPATIENT
Start: 2019-01-25 | End: 2019-01-27 | Stop reason: HOSPADM

## 2019-01-25 RX ORDER — FUROSEMIDE 10 MG/ML
40 INJECTION INTRAMUSCULAR; INTRAVENOUS ONCE
Status: COMPLETED | OUTPATIENT
Start: 2019-01-25 | End: 2019-01-25

## 2019-01-25 RX ORDER — AZITHROMYCIN 250 MG/1
250 TABLET, FILM COATED ORAL DAILY
Status: DISCONTINUED | OUTPATIENT
Start: 2019-01-26 | End: 2019-01-27 | Stop reason: HOSPADM

## 2019-01-25 RX ADMIN — LACOSAMIDE 200 MG: 200 TABLET, FILM COATED ORAL at 20:58

## 2019-01-25 RX ADMIN — SODIUM CHLORIDE TAB 1 GM 4 G: 1 TAB at 08:24

## 2019-01-25 RX ADMIN — LOSARTAN POTASSIUM 100 MG: 50 TABLET, FILM COATED ORAL at 10:47

## 2019-01-25 RX ADMIN — ASPIRIN 81 MG 81 MG: 81 TABLET ORAL at 08:21

## 2019-01-25 RX ADMIN — SODIUM CHLORIDE TAB 1 GM 4 G: 1 TAB at 17:24

## 2019-01-25 RX ADMIN — POTASSIUM CHLORIDE 20 MEQ: 1500 TABLET, EXTENDED RELEASE ORAL at 08:22

## 2019-01-25 RX ADMIN — IPRATROPIUM BROMIDE AND ALBUTEROL SULFATE 3 ML: .5; 3 SOLUTION RESPIRATORY (INHALATION) at 06:26

## 2019-01-25 RX ADMIN — INSULIN ASPART 2 UNITS: 100 INJECTION, SOLUTION INTRAVENOUS; SUBCUTANEOUS at 08:35

## 2019-01-25 RX ADMIN — FUROSEMIDE 40 MG: 10 INJECTION, SOLUTION INTRAMUSCULAR; INTRAVENOUS at 13:11

## 2019-01-25 RX ADMIN — BUDESONIDE 1 MG: 0.5 SUSPENSION RESPIRATORY (INHALATION) at 19:21

## 2019-01-25 RX ADMIN — LACOSAMIDE 200 MG: 200 TABLET, FILM COATED ORAL at 08:23

## 2019-01-25 RX ADMIN — ENOXAPARIN SODIUM 40 MG: 40 INJECTION SUBCUTANEOUS at 10:48

## 2019-01-25 RX ADMIN — METFORMIN HYDROCHLORIDE 500 MG: 500 TABLET, FILM COATED ORAL at 17:22

## 2019-01-25 RX ADMIN — Medication 2.5 MG: at 08:34

## 2019-01-25 RX ADMIN — METFORMIN HYDROCHLORIDE 1000 MG: 1000 TABLET, FILM COATED ORAL at 08:21

## 2019-01-25 RX ADMIN — VITAMIN E CAP 400 UNIT 400 UNITS: 400 CAP at 10:46

## 2019-01-25 RX ADMIN — SODIUM CHLORIDE TAB 1 GM 4 G: 1 TAB at 12:10

## 2019-01-25 RX ADMIN — BUDESONIDE 1 MG: 0.5 SUSPENSION RESPIRATORY (INHALATION) at 06:25

## 2019-01-25 RX ADMIN — CALCIUM CARBONATE-VITAMIN D TAB 500 MG-200 UNIT 1 TABLET: 500-200 TAB at 22:35

## 2019-01-25 RX ADMIN — OXCARBAZEPINE 1200 MG: 300 TABLET ORAL at 22:35

## 2019-01-25 RX ADMIN — OXYCODONE HYDROCHLORIDE AND ACETAMINOPHEN 500 MG: 500 TABLET ORAL at 22:35

## 2019-01-25 RX ADMIN — POTASSIUM CHLORIDE 20 MEQ: 1500 TABLET, EXTENDED RELEASE ORAL at 12:09

## 2019-01-25 RX ADMIN — MONTELUKAST SODIUM 10 MG: 5 TABLET, CHEWABLE ORAL at 22:35

## 2019-01-25 RX ADMIN — IPRATROPIUM BROMIDE AND ALBUTEROL SULFATE 3 ML: .5; 3 SOLUTION RESPIRATORY (INHALATION) at 19:21

## 2019-01-25 RX ADMIN — INSULIN ASPART 3 UNITS: 100 INJECTION, SOLUTION INTRAVENOUS; SUBCUTANEOUS at 12:09

## 2019-01-25 RX ADMIN — CEFUROXIME AXETIL 500 MG: 250 TABLET ORAL at 22:35

## 2019-01-25 RX ADMIN — PREDNISONE 20 MG: 20 TABLET ORAL at 08:21

## 2019-01-25 RX ADMIN — IPRATROPIUM BROMIDE AND ALBUTEROL SULFATE 3 ML: .5; 3 SOLUTION RESPIRATORY (INHALATION) at 10:06

## 2019-01-25 RX ADMIN — OXYCODONE HYDROCHLORIDE AND ACETAMINOPHEN 500 MG: 500 TABLET ORAL at 10:47

## 2019-01-25 RX ADMIN — OXCARBAZEPINE 1200 MG: 300 TABLET ORAL at 10:47

## 2019-01-25 RX ADMIN — AMLODIPINE BESYLATE 5 MG: 5 TABLET ORAL at 10:47

## 2019-01-25 RX ADMIN — ATORVASTATIN CALCIUM 10 MG: 10 TABLET, FILM COATED ORAL at 22:35

## 2019-01-25 RX ADMIN — VITAMIN E CAP 400 UNIT 400 UNITS: 400 CAP at 22:35

## 2019-01-25 RX ADMIN — INSULIN ASPART 2 UNITS: 100 INJECTION, SOLUTION INTRAVENOUS; SUBCUTANEOUS at 17:22

## 2019-01-25 RX ADMIN — IPRATROPIUM BROMIDE AND ALBUTEROL SULFATE 3 ML: .5; 3 SOLUTION RESPIRATORY (INHALATION) at 14:48

## 2019-01-25 RX ADMIN — CALCIUM CARBONATE-VITAMIN D TAB 500 MG-200 UNIT 1 TABLET: 500-200 TAB at 08:21

## 2019-01-25 RX ADMIN — ACETAMINOPHEN 650 MG: 325 TABLET, FILM COATED ORAL at 10:48

## 2019-01-25 RX ADMIN — ACETAMINOPHEN 650 MG: 325 TABLET, FILM COATED ORAL at 22:48

## 2019-01-25 RX ADMIN — AZITHROMYCIN MONOHYDRATE 500 MG: 500 INJECTION, POWDER, LYOPHILIZED, FOR SOLUTION INTRAVENOUS at 10:46

## 2019-01-25 RX ADMIN — FOLIC ACID 1 MG: 1 TABLET ORAL at 10:48

## 2019-01-25 ASSESSMENT — ACTIVITIES OF DAILY LIVING (ADL)
ADLS_ACUITY_SCORE: 24
ADLS_ACUITY_SCORE: 16
ADLS_ACUITY_SCORE: 24
ADLS_ACUITY_SCORE: 24
ADLS_ACUITY_SCORE: 16
ADLS_ACUITY_SCORE: 24

## 2019-01-25 NOTE — PLAN OF CARE
Discharge Planner PT   Patient plan for discharge: Pt wants to return home with  and additional O2.  Current status: Pt demonstrates increased oxygen needs as she desats with activity into mid-80s. Pt sats in low 90s at rest with 3 L/min. O2 increased to 5 L/min during activity and sats reached 90%. Pt did have minor loss of balance when ambulating but able to catch herself using stepping strategy. Crossover gait pattern noted.   Barriers to return to prior living situation: balance, O2 needs  Recommendations for discharge: discharge home with  assist and home oxygen.  Rationale for recommendations: see above.       Entered by: Abdirahman Gordillo 01/25/2019 3:16 PM

## 2019-01-25 NOTE — PROGRESS NOTES
01/25/19 1400   Quick Adds   Type of Visit Initial PT Evaluation   Living Environment   Lives With spouse   Living Arrangements house   Home Accessibility stairs to enter home   Living Environment Comment Single level home; 3 stairs to enter with handrail, low threshold shower with grab bars. Handicap accessible toilet.    Self-Care   Usual Activity Tolerance good   Current Activity Tolerance good   Equipment Currently Used at Home none   Activity/Exercise/Self-Care Comment Pt states she is independent with all activities prior to hospital stay.    Functional Level Prior   Ambulation 0-->independent  (Does occasionally use a cane when it is icy.)   Transferring 0-->independent   Toileting 0-->independent   Bathing 0-->independent   Communication 0-->understands/communicates without difficulty   Swallowing 0-->swallows foods/liquids without difficulty   Cognition 0 - no cognition issues reported   Fall history within last six months no   General Information   Precautions/Limitations fall precautions   Weight-Bearing Status - LLE full weight-bearing   Weight-Bearing Status - RLE full weight-bearing   General Observations AFO use on L LE during ambulation.   General Info Comments Pt notes she feels like her mobility is at her baseline except for a little SOB secondary to pneumonia   Cognitive Status Examination   Orientation orientation to person, place and time   Level of Consciousness alert   Follows Commands and Answers Questions 100% of the time   Personal Safety and Judgment intact   Memory intact   Posture    Posture Forward head position;Kyphosis   Transfer Skills   Transfer Transfer Skill: Sit to Stand   Transfer Skill:  Sit to Stand   Level of [place]: Sit/Stand stand-by assist   Physical Assist/Nonphysical Assist: Sit/Stand supervision;1 person assist   Weightbearing Restrictions: Sit/Stand full weight-bearing   Gait   Gait Gait Skill   Gait Comments Pt ambulated in hallway with RT to test need for  home oxygen. Pt had occassional crossover gait pattern that lead to moments of unsteadiness.    Gait Skills   Level of [place]: Gait contact guard   Physical Assist/Nonphysical Assist: Gait 1 person + 1 person to manage equipment   Weight-Bearing Restrictions: Gait full weight-bearing   Assistive Device for Transfer: Gait other (see comments)  (L AFO)   Gait Distance other (see comment)  (500')   Balance   Balance Comments dynamic balance mildly impaired. One loss of balance with crossover gait pattern.   General Therapy Interventions   Planned Therapy Interventions gait training   Clinical Impression   Criteria for Skilled Therapeutic Intervention yes, treatment indicated   PT Diagnosis decreased activity tolerance   Influenced by the following impairments SOB, endurance   Functional limitations due to impairments Decreased functional mobility   Clinical Presentation Stable/Uncomplicated   Clinical Decision Making (Complexity) Low complexity   Therapy Frequency` daily   Predicted Duration of Therapy Intervention (days/wks) 1-2 days   Anticipated Equipment Needs at Discharge (O2 needs)   Anticipated Discharge Disposition Home   Risk & Benefits of therapy have been explained Yes   Patient, Family & other staff in agreement with plan of care Yes   Total Evaluation Time   Total Evaluation Time (Minutes) 15

## 2019-01-25 NOTE — PLAN OF CARE
Discharge Planner OT   Patient plan for discharge: to return home as previous   Current status: Pt up with CGA, requiring 3 liters of O2 and will likely need at discharge   Barriers to return to prior living situation: new O2, SOB   Recommendations for discharge: assist from family, possible home care, TBA further   Rationale for recommendations: see above        Entered by: Leidy Zapata 01/25/2019 4:50 PM

## 2019-01-25 NOTE — PROGRESS NOTES
01/25/19 1600   Quick Adds   Type of Visit Initial Occupational Therapy Evaluation   Cognitive Status Examination   Orientation orientation to person, place and time   Level of Consciousness alert   Follows Commands (Cognition) WNL   Memory intact   Attention No deficits were identified   Visual Perception   Visual Perception No deficits were identified   Pain Assessment   Patient Currently in Pain No   Range of Motion (ROM)   ROM Quick Adds No deficits were identified   Transfer Skill: Bed to Chair/Chair to Bed   Level of [place]: Bed to Chair contact guard   Physical Assist/Nonphysical Assist: Bed to Chair 1 person assist   Transfer Skill: Sit to Stand   Level of [place]: Sit/Stand contact guard   Physical Assist/Nonphysical Assist: Sit/Stand 1 person assist   Bathing   Level of [place] (completed prior to OT eval, TBA further )   Clinical Impression   Criteria for Skilled Therapeutic Interventions Met yes, treatment indicated   OT Diagnosis functional weakness    Influenced by the following impairments SOB, weakness    Assessment of Occupational Performance 1-3 Performance Deficits   Identified Performance Deficits mobility and self care    Clinical Decision Making (Complexity) Low complexity   Therapy Frequency daily   Predicted Duration of Therapy Intervention (days/wks) 1-2 days    Anticipated Equipment Needs at Discharge (has all necessary equipment)   Anticipated Discharge Disposition Home with Assist   Total Evaluation Time   Total Evaluation Time (Minutes) 15

## 2019-01-25 NOTE — PROGRESS NOTES
Patient has been assessed for Home Oxygen needs. Oxygen readings:    *Pulse oximetry (SpO2) = 87% on room air at rest while awake.    *SpO2 improved to 90% on 2liters/minute at rest.      *SpO2 improved to 90% on 5iters/minute during activity/with exercise.    RT Tomas on 1/25/2019 at 3:34 PM

## 2019-01-25 NOTE — PLAN OF CARE
Patient maintaining O2 sats of 90-91% on 3 LPM via nasal cannula. Patient desatted to 64% on home CPAP without O2. Patient recovered quickly when supplemental O2 was bled in and tolerated home CPAP with 3 LPM O2 while sleeping. Crackles auscultated in lower lobes bilaterally and frequent congested cough observed. Patient up in chair at this time and resting comfortably.

## 2019-01-25 NOTE — PROGRESS NOTES
[facility]    Hospitalist Progress Note      Assessment & Plan   Alicia Becker is a 63 year old female who was admitted on 1/22/2019.     Principal Problem:    Community acquired bacterial pneumonia, unspecified laterality    Assessment: Slow improvement. Chest CT shows bilateral pneumonia. No significant fevers, normalization of white blood cell count.     Plan: Switch to oral azithromycin and ceftin.       Pulmonary hypertension (H)    Assessment: diagnosed on echo. Weight up, but intake and output shows net negative. Unclear.    Plan: Lasix 40 mg x1 today. Will need oxygen on discharge.      Essential (primary) hypertension    Assessment: chronic, somewhat controlled    Plan: monitor      Generalized seizure (H)    Assessment: none in hospital    Plan: continue seizure meds      Hypoxia    Assessment: multifactorial, pneumonia plus pulmonary hypertension.     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 No fevers, chills. No pain.     -Data reviewed today: I reviewed all new labs and imaging results over the last 24 hours. I personally reviewed no images or EKG's today.    Physical Exam   Temp: 100.5  F (38.1  C) Temp src: Tympanic BP: 162/73 Pulse: 99 Heart Rate: 109 Resp: 20 SpO2: (!) 89 % O2 Device: Nasal cannula Oxygen Delivery: 3 LPM  Vitals:    01/23/19 0020 01/23/19 0458 01/24/19 0300   Weight: 85.5 kg (188 lb 7.9 oz) 85.6 kg (188 lb 11.4 oz) 88.7 kg (195 lb 9.6 oz)     Vital Signs with Ranges  Temp:  [98.1  F (36.7  C)-100.5  F (38.1  C)] 100.5  F (38.1  C)  Pulse:  []  99  Heart Rate:  [] 109  Resp:  [20] 20  BP: (136-162)/(71-77) 162/73  SpO2:  [89 %-96 %] 89 %  I/O last 3 completed shifts:  In: 644 [P.O.:200; I.V.:444]  Out: 3450 [Urine:3450]    GENERAL: Comfortable, no apparent distress.  CARDIOVASCULAR: regular rate and rhythm, no murmur. No lower extremity edema   RESPIRATORY: Bilateral crackles.  GI: non-tender, non-distended, normal bowel sounds.   SKIN: warm periphery, no rashes      Medications       amLODIPine  5 mg Oral Daily     aspirin  81 mg Oral Daily     atorvastatin  10 mg Oral At Bedtime     [START ON 1/26/2019] azithromycin  250 mg Oral Daily     budesonide  1 mg Nebulization BID     calcium carbonate-vitamin D  1 tablet Oral BID     cefTRIAXone  1 g Intravenous Once     cefuroxime  500 mg Oral Q12H HILTON     enoxaparin  40 mg Subcutaneous Q24H     folic acid  1 mg Oral Daily     furosemide  40 mg Intravenous Once     glipiZIDE  2.5 mg Oral Daily with breakfast     insulin aspart  1-3 Units Subcutaneous TID AC     insulin aspart  1-3 Units Subcutaneous At Bedtime     ipratropium - albuterol 0.5 mg/2.5 mg/3 mL  3 mL Nebulization 4x daily     lacosamide  200 mg Oral BID     losartan  100 mg Oral Daily     metFORMIN  1,000 mg Oral Daily with breakfast     metFORMIN  500 mg Oral Daily with supper     montelukast  10 mg Oral At Bedtime     OXcarbazepine  1,200 mg Oral BID     potassium chloride ER  20 mEq Oral BID w/meals     predniSONE  20 mg Oral Daily     sodium chloride  4 g Oral TID w/meals     vitamin C  500 mg Oral BID     vitamin E  400 Units Oral BID       Data   Recent Labs   Lab 01/25/19  0440 01/24/19  0454 01/22/19  2245   WBC 8.1 9.4 13.1*   HGB 10.8* 10.7* 11.8   * 103* 102*    184 188    137 136   POTASSIUM 3.7 4.0 4.0   CHLORIDE 93* 97* 101   CO2 36* 31 25   BUN 9 9 10   CR 0.45* 0.43* 0.44*   ANIONGAP 7 9 10   OSCAR 9.1 9.2 9.3   * 195* 182*   ALBUMIN  --   --  3.8   PROTTOTAL  --   --  7.0   BILITOTAL  --   --  0.4    ALKPHOS  --   --  48   ALT  --   --  12   AST  --   --  12*   TROPI  --   --  <0.030

## 2019-01-25 NOTE — PLAN OF CARE
Pt VS Temp: 98.5  F (36.9  C) Temp src: Tympanic BP: 142/70 Pulse: 99 Heart Rate: 102 Resp: 20 SpO2: (!) 89 % O2 Device: Nasal cannula Oxygen Delivery: 2 LPM      Continues to be on O2 throughout the shift. Complains of no pain. Upon assessment, lungs have crackles throughout the lungs and heart tachycardic. Skin intact ex slight trace +1 edema in the feet and ankles. Bowel sounds active. Has been ambulating to the bathroom with SBA with a current output of 3100 for this shift. Blood sugars have been 214, 240, and 225. Will follow-up as appropriate. Graciela Vaca RN on 1/25/2019 at 5:56 PM

## 2019-01-25 NOTE — PROGRESS NOTES
Called and spoke to [facility] and they will start the paperwork once the order for home oxygen is in patients chart.    Fadia Lamb RT on 1/25/2019 at 4:14 PM

## 2019-01-25 NOTE — PROGRESS NOTES
RN called stating pt desating on her home CPAP. 3L bleed in added with pt SpO2 maintaining above 92%. Marysol Larsen RRT

## 2019-01-26 ENCOUNTER — APPOINTMENT (OUTPATIENT)
Dept: PHYSICAL THERAPY | Facility: OTHER | Age: 64
End: 2019-01-26
Attending: FAMILY MEDICINE
Payer: COMMERCIAL

## 2019-01-26 ENCOUNTER — APPOINTMENT (OUTPATIENT)
Dept: OCCUPATIONAL THERAPY | Facility: OTHER | Age: 64
End: 2019-01-26
Attending: FAMILY MEDICINE
Payer: COMMERCIAL

## 2019-01-26 PROCEDURE — 97530 THERAPEUTIC ACTIVITIES: CPT | Mod: GP

## 2019-01-26 PROCEDURE — 99232 SBSQ HOSP IP/OBS MODERATE 35: CPT | Performed by: INTERNAL MEDICINE

## 2019-01-26 PROCEDURE — 40000193 ZZH STATISTIC PT WARD VISIT

## 2019-01-26 PROCEDURE — 40000275 ZZH STATISTIC RCP TIME EA 10 MIN

## 2019-01-26 PROCEDURE — 12000000 ZZH R&B MED SURG/OB

## 2019-01-26 PROCEDURE — 94640 AIRWAY INHALATION TREATMENT: CPT

## 2019-01-26 PROCEDURE — 25000132 ZZH RX MED GY IP 250 OP 250 PS 637: Performed by: FAMILY MEDICINE

## 2019-01-26 PROCEDURE — 94799 UNLISTED PULMONARY SVC/PX: CPT

## 2019-01-26 PROCEDURE — 40000133 ZZH STATISTIC OT WARD VISIT

## 2019-01-26 PROCEDURE — 25000128 H RX IP 250 OP 636: Performed by: FAMILY MEDICINE

## 2019-01-26 PROCEDURE — 25000125 ZZHC RX 250: Performed by: FAMILY MEDICINE

## 2019-01-26 PROCEDURE — 97530 THERAPEUTIC ACTIVITIES: CPT | Mod: GO

## 2019-01-26 PROCEDURE — 94640 AIRWAY INHALATION TREATMENT: CPT | Mod: 76

## 2019-01-26 PROCEDURE — 25000132 ZZH RX MED GY IP 250 OP 250 PS 637: Performed by: INTERNAL MEDICINE

## 2019-01-26 PROCEDURE — 25000132 ZZH RX MED GY IP 250 OP 250 PS 637: Performed by: STUDENT IN AN ORGANIZED HEALTH CARE EDUCATION/TRAINING PROGRAM

## 2019-01-26 RX ORDER — FUROSEMIDE 20 MG
20 TABLET ORAL DAILY
Status: DISCONTINUED | OUTPATIENT
Start: 2019-01-26 | End: 2019-01-27 | Stop reason: HOSPADM

## 2019-01-26 RX ADMIN — FOLIC ACID 1 MG: 1 TABLET ORAL at 10:18

## 2019-01-26 RX ADMIN — ALBUTEROL SULFATE 2.5 MG: 2.5 SOLUTION RESPIRATORY (INHALATION) at 04:08

## 2019-01-26 RX ADMIN — MONTELUKAST SODIUM 10 MG: 5 TABLET, CHEWABLE ORAL at 21:55

## 2019-01-26 RX ADMIN — LACOSAMIDE 200 MG: 200 TABLET, FILM COATED ORAL at 08:56

## 2019-01-26 RX ADMIN — IPRATROPIUM BROMIDE AND ALBUTEROL SULFATE 3 ML: .5; 3 SOLUTION RESPIRATORY (INHALATION) at 15:39

## 2019-01-26 RX ADMIN — IPRATROPIUM BROMIDE AND ALBUTEROL SULFATE 3 ML: .5; 3 SOLUTION RESPIRATORY (INHALATION) at 18:05

## 2019-01-26 RX ADMIN — ATORVASTATIN CALCIUM 10 MG: 10 TABLET, FILM COATED ORAL at 21:55

## 2019-01-26 RX ADMIN — CEFUROXIME AXETIL 500 MG: 250 TABLET ORAL at 21:55

## 2019-01-26 RX ADMIN — CEFUROXIME AXETIL 500 MG: 250 TABLET ORAL at 10:17

## 2019-01-26 RX ADMIN — ASPIRIN 81 MG 81 MG: 81 TABLET ORAL at 08:35

## 2019-01-26 RX ADMIN — OXCARBAZEPINE 1200 MG: 300 TABLET ORAL at 10:18

## 2019-01-26 RX ADMIN — SODIUM CHLORIDE TAB 1 GM 4 G: 1 TAB at 17:01

## 2019-01-26 RX ADMIN — PREDNISONE 20 MG: 20 TABLET ORAL at 08:35

## 2019-01-26 RX ADMIN — POTASSIUM CHLORIDE 20 MEQ: 1500 TABLET, EXTENDED RELEASE ORAL at 11:47

## 2019-01-26 RX ADMIN — IPRATROPIUM BROMIDE AND ALBUTEROL SULFATE 3 ML: .5; 3 SOLUTION RESPIRATORY (INHALATION) at 06:21

## 2019-01-26 RX ADMIN — AZITHROMYCIN 250 MG: 250 TABLET, FILM COATED ORAL at 10:18

## 2019-01-26 RX ADMIN — ENOXAPARIN SODIUM 40 MG: 40 INJECTION SUBCUTANEOUS at 10:18

## 2019-01-26 RX ADMIN — INSULIN ASPART 3 UNITS: 100 INJECTION, SOLUTION INTRAVENOUS; SUBCUTANEOUS at 17:01

## 2019-01-26 RX ADMIN — ACETAMINOPHEN 650 MG: 325 TABLET, FILM COATED ORAL at 22:04

## 2019-01-26 RX ADMIN — POTASSIUM CHLORIDE 20 MEQ: 1500 TABLET, EXTENDED RELEASE ORAL at 08:35

## 2019-01-26 RX ADMIN — BUDESONIDE 1 MG: 0.5 SUSPENSION RESPIRATORY (INHALATION) at 06:21

## 2019-01-26 RX ADMIN — BUDESONIDE 1 MG: 0.5 SUSPENSION RESPIRATORY (INHALATION) at 18:05

## 2019-01-26 RX ADMIN — CALCIUM CARBONATE-VITAMIN D TAB 500 MG-200 UNIT 1 TABLET: 500-200 TAB at 21:55

## 2019-01-26 RX ADMIN — CALCIUM CARBONATE-VITAMIN D TAB 500 MG-200 UNIT 1 TABLET: 500-200 TAB at 10:17

## 2019-01-26 RX ADMIN — AMLODIPINE BESYLATE 5 MG: 5 TABLET ORAL at 10:17

## 2019-01-26 RX ADMIN — VITAMIN E CAP 400 UNIT 400 UNITS: 400 CAP at 21:55

## 2019-01-26 RX ADMIN — METFORMIN HYDROCHLORIDE 1000 MG: 1000 TABLET, FILM COATED ORAL at 08:36

## 2019-01-26 RX ADMIN — LACOSAMIDE 200 MG: 200 TABLET, FILM COATED ORAL at 18:46

## 2019-01-26 RX ADMIN — ACETAMINOPHEN 650 MG: 325 TABLET, FILM COATED ORAL at 08:34

## 2019-01-26 RX ADMIN — INSULIN ASPART 2 UNITS: 100 INJECTION, SOLUTION INTRAVENOUS; SUBCUTANEOUS at 08:36

## 2019-01-26 RX ADMIN — SODIUM CHLORIDE TAB 1 GM 4 G: 1 TAB at 08:36

## 2019-01-26 RX ADMIN — FUROSEMIDE 20 MG: 20 TABLET ORAL at 12:20

## 2019-01-26 RX ADMIN — IPRATROPIUM BROMIDE AND ALBUTEROL SULFATE 3 ML: .5; 3 SOLUTION RESPIRATORY (INHALATION) at 10:02

## 2019-01-26 RX ADMIN — OXCARBAZEPINE 1200 MG: 300 TABLET ORAL at 21:55

## 2019-01-26 RX ADMIN — LOSARTAN POTASSIUM 100 MG: 50 TABLET, FILM COATED ORAL at 10:17

## 2019-01-26 RX ADMIN — VITAMIN E CAP 400 UNIT 400 UNITS: 400 CAP at 10:19

## 2019-01-26 RX ADMIN — METFORMIN HYDROCHLORIDE 500 MG: 500 TABLET, FILM COATED ORAL at 17:00

## 2019-01-26 RX ADMIN — Medication 2.5 MG: at 08:34

## 2019-01-26 RX ADMIN — OXYCODONE HYDROCHLORIDE AND ACETAMINOPHEN 500 MG: 500 TABLET ORAL at 10:19

## 2019-01-26 RX ADMIN — INSULIN ASPART 3 UNITS: 100 INJECTION, SOLUTION INTRAVENOUS; SUBCUTANEOUS at 11:51

## 2019-01-26 RX ADMIN — OXYCODONE HYDROCHLORIDE AND ACETAMINOPHEN 500 MG: 500 TABLET ORAL at 21:55

## 2019-01-26 RX ADMIN — SODIUM CHLORIDE TAB 1 GM 4 G: 1 TAB at 11:49

## 2019-01-26 ASSESSMENT — ACTIVITIES OF DAILY LIVING (ADL)
ADLS_ACUITY_SCORE: 16
ADLS_ACUITY_SCORE: 16
ADLS_ACUITY_SCORE: 15
ADLS_ACUITY_SCORE: 15
ADLS_ACUITY_SCORE: 16
ADLS_ACUITY_SCORE: 16

## 2019-01-26 NOTE — PLAN OF CARE
Discharge Planner PT   Patient plan for discharge: to return home  Current status: Patient required SBA-CGA for ambulation from recliner to the bathroom. Feels steady on feet and denies dizziness.   Barriers to return to prior living situation: SOB, illness  Recommendations for discharge: To return to previous living condition. No further physical therapy appear needed at this time. Will continue to monitor and assess  Rationale for recommendations: see above       Entered by: Umesh Gordillo 01/26/2019 11:56 AM

## 2019-01-26 NOTE — PLAN OF CARE
Discharge Planner OT   Patient plan for discharge: Patient reports she may be going home later this afternoon.    Current status: SBA with functional ambulation/transfers.  Ambulated to bathroom from recliner. Declined shower/grooming when approached due to just taking meds.  Patient reports she rests for 2 hours after her meds.    Barriers to return to prior living situation: illness   Recommendations for discharge: Home with increased O2 and assist from family/friend as needed.   Rationale for recommendations: See above       Entered by: Taya Jimenez 01/26/2019 11:37 AM

## 2019-01-26 NOTE — PROGRESS NOTES
Called and left message with on call home 02 therapist about patient going home with oxygen tomorrow.    Fadia Lamb, RT on 1/26/2019 at 11:40 AM

## 2019-01-26 NOTE — PROGRESS NOTES
[facility]    Hospitalist Progress Note      Assessment & Plan   Alicia Becker is a 63 year old female who was admitted on 1/22/2019.       Community acquired bacterial pneumonia, unspecified laterality    Assessment: Ongoing improvement. Chest CT shows bilateral pneumonia. No significant fevers, normalization of white blood cell count.     Plan: Switched to oral azithromycin and ceftin.        Pulmonary hypertension (H)    Assessment: diagnosed on echo. Weight up, but intake and output shows net negative. Unclear.    Plan: Lasix 20 mg daily.  Will need oxygen on discharge.       Essential (primary) hypertension    Assessment: chronic, somewhat controlled    Plan: monitor       Generalized seizure (H)    Assessment: none in hospital    Plan: continue seizure meds       Hypoxia    Assessment: multifactorial, pneumonia plus pulmonary hypertension.     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 Still needs 3 liters oxygen but no dyspnea.     -Data reviewed today: I reviewed all new labs and imaging results over the last 24 hours. I personally reviewed no images or EKG's today.    Physical Exam   Temp: 98.5  F (36.9  C) Temp src: Tympanic BP: 151/74   Heart Rate: 102 Resp: 20 SpO2: 91 % O2 Device: Nasal cannula Oxygen Delivery: 3 LPM  Vitals:    01/23/19 0458 01/24/19 0300 01/26/19 0600   Weight: 85.6 kg (188 lb 11.4 oz) 88.7 kg (195 lb 9.6 oz) 82.2 kg (181 lb 3.2 oz)     Vital Signs with Ranges  Temp:  [97.6  F (36.4  C)-99.2  F (37.3  C)] 98.5  F (36.9  C)  Heart Rate:  []  102  Resp:  [20] 20  BP: (134-167)/(64-78) 151/74  SpO2:  [81 %-93 %] 91 %  I/O last 3 completed shifts:  In: 1100 [P.O.:1100]  Out: 3850 [Urine:3850]    GENERAL: Comfortable, no apparent distress.  CARDIOVASCULAR: regular rate and rhythm, no murmur. No lower extremity edema   RESPIRATORY: Clear to auscultation bilaterally, no wheezes or crackles.  GI: non-tender, non-distended, normal bowel sounds.   SKIN: warm periphery, no rashes      Medications       amLODIPine  5 mg Oral Daily     aspirin  81 mg Oral Daily     atorvastatin  10 mg Oral At Bedtime     azithromycin  250 mg Oral Daily     budesonide  1 mg Nebulization BID     calcium carbonate-vitamin D  1 tablet Oral BID     cefTRIAXone  1 g Intravenous Once     cefuroxime  500 mg Oral Q12H HILTON     enoxaparin  40 mg Subcutaneous Q24H     folic acid  1 mg Oral Daily     furosemide  20 mg Oral Daily     glipiZIDE  2.5 mg Oral Daily with breakfast     insulin aspart  1-3 Units Subcutaneous TID AC     insulin aspart  1-3 Units Subcutaneous At Bedtime     ipratropium - albuterol 0.5 mg/2.5 mg/3 mL  3 mL Nebulization 4x daily     lacosamide  200 mg Oral BID     losartan  100 mg Oral Daily     metFORMIN  1,000 mg Oral Daily with breakfast     metFORMIN  500 mg Oral Daily with supper     montelukast  10 mg Oral At Bedtime     OXcarbazepine  1,200 mg Oral BID     potassium chloride ER  20 mEq Oral BID w/meals     predniSONE  20 mg Oral Daily     sodium chloride  4 g Oral TID w/meals     vitamin C  500 mg Oral BID     vitamin E  400 Units Oral BID       Data   Recent Labs   Lab 01/25/19  0440 01/24/19  0454 01/22/19  2245   WBC 8.1 9.4 13.1*   HGB 10.8* 10.7* 11.8   * 103* 102*    184 188    137 136   POTASSIUM 3.7 4.0 4.0   CHLORIDE 93* 97* 101   CO2 36* 31 25   BUN 9 9 10   CR 0.45* 0.43* 0.44*   ANIONGAP 7 9 10   OSCAR 9.1 9.2 9.3   * 195* 182*   ALBUMIN  --   --  3.8   PROTTOTAL  --   --  7.0   BILITOTAL  --   --  0.4   ALKPHOS  --   --  48    ALT  --   --  12   AST  --   --  12*   TROPI  --   --  <0.030

## 2019-01-26 NOTE — PLAN OF CARE
Pt desats with activity. Pt walked to the bathroom with 3.5 liters via NC. O2 dropped to 81%. Writer increased O2 to 5 liters via Cpap. Pt eventually was able to gets sats >90% and O2 decreased to 3 liters via CPAP. Lung Sounds Diminished/coarse. Pt has a harsh, nonproductive cough. Pt is SBA and steady on feet. /78 (BP Location: Left arm)   Pulse 99   Temp 97.6  F (36.4  C) (Tympanic)   Resp 20   Wt 88.7 kg (195 lb 9.6 oz)   SpO2 90%   Breastfeeding? No   BMI 34.65 kg/m

## 2019-01-26 NOTE — PLAN OF CARE
Pt VS Temp: 99.5  F (37.5  C) Temp src: Tympanic BP: 147/70   Heart Rate: 105 Resp: 20 SpO2: 93 % O2 Device: Nasal cannula Oxygen Delivery: 3 LPM      HR tachycardic throughout this shift. A&O X3 but forgetful at times. Alarms remain active and pt is appropriate with the call light. Complained of a headache this AM and was given PRN Tylenol in which pt received relief. Upon assessment, lungs have crackles throughout and the lower lobes diminished. Continues to have a frequent productive-harsh cough with a small amount of yellow phlegm. Skin is intact ex trace +1 edema both ankles and feet. Has been up to the bathroom multiple times this shift with SBA. Was able to walk in the can with 5L O2 with some dyspnea present. Will follow-up as needed. Graciela Vaca RN on 1/26/2019 at 4:18 PM

## 2019-01-26 NOTE — PROGRESS NOTES
Called and left message for [facility] about the home oxygen set up again with no answer.  Called [facility] equipment in [place] hoping for a response about getting patient set up for tomorrow.    Fadia Lamb RT on 1/26/2019 at 2:38 PM

## 2019-01-27 ENCOUNTER — APPOINTMENT (OUTPATIENT)
Dept: OCCUPATIONAL THERAPY | Facility: OTHER | Age: 64
End: 2019-01-27
Attending: FAMILY MEDICINE
Payer: COMMERCIAL

## 2019-01-27 ENCOUNTER — APPOINTMENT (OUTPATIENT)
Dept: PHYSICAL THERAPY | Facility: OTHER | Age: 64
End: 2019-01-27
Attending: FAMILY MEDICINE
Payer: COMMERCIAL

## 2019-01-27 VITALS
BODY MASS INDEX: 32.45 KG/M2 | SYSTOLIC BLOOD PRESSURE: 161 MMHG | RESPIRATION RATE: 26 BRPM | HEART RATE: 99 BPM | WEIGHT: 183.2 LBS | TEMPERATURE: 98.8 F | DIASTOLIC BLOOD PRESSURE: 89 MMHG | OXYGEN SATURATION: 90 %

## 2019-01-27 PROCEDURE — 40000193 ZZH STATISTIC PT WARD VISIT

## 2019-01-27 PROCEDURE — 25000125 ZZHC RX 250: Performed by: FAMILY MEDICINE

## 2019-01-27 PROCEDURE — 25000132 ZZH RX MED GY IP 250 OP 250 PS 637: Performed by: INTERNAL MEDICINE

## 2019-01-27 PROCEDURE — 97530 THERAPEUTIC ACTIVITIES: CPT | Mod: GO

## 2019-01-27 PROCEDURE — 25000132 ZZH RX MED GY IP 250 OP 250 PS 637: Performed by: FAMILY MEDICINE

## 2019-01-27 PROCEDURE — 40000275 ZZH STATISTIC RCP TIME EA 10 MIN

## 2019-01-27 PROCEDURE — 97116 GAIT TRAINING THERAPY: CPT | Mod: GP

## 2019-01-27 PROCEDURE — 40000133 ZZH STATISTIC OT WARD VISIT

## 2019-01-27 PROCEDURE — 94640 AIRWAY INHALATION TREATMENT: CPT

## 2019-01-27 PROCEDURE — 99239 HOSP IP/OBS DSCHRG MGMT >30: CPT | Performed by: INTERNAL MEDICINE

## 2019-01-27 PROCEDURE — 25000128 H RX IP 250 OP 636: Performed by: FAMILY MEDICINE

## 2019-01-27 PROCEDURE — 25000132 ZZH RX MED GY IP 250 OP 250 PS 637: Performed by: STUDENT IN AN ORGANIZED HEALTH CARE EDUCATION/TRAINING PROGRAM

## 2019-01-27 RX ORDER — CEFUROXIME AXETIL 500 MG/1
500 TABLET ORAL EVERY 12 HOURS
Qty: 14 TABLET | Refills: 0 | Status: SHIPPED | OUTPATIENT
Start: 2019-01-27 | End: 2019-02-03

## 2019-01-27 RX ORDER — FUROSEMIDE 20 MG
20 TABLET ORAL DAILY
Qty: 30 TABLET | Refills: 0 | Status: SHIPPED | OUTPATIENT
Start: 2019-01-27 | End: 2021-07-09

## 2019-01-27 RX ORDER — AZITHROMYCIN 250 MG/1
250 TABLET, FILM COATED ORAL DAILY
Qty: 3 TABLET | Refills: 0 | Status: SHIPPED | OUTPATIENT
Start: 2019-01-27 | End: 2019-01-30

## 2019-01-27 RX ORDER — IPRATROPIUM BROMIDE AND ALBUTEROL SULFATE 2.5; .5 MG/3ML; MG/3ML
SOLUTION RESPIRATORY (INHALATION)
Qty: 1 BOX | Refills: 0 | Status: SHIPPED | OUTPATIENT
Start: 2019-01-27

## 2019-01-27 RX ADMIN — SODIUM CHLORIDE TAB 1 GM 4 G: 1 TAB at 08:26

## 2019-01-27 RX ADMIN — ACETAMINOPHEN 650 MG: 325 TABLET, FILM COATED ORAL at 05:57

## 2019-01-27 RX ADMIN — ENOXAPARIN SODIUM 40 MG: 40 INJECTION SUBCUTANEOUS at 10:48

## 2019-01-27 RX ADMIN — SODIUM CHLORIDE TAB 1 GM 4 G: 1 TAB at 11:53

## 2019-01-27 RX ADMIN — CEFUROXIME AXETIL 500 MG: 250 TABLET ORAL at 10:49

## 2019-01-27 RX ADMIN — INSULIN ASPART 2 UNITS: 100 INJECTION, SOLUTION INTRAVENOUS; SUBCUTANEOUS at 08:27

## 2019-01-27 RX ADMIN — Medication 2.5 MG: at 08:27

## 2019-01-27 RX ADMIN — AMLODIPINE BESYLATE 5 MG: 5 TABLET ORAL at 10:49

## 2019-01-27 RX ADMIN — INSULIN ASPART 2 UNITS: 100 INJECTION, SOLUTION INTRAVENOUS; SUBCUTANEOUS at 11:52

## 2019-01-27 RX ADMIN — FOLIC ACID 1 MG: 1 TABLET ORAL at 10:49

## 2019-01-27 RX ADMIN — BUDESONIDE 1 MG: 0.5 SUSPENSION RESPIRATORY (INHALATION) at 07:15

## 2019-01-27 RX ADMIN — CALCIUM CARBONATE-VITAMIN D TAB 500 MG-200 UNIT 1 TABLET: 500-200 TAB at 10:49

## 2019-01-27 RX ADMIN — PREDNISONE 20 MG: 20 TABLET ORAL at 08:26

## 2019-01-27 RX ADMIN — VITAMIN E CAP 400 UNIT 400 UNITS: 400 CAP at 10:48

## 2019-01-27 RX ADMIN — LACOSAMIDE 200 MG: 200 TABLET, FILM COATED ORAL at 09:17

## 2019-01-27 RX ADMIN — OXYCODONE HYDROCHLORIDE AND ACETAMINOPHEN 500 MG: 500 TABLET ORAL at 10:48

## 2019-01-27 RX ADMIN — FUROSEMIDE 20 MG: 20 TABLET ORAL at 10:49

## 2019-01-27 RX ADMIN — ASPIRIN 81 MG 81 MG: 81 TABLET ORAL at 08:26

## 2019-01-27 RX ADMIN — METFORMIN HYDROCHLORIDE 1000 MG: 1000 TABLET, FILM COATED ORAL at 08:26

## 2019-01-27 RX ADMIN — AZITHROMYCIN 250 MG: 250 TABLET, FILM COATED ORAL at 10:49

## 2019-01-27 RX ADMIN — IPRATROPIUM BROMIDE AND ALBUTEROL SULFATE 3 ML: .5; 3 SOLUTION RESPIRATORY (INHALATION) at 07:19

## 2019-01-27 RX ADMIN — OXCARBAZEPINE 1200 MG: 300 TABLET ORAL at 09:17

## 2019-01-27 RX ADMIN — LOSARTAN POTASSIUM 100 MG: 50 TABLET, FILM COATED ORAL at 10:48

## 2019-01-27 RX ADMIN — POTASSIUM CHLORIDE 20 MEQ: 1500 TABLET, EXTENDED RELEASE ORAL at 11:52

## 2019-01-27 RX ADMIN — POTASSIUM CHLORIDE 20 MEQ: 1500 TABLET, EXTENDED RELEASE ORAL at 08:26

## 2019-01-27 ASSESSMENT — ACTIVITIES OF DAILY LIVING (ADL)
ADLS_ACUITY_SCORE: 15

## 2019-01-27 NOTE — PHARMACY - DISCHARGE MEDICATION RECONCILIATION AND EDUCATION
Pharmacy:  Discharge Counseling and Medication Reconciliation    Alicia Becker  [address] [place] [address]  [phone] (home)   63 year old female  PCP: Oriana Casarez    Allergies: Ciprofloxacin; Levofloxacin; Quinolones; Carbamazepine; Levetiracetam; Lisinopril; Niacin; Sulfa drugs; and Valproic acid    Discharge Counseling:    Pharmacist met with patient (and/or family) today to review the medication portion of the After Visit Summary (with an emphasis on NEW medications) and to address patient's questions/concerns.    Summary of Education: Provided education both verbally and in print with patient and  on new medications: Lasix, Zithromax, and cefuroxime. Also recommended holding methotrexate until speaking with Dr. Alonso at [phone] for directions on when to restart.  Also discussed continuing home prednisone dose.    Materials Provided:  MedCounselor sheets printed from [employer] on: Lasix, Zithromax, and cefuroxime.     Discharge Medication Reconciliation:    Jeremias Salinas RPH has reviewed the patient's discharge medication orders and has compared them to the inpatient medication administration record and to what the patient was taking prior to admission - any discrepancies have been resolved.    It has been determined that the patient has an adequate supply of medications available or which can be obtained from the patient's preferred pharmacy, which HE/SHE has confirmed as: [facility]     Thank you for the consult.    Jeremias Salinas RPH........January 27, 2019 1:54 PM

## 2019-01-27 NOTE — DISCHARGE INSTRUCTIONS
Oxygen Provider:  Arranged through [facility], contact number [phone].  If you have any questions or concerns please call the oxygen company directly.

## 2019-01-27 NOTE — PLAN OF CARE
Lung sounds coarse, crackles/diminished posterior.  Pt has a strong, productive cough, coughing up yellow sputum. Pt wore CPAP all night with 3 liters bleeding in, spot check >90%.  /80   Pulse 99   Temp 96.8  F (36  C) (Tympanic)   Resp 24   Wt 83.1 kg (183 lb 3.2 oz)   SpO2 90%   Breastfeeding? No   BMI 32.45 kg/m   Will continue to assess and intervene appropriately.

## 2019-01-27 NOTE — PLAN OF CARE
Discharge Planner OT   Patient plan for discharge: Return home with assist from spouse   Current status: CGA for functional mobility.  Ambulated with CGA 450ft with assist to manage equipment.    Barriers to return to prior living situation: increased need for assistance with ADLs/illness   Recommendations for discharge: Home with assist   Rationale for recommendations: See above        Entered by: Taya Jimenez 01/27/2019 11:06 AM

## 2019-01-27 NOTE — DISCHARGE SUMMARY
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 Per the H&P:  \"Alicia Becker is a 63 year old female who presents with the above complaints.  She notes symptoms for about the last 4 days.  She has a history of necrotizing vasculitis, and is on chronic suppressive treatment with prednisone 10 mg daily and methotrexate injections once a week.  She had a stroke related to the vasculitis and then subsequent seizure disorder.  She has a history of bronchiectasis as well.     She developed increased shortness of breath associated with a cough productive of yellow and brown sputum over the last several days.  The cough got worse over the last 24 hours and she developed a fever into the low 100s.  She had difficulty breathing and some chest tightness.  Chest pain increased with deep inspiration.  She does have a history of a fall with rib fractures.  She had mild chills as well.     She also has sleep apnea and is on CPAP.  She states she has some difficulty with congestion in the right nostril.     Vasculitis symptoms have been fairly well " "controlled.\"    Hospital Course   Alicia Becker was admitted on 1/22/2019.  The following problems were addressed during her hospitalization:    Community acquired pneumonia   Patient was treated with azithromycin and ceftriaxone.  A CT of the chest confirmed pneumonia and ruled out pulmonary embolism.  She slowly improved during her hospitalization but did require supplemental oxygen by discharge.  She will finish azithromycin and Ceftin basis.    Pulmonary hypertension  Echocardiogram showed moderate pulmonary hypertension.  She was started on Lasix 20 mg daily.  I suspect this is the reason why she has required supplemental oxygen.  She has a history of obstructive sleep apnea which could be the source of her pulmonary hypertension, versus vasculitis.    Necrotizing vasculitis  Patient is on chronic prednisone and methotrexate.  She was treated with stress dose steroids at 20 mg prednisone daily, she can drop back to her 10 mg dose.    Arun Paige MD      Code Status   Full Code       Primary Care Physician   Oriana Casarez    Physical Exam   Temp: 97.8  F (36.6  C) Temp src: Tympanic BP: 156/80   Heart Rate: 88 Resp: 22 SpO2: 93 % O2 Device: Nasal cannula Oxygen Delivery: 3 LPM  Vitals:    01/24/19 0300 01/26/19 0600 01/27/19 0500   Weight: 88.7 kg (195 lb 9.6 oz) 82.2 kg (181 lb 3.2 oz) 83.1 kg (183 lb 3.2 oz)     Vital Signs with Ranges  Temp:  [96.8  F (36  C)-99.5  F (37.5  C)] 97.8  F (36.6  C)  Heart Rate:  [] 88  Resp:  [20-24] 22  BP: (138-176)/(61-97) 156/80  SpO2:  [90 %-93 %] 93 %  I/O last 3 completed shifts:  In: 320 [P.O.:320]  Out: 1850 [Urine:1850]    GENERAL: Comfortable, no apparent distress.  CARDIOVASCULAR: regular rate and rhythm, no murmur. No lower extremity edema   RESPIRATORY: Clear to auscultation bilaterally, no wheezes or crackles.  GI: non-tender, non-distended, normal bowel sounds.   SKIN: warm periphery, no rashes      Discharge Disposition   Discharged to home  Condition " at discharge: Stable    Consultations This Hospital Stay   PHYSICAL THERAPY ADULT IP CONSULT  OCCUPATIONAL THERAPY ADULT IP CONSULT    Time Spent on this Encounter   I, Arun Paige, personally saw the patient today and spent greater than 30 minutes discharging this patient.    Discharge Orders      Reason for your hospital stay    Pneumonia     Follow-up and recommended labs and tests    Follow up with Dr. Casarez on 2/8 at [facility] [place].     Activity    Your activity upon discharge: activity as tolerated     Full Code     Oxygen Adult    [facility] [place] [facility] Order 2 liter(s) by nasal cannula continuously with use of portable tank. Expected treatment length is indefinite (99 months).. Test on conserving device as applicable.    Patients who qualify for home O2 coverage under the CMS guidelines require ABG tests or O2 sat readings obtained closest to, but no earlier than 2 days prior to the discharge, as evidence of the need for home oxygen therapy. Testing must be performed while patient is in the chronic stable state. See notes for O2 sats.    I certify that this patient, Alicia Becker has been under my care and that I, or a nurse practitioner or physician's assistant working with me, had a face-to-face encounter that meets the face-to-face encounter requirements with this patient on 1/25/2019. The patient, Alicia Becker was evaluated or treated in whole, or in part, for the following medical condition, which necessitates the use of the ordered oxygen. Treatment Diagnosis: Pulmonary hypertension, community acquired pneumonia     Attending Provider: Arun Paige  Physician signature: See electronic signature associated with these discharge orders  Date of Order: January 25, 2019     Diet    Follow this diet upon discharge: Orders Placed This Encounter      Regular Diet Adult       Discharge Medications   Current Discharge Medication List      START taking these medications    Details   azithromycin (ZITHROMAX)  250 MG tablet Take 1 tablet (250 mg) by mouth daily for 3 days  Qty: 3 tablet, Refills: 0    Associated Diagnoses: Community acquired bacterial pneumonia      cefuroxime (CEFTIN) 500 MG tablet Take 1 tablet (500 mg) by mouth every 12 hours for 7 days  Qty: 14 tablet, Refills: 0    Associated Diagnoses: Community acquired bacterial pneumonia      furosemide (LASIX) 20 MG tablet Take 1 tablet (20 mg) by mouth daily  Qty: 30 tablet, Refills: 0    Associated Diagnoses: Pulmonary hypertension (H)         CONTINUE these medications which have NOT CHANGED    Details   acetaminophen (TYLENOL) 500 MG tablet Take 2 tablets by mouth every night at bedtime, also may take 2 tablets by mouth every 6 hours as needed for pain. Max acetaminophen dose: 4000mg in 24 hrs.      albuterol (VENTOLIN HFA) 108 (90 BASE) MCG/ACT Inhaler Inhale 1-2 puffs into the lungs every 4 hours as needed for shortness of breath / dyspnea Shake before using.      amLODIPine (NORVASC) 5 MG tablet Take 5 mg by mouth daily      aspirin 81 MG chewable tablet Take 81 mg by mouth daily With a meal      glipiZIDE (GLUCOTROL) 5 MG tablet Take 2.5 mg by mouth daily      ipratropium - albuterol 0.5 mg/2.5 mg/3 mL (DUONEB) 0.5-2.5 (3) MG/3ML neb solution Inhale one neb by mouth once daily. When patient has a respiratory illness may increase to 2-3 times daily.      lacosamide (VIMPAT) 200 MG TABS tablet Take 200 mg by mouth 2 times daily      losartan (COZAAR) 100 MG tablet Take 100 mg by mouth daily      metFORMIN (GLUCOPHAGE) 500 MG tablet Take by mouth 2 times daily (with meals) Take 2 tablets with breakfast and 1 with dinner      Methotrexate, PF, 10 MG/0.2ML SOAJ Inject 0.8 mLs Subcutaneous every 7 days       montelukast (SINGULAIR) 10 MG tablet Take 10 mg by mouth At Bedtime      multivitamin w/minerals (MULTI-VITAMIN) tablet Take 1 tablet by mouth daily      omega 3 1000 MG CAPS Take 1 capsule by mouth 3 times daily (with meals)      OXcarbazepine (TRILEPTAL)  "600 MG tablet Take 2 tablets by mouth 2 times daily      potassium chloride SA (K-DUR/KLOR-CON M) 20 MEQ CR tablet Take 1 tablet by mouth 2 times daily (with meals) Do not crush.      !! predniSONE (DELTASONE) 1 MG tablet Take 3-4 capsules by mouth once daily with 5 mg tablet to equate either 8 mg or 9 mg once daily alternating every other day. Continue with taper as directed.      !! predniSONE (DELTASONE) 5 MG tablet Take 1 capsules by mouth once daily with 1 mg tablets to equate either 8 mg or 9 mg once daily alternating every other day. Continue with taper as directed.      ranitidine (ZANTAC) 150 MG tablet Take 150 mg by mouth daily      simvastatin (ZOCOR) 20 MG tablet Take 1 tablet by mouth At Bedtime      sodium chloride 1 GM tablet Take 4 tablets by mouth 3 times daily (with meals)      ascorbic acid (VITAMIN C) 500 MG tablet Take 500 mg by mouth daily       blood glucose monitoring (GOOD CONTOUR NEXT) test strip USE AS DIRECTED TESTING 3 TO 4 TIMES DAILY      blood glucose monitoring (GOOD MICROLET) lancets USE AS DIRECTED TESTING FOUR TIMES DAILY      Calcium carb-Vitamin D 500 mg [place]-200 units (OSCAL WITH D;OYSTER SHELL CALCIUM) 500-200 MG-UNIT per tablet Take 1 tablet by mouth 2 times daily      folic acid (FOLVITE) 1 MG tablet Take 1 mg by mouth daily      glucagon 1 MG kit Inject 1 mg into the muscle once As needed for low blood sugar      Saline (SODIUM CHLORIDE) 0.65 % SOLN Spray 1 spray in nostril nightly as needed For nasal dryness      Tuberculin-Allergy Syringes (B-D TB SYRINGE) 25G X 5/8\" 1 ML MISC USE ONE NEW SYRINGE WITH EACH INJECTION EVERY 7 DAYS      vitamin E (VITAMIN E) 400 UNIT capsule Take 400 Units by mouth daily        !! - Potential duplicate medications found. Please discuss with provider.      STOP taking these medications       senna-docusate (SENOKOT-S;PERICOLACE) 8.6-50 MG per tablet Comments:   Reason for Stopping:             Allergies   Allergies   Allergen Reactions     " Ciprofloxacin      Other reaction(s): Seizures     Levofloxacin      Other reaction(s): Seizures     Quinolones      Other reaction(s): Seizures     Carbamazepine      Other reaction(s): Other - Describe In Comment Field  Ineffective for seizure control.     Levetiracetam      Other reaction(s): Other - Describe In Comment Field  Ineffective at 750 mg BID for seizure control.      Lisinopril      Other reaction(s): Yeast Infection     Niacin      Other reaction(s): *Unknown  Persistant flushing     Sulfa Drugs      Other reaction(s): *Unknown     Valproic Acid      Other reaction(s): Other - Describe In Comment Field  Ineffective for seizure control.      Data   Most Recent 3 CBC's:  Recent Labs   Lab Test 01/25/19 0440 01/24/19 0454 01/22/19 2245   WBC 8.1 9.4 13.1*   HGB 10.8* 10.7* 11.8   * 103* 102*    184 188      Most Recent 3 BMP's:  Recent Labs   Lab Test 01/25/19 0440 01/24/19 0454 01/22/19 2245    137 136   POTASSIUM 3.7 4.0 4.0   CHLORIDE 93* 97* 101   CO2 36* 31 25   BUN 9 9 10   CR 0.45* 0.43* 0.44*   ANIONGAP 7 9 10   OSCAR 9.1 9.2 9.3   * 195* 182*     Most Recent 2 LFT's:  Recent Labs   Lab Test 01/22/19 2245   AST 12*   ALT 12   ALKPHOS 48   BILITOTAL 0.4     Most Recent INR's and Anticoagulation Dosing History:  Anticoagulation Dose History     There is no flowsheet data to display.        Most Recent 3 Troponin's:  Recent Labs   Lab Test 01/22/19 2245   TROPI <0.030     Most Recent Cholesterol Panel:No lab results found.  Most Recent 6 Bacteria Isolates From Any Culture (See EPIC Reports for Culture Details):  Recent Labs   Lab Test 01/22/19 2245   CULT No growth after 5 days  No growth after 5 days     Most Recent TSH, T4 and A1c Labs:  Recent Labs   Lab Test 01/22/19  0939   A1C 7.4*     Results for orders placed or performed during the hospital encounter of 01/22/19   XR Chest 2 Views    Narrative    EXAM:    XR Chest, 2 Views     EXAM DATE/TIME:    1/22/2019  11:12 PM     CLINICAL HISTORY:    63 years old, female; Signs and symptoms; Cough; Additional info: Cough, SOB,   low o2 and fever     TECHNIQUE:    XR of the chest, 2 views.     COMPARISON:    CR XR CHEST 2 VIEWS PA AND LATERAL 12/27/2016 6:54 AM     FINDINGS:    Lungs:  Opacities in both bases may represent atelectasis or pneumonia.    Pleural space: Unremarkable. No pleural effusion. No pneumothorax.    Heart/Mediastinum:  Stable cardiac silhouette    Upper abdomen:  Elevated right hemidiaphragm    Bones/joints: Unremarkable.    Other findings:  EKG wires       Impression    IMPRESSION:   Opacities in both bases may represent atelectasis or pneumonia.     THIS DOCUMENT HAS BEEN ELECTRONICALLY SIGNED BY BOZENA REEVES MD   CT Chest pulmonary embolism w contrast    Narrative    PROCEDURE:  CT CHEST PULMONARY EMBOLISM W CONTRAST.    HISTORY:  Evaluate for pulmonary embolism.  PE suspected, intermediate  prob, positive D-dimer    TECHNIQUE:  Initial pre-contrast  and localizer images were  obtained.  Contrast enhanced helical CT pulmonary angiography was then  performed.  Routine transaxial and post-processed (multiplanar and/or  MIP) reformations were obtained.    COMPARISON:  Chest radiographs 1/22/2019.    MEDS/CONTRAST: Omnipaque 350, 100 mL IV    PULMONARY CTA FINDINGS:  This is a diagnostic quality helical CT  pulmonary angiogram.  There is no acute pulmonary embolism to the  first subsegmental pulmonary artery level.    OTHER FINDINGS:      Heart size is within normal limits. Coronary calcifications are seen.  The main pulmonary artery is borderline enlarged. The thoracic aorta  is within normal limits in size. Borderline enlarged mediastinal and  hilar nodes are present, potentially reactive.    Limited views of the upper abdomen reveal no adrenal mass or acute  process.     No pleural effusion is present. The central airways are patent. There  is elevation of the right hemidiaphragm, chronic. Diffuse  nodular and  groundglass opacities are present throughout the lungs. Some of these  have a branching tree-in-bud pattern.    Focal gibbus deformity at the thoracolumbar junction. The thoracic  kyphosis is straightened.      Impression    IMPRESSION:    No acute pulmonary emboli.    Diffuse nodular and groundglass opacities concerning for disseminated  most acute bronchopneumonia, reflecting the findings seen on the prior  radiograph. Follow-up to resolution is requested.    WANDER ZHANG MD     Recent Results (from the past 4320 hour(s))   Echocardiogram Complete    Narrative    [id number]  [id number]  [id number]  [id number]^RENETTA^NUZHAT^MARIEL        [facility]  [address] [place] [address]     Name: LOGAN SAENZ  MRN: [id number]  : 1955  Study Date: 2019 11:49 AM  Age: 63 yrs  Gender: Female  Patient Location: [facility]  Reason For Study: RODRÍGUEZ  Ordering Physician: NUZHAT JACKSON  Performed By: Manisha Garcia RDCS, RVT     BSA: 1.9 m2  Height: 63 in  Weight: 195 lb  HR: 111  BP: 134/76 mmHg  _____________________________________________________________________________  __        Procedure  Complete Portable Echo Adult. Echocardiogram with two-dimensional, color and  spectral Doppler performed.  _____________________________________________________________________________  __        Interpretation Summary  Global and regional left ventricular function is normal with an EF of 60-65%.  Global right ventricular function is normal.  No significant valvular abnormalities were noted.  Estimated pulmonary artery systolic pressure is 49 mmHg conssitent with  pulmonary hypertension.  Estimated mean right atrial pressure is 8 mmHg (mildly elevated).  No pericardial effusion is present.  _____________________________________________________________________________  __        Left Ventricle  Left ventricular size is normal. Left ventricular wall thickness is normal.  Global and  regional left ventricular function is normal with an EF of 60-65%.  Diastolic function not assessed due to tachycardia.     Right Ventricle  The right ventricle is normal size. Global right ventricular function is  normal.     Atria  Both atria appear normal.     Mitral Valve  Mild mitral annular calcification is present.     Aortic Valve  Aortic valve is normal in structure and function.        Tricuspid Valve  The tricuspid valve is normal. Trace tricuspid insufficiency is present.  Estimated pulmonary artery systolic pressure is 41 mmHg plus right atrial  pressure.     Pulmonic Valve  The pulmonic valve is normal.     Vessels  The aorta root is normal. Dilation of the inferior vena cava is present with  normal respiratory variation in diameter. Estimated mean right atrial pressure  is 8 mmHg (mildly elevated).     Pericardium  No pericardial effusion is present.     Compared to Previous Study  Previous study not available for comparison.     _____________________________________________________________________________  __  MMode/2D Measurements & Calculations  IVSd: 1.2 cm  LVIDd: 4.3 cm  LVIDs: 3.1 cm  LVPWd: 1.1 cm  FS: 29.2 %     LV mass(C)d: 173.8 grams  LV mass(C)dI: 90.9 grams/m2  asc Aorta Diam: 3.6 cm  LVOT diam: 2.0 cm  LVOT area: 3.1 cm2  LA Volume (BP): 38.9 ml  LA Volume Index (BP): 20.4 ml/m2  RWT: 0.49        Doppler Measurements & Calculations  Ao V2 max: 171.0 cm/sec  Ao max P.0 mmHg  BRO(V,D): 2.6 cm2  LV V1 max P.0 mmHg  LV V1 max: 141.0 cm/sec     TR max sebastian: 322.0 cm/sec  TR max P.5 mmHg  AV Sebastian Ratio (DI): 0.82           _____________________________________________________________________________  __           Report approved by: Yeny Callaway 2019 01:25 PM

## 2019-01-27 NOTE — PLAN OF CARE
Pt VS Temp: 98.8  F (37.1  C) Temp src: Tympanic BP: 161/89   Heart Rate: 91 Resp: 26 SpO2: 90 % O2 Device: Nasal cannula Oxygen Delivery: 3 LPM      A&O X4 but slightly forgetful at times. Complains of no pain this shift. Upon assessment, lungs had coarse crackles and diminished with HR tachycardic. Skin remains intact ex slight trace edema in the ankles and feet. Bowel sounds are active and pt has been up the bathroom multiple times this shift with SBA. Blood sugars 178 and 214. Pt verbalizes being ready to go home. Graciela Vaca RN on 1/27/2019 at 1:14 PM

## 2019-01-27 NOTE — PROGRESS NOTES
Discharge Note    Data:  Alicia Becker discharged to home at 1430 via wheel chair. Accompanied by spouse and staff.    Action:  Written discharge/follow-up instructions were provided to patient and spouse. Prescriptions sent to patients preferred pharmacy. All belongings sent with patient.  Pharmacy met with patient regarding new medications and pt did not have any further questions.  Patient was sent home with home O2 after being educated on 1/26/19 about the O2. Patient was encouraged to call the agency if any further questions came up.    Response:  Patient verbalized understanding of discharge instructions, reason for discharge, and necessary follow-up appointments.    Graciela Vaca RN on 1/27/2019 at 2:39 PM

## 2019-01-27 NOTE — PLAN OF CARE
Discharge Planner PT   Patient plan for discharge: To return home with spouse  Current status: Patient required SBA/CGA for safety for mobility. Able to Don/doff shoes and AFO independently. Ambulated in hallway for 450 feet with 5 L of oxygen with activity.   Barriers to return to prior living situation: SOB  Recommendations for discharge: Patient has spouse at home that can assist. Is not wanting more therapies at this time.  Rationale for recommendations: see above.        Entered by: Umesh Gordillo 01/27/2019 12:07 PM

## 2019-01-28 LAB
BACTERIA SPEC CULT: NORMAL
BACTERIA SPEC CULT: NORMAL
SPECIMEN SOURCE: NORMAL
SPECIMEN SOURCE: NORMAL

## 2019-02-14 NOTE — ED NOTES
antibiotic rocephin IV 1g started at this time and is running upon hospital admission.  Late entry

## 2019-02-15 ENCOUNTER — HEALTH MAINTENANCE LETTER (OUTPATIENT)
Age: 64
End: 2019-02-15

## 2019-02-17 DIAGNOSIS — I10 ESSENTIAL (PRIMARY) HYPERTENSION: ICD-10-CM

## 2019-02-20 RX ORDER — IPRATROPIUM BROMIDE AND ALBUTEROL SULFATE 2.5; .5 MG/3ML; MG/3ML
SOLUTION RESPIRATORY (INHALATION)
Qty: 180 ML | Refills: 0 | OUTPATIENT
Start: 2019-02-20

## 2019-02-20 NOTE — TELEPHONE ENCOUNTER
Refill request from [facility] for:  ipratropium - albuterol 0.5 mg/2.5 mg/3 mL (DUONEB) 0.5-2.5 (3) MG/3ML neb solution    Note pt under care of Oriana Bah.    Unable to fill.  Refused with note to pharmacy    Unable to complete prescription refill per RN Medication Refill Policy.................... Katrin Bobby ....................  2/20/2019   11:27 AM

## 2019-03-07 DIAGNOSIS — G47.33 OSA (OBSTRUCTIVE SLEEP APNEA): ICD-10-CM

## 2019-03-07 DIAGNOSIS — R09.02 HYPOXIA: Primary | ICD-10-CM

## 2019-03-29 ENCOUNTER — TELEPHONE (OUTPATIENT)
Dept: SLEEP MEDICINE | Facility: HOSPITAL | Age: 64
End: 2019-03-29

## 2019-04-04 LAB
HPV ABSTRACT: NORMAL
PAP-ABSTRACT: NORMAL

## 2019-04-09 ENCOUNTER — OFFICE VISIT (OUTPATIENT)
Dept: SLEEP MEDICINE | Facility: HOSPITAL | Age: 64
End: 2019-04-09
Attending: INTERNAL MEDICINE

## 2019-04-09 DIAGNOSIS — G47.33 OSA (OBSTRUCTIVE SLEEP APNEA): ICD-10-CM

## 2019-04-09 DIAGNOSIS — R09.02 HYPOXIA: Primary | ICD-10-CM

## 2019-04-09 PROCEDURE — 99207 ZZC NO CHARGE NURSE ONLY: CPT

## 2019-04-09 NOTE — NURSING NOTE
Patient picked up over night oximeter number SL-4. Patient was instructed on use of device. Patient verbalized understanding.     Patient will return device tomorrow by: noon

## 2019-04-10 ENCOUNTER — DOCUMENTATION ONLY (OUTPATIENT)
Dept: SLEEP MEDICINE | Facility: HOSPITAL | Age: 64
End: 2019-04-10
Attending: INTERNAL MEDICINE

## 2019-04-10 DIAGNOSIS — G47.33 OSA (OBSTRUCTIVE SLEEP APNEA): ICD-10-CM

## 2019-04-10 DIAGNOSIS — R09.02 HYPOXIA: ICD-10-CM

## 2019-04-10 PROCEDURE — 99207 ZZC NO CHARGE NURSE ONLY: CPT

## 2019-04-10 NOTE — PROGRESS NOTES
Patient returned the Oximeter the data was downloaded and the report was sent to Dr. Leach and to be scanned into epic. Patient did was her CPAP and oxygen as instructed.

## 2019-05-21 DIAGNOSIS — G47.33 OSA (OBSTRUCTIVE SLEEP APNEA): ICD-10-CM

## 2019-05-21 DIAGNOSIS — R09.02 HYPOXIA: Primary | ICD-10-CM

## 2019-06-04 ENCOUNTER — OFFICE VISIT (OUTPATIENT)
Dept: SLEEP MEDICINE | Facility: HOSPITAL | Age: 64
End: 2019-06-04
Attending: INTERNAL MEDICINE
Payer: COMMERCIAL

## 2019-06-04 DIAGNOSIS — R09.02 HYPOXIA: Primary | ICD-10-CM

## 2019-06-04 PROCEDURE — 99207 ZZC NO CHARGE NURSE ONLY: CPT

## 2019-06-04 NOTE — NURSING NOTE
Patient picked up over night oximeter number SL-1. Patient was instructed on use of device. Patient verbalized understanding.     Patient will return device tomorrow by: noon

## 2019-06-05 ENCOUNTER — DOCUMENTATION ONLY (OUTPATIENT)
Dept: SLEEP MEDICINE | Facility: HOSPITAL | Age: 64
End: 2019-06-05
Attending: INTERNAL MEDICINE
Payer: COMMERCIAL

## 2019-06-05 PROCEDURE — 99207 ZZC NO CHARGE NURSE ONLY: CPT

## 2019-06-05 NOTE — PROGRESS NOTES
Patient returned the oximeter she used it for 4 hours and 19 minutes on 3L/M of O2  The report was sent to scan and sent to Dr. Leach

## 2020-03-11 ENCOUNTER — HEALTH MAINTENANCE LETTER (OUTPATIENT)
Age: 65
End: 2020-03-11

## 2020-08-13 ENCOUNTER — HOSPITAL ENCOUNTER (OUTPATIENT)
Dept: MRI IMAGING | Facility: OTHER | Age: 65
Discharge: HOME | End: 2020-08-13
Attending: NURSE PRACTITIONER | Admitting: FAMILY MEDICINE
Payer: COMMERCIAL

## 2020-08-13 DIAGNOSIS — R56.9 SEIZURES (H): ICD-10-CM

## 2020-08-13 DIAGNOSIS — I77.6 VASCULITIS (H): ICD-10-CM

## 2020-08-13 PROCEDURE — A9575 INJ GADOTERATE MEGLUMI 0.1ML: HCPCS | Performed by: RADIOLOGY

## 2020-08-13 PROCEDURE — 70553 MRI BRAIN STEM W/O & W/DYE: CPT

## 2020-08-13 PROCEDURE — 25500064 ZZH RX 255 OP 636: Performed by: RADIOLOGY

## 2020-08-13 RX ORDER — GADOTERATE MEGLUMINE 376.9 MG/ML
15 INJECTION INTRAVENOUS ONCE
Status: COMPLETED | OUTPATIENT
Start: 2020-08-13 | End: 2020-08-13

## 2020-08-13 RX ADMIN — GADOTERATE MEGLUMINE 15 ML: 376.9 INJECTION INTRAVENOUS at 16:38

## 2020-08-19 DIAGNOSIS — R26.89 UNSTABLE BALANCE: Primary | ICD-10-CM

## 2020-09-15 ENCOUNTER — HOSPITAL ENCOUNTER (OUTPATIENT)
Dept: PHYSICAL THERAPY | Facility: OTHER | Age: 65
Setting detail: THERAPIES SERIES
End: 2020-09-15
Attending: NURSE PRACTITIONER
Payer: COMMERCIAL

## 2020-09-15 PROCEDURE — 97110 THERAPEUTIC EXERCISES: CPT | Mod: GP

## 2020-09-15 PROCEDURE — 97161 PT EVAL LOW COMPLEX 20 MIN: CPT | Mod: GP

## 2020-09-15 NOTE — PROGRESS NOTES
"   09/15/20 1500   Quick Adds   Type of Visit Initial OP PT Evaluation       Present No   General Information   Start of Care Date 09/15/20   Referring Physician Ene Murray CNP    Orders Evaluate and Treat as Indicated   Additional Orders eval and treat, balance training    Order Date 08/19/20   Medical Diagnosis Unstable balance R26.89   Onset of illness/injury or Date of Surgery 08/19/20   Precautions/Limitations fall precautions   Surgical/Medical history reviewed Yes   Pertinent history of current problem (include personal factors and/or comorbidities that impact the POC) Patient is a 64 year old female referred to physical therapy with impaired balance. She reports that she has had some falls but feels they are more accidental than anything. For instance, she had a fall when reaching for her couch when it was actually a rocking chair and it was just unexpected becuase of the rocking motion. Back in 2012, she had some strokes that really \"took her off her feet\". She went down to the [place] and was down there for 10 days. She started to develop seizures after her strokes. Scans showed a mass and after a MRI it showed what she described as \"dead tissue\". Had another MRI recently and this came back normal. She has a drop foot on the left side from the previous strokes and she has an AFO. ALso reports having hammer toe on that side and it effects her balance due to the apin with this.    Pertinent Visual History  Reports that she is starting to get \"floaties\" in her vision and needs to rest with this happens.    Current Community Support Family/friend caregiver   Patient role/Employment history Retired   Living environment House/[facility]   Home/Community Accessibility Comments 4 steps to get into the house with railings. No stairs on the inside. No issues getting up/down stairs   Current Assistive Devices Standard Cane   Assistive Devices Comments Uses cane for walking on uneven ground in the " road   Patient/Family Goals Statement to improve her balance   Fall Risk Screen   Fall screen completed by PT   Have you fallen 2 or more times in the past year? Yes   Have you fallen and had an injury in the past year? Yes   Is patient a fall risk? Yes;Department fall risk interventions implemented   Pain   Patient currently in pain No   Cognitive Status Examination   Orientation orientation to person, place and time   Level of Consciousness alert   Follows Commands and Answers Questions 100% of the time   Personal Safety and Judgment intact   Memory intact   Posture   Posture Protracted shoulders;Forward head position   Palpation   Palpation No significant findings   Range of Motion (ROM)   ROM Comment Patient has drop foot on her left ankle/foot. Otherwise knee and hip are WFL   Strength   Manual Muscle Testing Quick Adds MMT: Hip;MMT: Knee;MMT: Ankle   MMT: Hip, Rehab Eval   Hip Flexion - Left Side (4-/5) good minus, left   Hip ABduction - Left Side (4/5) good, left   Hip ADduction - Left Side (5/5) normal,left   Hip Flexion - Right Side (4/5) good, right   Hip ABduction - Right Side (4/5) good, right   Hip ADduction - Right Side (5/5) normal,right   MMT: Knee, Rehab Eval   Knee Flexion - Left Side (5/5) normal,left   Knee Extension - Left Side (5/5) normal,left   Knee Flexion - Right Side (5/5) normal,right   Knee Extension - Right Side (5/5) normal,right   MMT: Ankle, Rehab Eval   Ankle Dorsiflexion - Right Side (5/5) normal,right   Transfer Skills   Transfer Comments Transfers well with use of UE's. Sit<>stand she was able to get up with push off   Locomotion   Wheel Chair Mobility Comments n/a   Gait   Gait Comments Slower gait speed. Has AFo on left LE from previous stroke and her drop foot. Has shuffling pattern at times with catching of her toe. Lack of push off bilaterally   Gait Special Tests   Gait Special Tests DYNAMIC GAIT INDEX   Gait Special Tests Dynamic Gait Index   Score out of 24 13/24    Balance   Balance Comments Patient is albe to stand normal stance, narrowed stance, and tandem stance on solid ground without loss of balance. Does have some sway but is able to correct. Evidence of imbalance with eyes closed.    Sensory Examination   Sensory Perception Comments Intact to light touch   Muscle Tone   Muscle Tone no deficits were identified   Planned Therapy Interventions   Planned Therapy Interventions balance training;bed mobility training;gait training;joint mobilization;neuromuscular re-education;ROM;strengthening;stretching;transfer training;manual therapy   Clinical Impression   Criteria for Skilled Therapeutic Interventions Met yes, treatment indicated   PT Diagnosis Impaired balance, decreased strength and endurance, impaired mobility   Influenced by the following impairments Weakness, unsteadiness   Functional limitations due to impairments prolonged activity, bending/squatting, turning too quickly   Clinical Presentation Stable/Uncomplicated   Clinical Presentation Rationale Clinical judgement   Clinical Decision Making (Complexity) Low complexity   Therapy Frequency other (see comments)  (1-2 times per week)   Predicted Duration of Therapy Intervention (days/wks) 8 weeks   Risk & Benefits of therapy have been explained Yes   Patient, Family & other staff in agreement with plan of care Yes   Clinical Impression Comments SIgns and symptoms consistent with impaired balance. She has most of her difficulty with turning too quckly, head turns, and with single leg activities. She did fairly well while standing on foam in a normal stance but struggled some when the stance became more narrow. Weakness noted on her left hip with flexion and abduction. Uses AFO all the time. She would benefit from skilled PT services in order to improve her mobility, strength, endurance, and balance   Education Assessment   Barriers to Learning No barriers   GOALS   PT Eval Goals 1;2;3   Goal 1   Goal Identifier  Fall risk   Goal Description Patient will improve her DGI score to 17/24 in order to reduce her risk of falling   Target Date 10/13/20   Goal 2   Goal Identifier Ambulation   Goal Description Patient will be able to ambulate outdoors and indoors with no loss of balance or falls consistently over two weeks in order to reduce her risk of falling   Target Date 11/10/20   Goal 3   Goal Identifier Home   Goal Description Patient will be able to complete all of her home tasks consistently without a loss of balance or fall in order to improve her safety while at home   Target Date 11/10/20   Total Evaluation Time   PT Wyatt, Low Complexity Minutes ([id number]) 45

## 2020-09-22 ENCOUNTER — HOSPITAL ENCOUNTER (OUTPATIENT)
Dept: PHYSICAL THERAPY | Facility: OTHER | Age: 65
Setting detail: THERAPIES SERIES
End: 2020-09-22
Attending: NURSE PRACTITIONER
Payer: COMMERCIAL

## 2020-09-22 PROCEDURE — 97110 THERAPEUTIC EXERCISES: CPT | Mod: GP

## 2020-09-22 PROCEDURE — 97112 NEUROMUSCULAR REEDUCATION: CPT | Mod: GP

## 2020-09-29 ENCOUNTER — HOSPITAL ENCOUNTER (OUTPATIENT)
Dept: PHYSICAL THERAPY | Facility: OTHER | Age: 65
Setting detail: THERAPIES SERIES
End: 2020-09-29
Attending: NURSE PRACTITIONER
Payer: COMMERCIAL

## 2020-09-29 PROCEDURE — 97110 THERAPEUTIC EXERCISES: CPT | Mod: GP

## 2020-09-29 PROCEDURE — 97112 NEUROMUSCULAR REEDUCATION: CPT | Mod: GP

## 2020-10-01 ENCOUNTER — HOSPITAL ENCOUNTER (OUTPATIENT)
Dept: PHYSICAL THERAPY | Facility: OTHER | Age: 65
Setting detail: THERAPIES SERIES
End: 2020-10-01
Attending: NURSE PRACTITIONER
Payer: COMMERCIAL

## 2020-10-01 PROCEDURE — 97112 NEUROMUSCULAR REEDUCATION: CPT | Mod: GP

## 2020-10-01 PROCEDURE — 97110 THERAPEUTIC EXERCISES: CPT | Mod: GP

## 2020-10-06 ENCOUNTER — HOSPITAL ENCOUNTER (OUTPATIENT)
Dept: PHYSICAL THERAPY | Facility: OTHER | Age: 65
Setting detail: THERAPIES SERIES
End: 2020-10-06
Attending: NURSE PRACTITIONER
Payer: COMMERCIAL

## 2020-10-06 PROCEDURE — 97112 NEUROMUSCULAR REEDUCATION: CPT | Mod: GP

## 2020-10-06 PROCEDURE — 97110 THERAPEUTIC EXERCISES: CPT | Mod: GP

## 2020-10-14 ENCOUNTER — HOSPITAL ENCOUNTER (OUTPATIENT)
Dept: PHYSICAL THERAPY | Facility: OTHER | Age: 65
Setting detail: THERAPIES SERIES
End: 2020-10-14
Attending: NURSE PRACTITIONER
Payer: COMMERCIAL

## 2020-10-14 PROCEDURE — 97530 THERAPEUTIC ACTIVITIES: CPT | Mod: GP

## 2020-10-14 PROCEDURE — 97110 THERAPEUTIC EXERCISES: CPT | Mod: GP

## 2020-12-01 NOTE — PROGRESS NOTES
Outpatient Physical Therapy Discharge Note     Patient: Alicia Becker  : 1955    Beginning/End Dates of Reporting Period:  9/15/2020 to 2020    Referring Provider: Ene Murray CNP     Therapy Diagnosis: Impaired balance, decreased strength and endurance, impaired mobility     Client Self Report: Reports that she continues to do well. No falls since last visit. Today is her last scheduled visit and she is comfortable with this. She ihas been compliant with her HEP and feels comfortable completing it on her own. She would like to keep her chart open in case she needs to get back into therapy    Objective Measurements:  Objective Measure: Subjective pain rating  Details: No pain today    Goals:  Goal Identifier Fall risk   Goal Description Patient will improve her DGI score to 17/24 in order to reduce her risk of falling   Target Date 10/13/20   Date Met      Progress:     Goal Identifier Ambulation   Goal Description Patient will be able to ambulate outdoors and indoors with no loss of balance or falls consistently over two weeks in order to reduce her risk of falling   Target Date 11/10/20   Date Met      Progress:     Goal Identifier Home   Goal Description Patient will be able to complete all of her home tasks consistently without a loss of balance or fall in order to improve her safety while at home   Target Date 11/10/20   Date Met      Progress:     Progress Toward Goals:   Unable to assess progress towards goals as discharge is unplanned.     Plan:  Discharge from therapy.    Discharge:    Reason for Discharge: Patient has failed to schedule further appointments.    Equipment Issued: none    Discharge Plan: Follow up with physician as needed

## 2020-12-27 ENCOUNTER — HEALTH MAINTENANCE LETTER (OUTPATIENT)
Age: 65
End: 2020-12-27

## 2021-02-02 ENCOUNTER — HOSPITAL ENCOUNTER (EMERGENCY)
Facility: OTHER | Age: 66
Discharge: HOME | End: 2021-02-02
Attending: EMERGENCY MEDICINE | Admitting: EMERGENCY MEDICINE
Payer: COMMERCIAL

## 2021-02-02 ENCOUNTER — APPOINTMENT (OUTPATIENT)
Dept: GENERAL RADIOLOGY | Facility: OTHER | Age: 66
End: 2021-02-02
Attending: EMERGENCY MEDICINE
Payer: COMMERCIAL

## 2021-02-02 VITALS
BODY MASS INDEX: 27.58 KG/M2 | HEART RATE: 99 BPM | SYSTOLIC BLOOD PRESSURE: 174 MMHG | TEMPERATURE: 97.8 F | DIASTOLIC BLOOD PRESSURE: 96 MMHG | HEIGHT: 63 IN | RESPIRATION RATE: 20 BRPM | OXYGEN SATURATION: 91 % | WEIGHT: 155.65 LBS

## 2021-02-02 DIAGNOSIS — S92.415A CLOSED NONDISPLACED FRACTURE OF PROXIMAL PHALANX OF LEFT GREAT TOE, INITIAL ENCOUNTER: ICD-10-CM

## 2021-02-02 PROCEDURE — 99282 EMERGENCY DEPT VISIT SF MDM: CPT | Performed by: EMERGENCY MEDICINE

## 2021-02-02 PROCEDURE — 73630 X-RAY EXAM OF FOOT: CPT | Mod: LT

## 2021-02-02 PROCEDURE — 99283 EMERGENCY DEPT VISIT LOW MDM: CPT | Performed by: EMERGENCY MEDICINE

## 2021-02-02 ASSESSMENT — MIFFLIN-ST. JEOR: SCORE: 1220.13

## 2021-02-03 NOTE — ED TRIAGE NOTES
"Pt comes in with L foot 1st digit pain, pt has \"hammer toe\" and callus to toe as well, complicating issue.  Pt reports she fell today, landing on L foot.  "

## 2021-02-03 NOTE — ED PROVIDER NOTES
"Emergency Department Provider Note  : 1955 Age: 65 year old Sex: female MRN: [id number]    Chief Complaint   Patient presents with     Toe Pain       Medical Decision Making / Assessment / Plan   65 year old female presenting with toe injury. No open wounds, neurovascularly intact distally. XR of toe with fracture of great toe. Musa tape applied, opted to keep her in her foot brace that prevents foot drop. She will use the walker to ambulate for now. Will f/u with Podiatry.     The patient was informed of the plan and verbalized understanding and agreed with the plan. The patient was given strict return to Emergency Department precautions as well as appropriate follow up instructions. The patient was discharged in stable condition.    Final diagnoses:   Closed nondisplaced fracture of proximal phalanx of left great toe, initial encounter       Vinh Garcia MD  2021   Emergency Department    Subjective   Alicia is a 65 year old female who presents with left toe pain after tripping and landing on her toe. Denies other injury, did not hit head. History of foot drop. Able to ambulate with the assistance of a walker with her toes.     I have reviewed the Medications, Allergies, Past Medical and Surgical History, and Social History in the [employer] [facility] and with family.    Review of Systems:  Please see HPI for pertinent positives and negatives. All other systems reviewed and found to be negative.      Objective   BP: (!) 174/96  Pulse: 99  Temp: 97.8  F (36.6  C)  Resp: 20  Height: 160 cm (5' 3\")  Weight: 70.6 kg (155 lb 10.3 oz)  SpO2: 91 %    Physical Exam:   General: Awake, alert, in no acute distress.  Head: Normocephalic, atraumatic.  Eyes: Conjugate gaze.  ENT: Moist membranes, external ear appears normal.   Chest/Respiratory: Equal chest rise.  Cardiovascular: Peripheral pulses present.  Abdominal: Soft, non-distended, non-tender.  Extremities: No obvious deformity. L hammer toe of great toe. " Bruising at IP joint. ROM intact with pain. No skin tearing..  Neurological: GCS 15, moving all extremities without gross deficit.  Skin: Warm, no rashes, lesions, or bruising.   Psychiatric: Appropriate affect.     Procedures / Critical Care   Procedures    Critical Care Time: None.          Medical/Surgical History:  No past medical history on file.  Past Surgical History:   Procedure Laterality Date     AS FLEX SIGMOIDOSCOPY W BIOPSY  01/24/2014    [facility], results not in chart     COLONOSCOPY  02/03/2014    10 year fu 2/3/24       Medications:  No current facility-administered medications for this encounter.      Current Outpatient Medications   Medication     acetaminophen (TYLENOL) 500 MG tablet     albuterol (VENTOLIN HFA) 108 (90 BASE) MCG/ACT Inhaler     amLODIPine (NORVASC) 5 MG tablet     ascorbic acid (VITAMIN C) 500 MG tablet     aspirin 81 MG chewable tablet     blood glucose monitoring (CellScope CONTOUR NEXT) test strip     blood glucose monitoring (CellScope MICROLET) lancets     Calcium carb-Vitamin D 500 mg [place]-200 units (OSCAL WITH D;OYSTER SHELL CALCIUM) 500-200 MG-UNIT per tablet     folic acid (FOLVITE) 1 MG tablet     furosemide (LASIX) 20 MG tablet     glipiZIDE (GLUCOTROL) 5 MG tablet     glucagon 1 MG kit     ipratropium - albuterol 0.5 mg/2.5 mg/3 mL (DUONEB) 0.5-2.5 (3) MG/3ML neb solution     lacosamide (VIMPAT) 200 MG TABS tablet     losartan (COZAAR) 100 MG tablet     metFORMIN (GLUCOPHAGE) 500 MG tablet     Methotrexate, PF, (RASUVO) 10 MG/0.2ML autoinjector     montelukast (SINGULAIR) 10 MG tablet     multivitamin w/minerals (MULTI-VITAMIN) tablet     omega 3 1000 MG CAPS     OXcarbazepine (TRILEPTAL) 600 MG tablet     potassium chloride SA (K-DUR/KLOR-CON M) 20 MEQ CR tablet     predniSONE (DELTASONE) 1 MG tablet     predniSONE (DELTASONE) 5 MG tablet     ranitidine (ZANTAC) 150 MG tablet     Saline (SODIUM CHLORIDE) 0.65 % SOLN     simvastatin (ZOCOR) 20 MG tablet     sodium chloride 1  "GM tablet     Tuberculin-Allergy Syringes (B-D TB SYRINGE) 25G X 5/8\" 1 ML MISC     vitamin E (VITAMIN E) 400 UNIT capsule       Allergies:  Ciprofloxacin, Levofloxacin, Quinolones, Carbamazepine, Levetiracetam, Lisinopril, Niacin, Sulfa drugs, and Valproic acid    Relevant labs, images, EKGs, Epic and outside hospital (if applicable) charts were reviewed. The findings, diagnosis, plan, and need for follow up were discussed with the patient/family. Nursing notes were reviewed.      Vinh Garcia MD  02/02/21 2052    "

## 2021-07-09 ENCOUNTER — APPOINTMENT (OUTPATIENT)
Dept: GENERAL RADIOLOGY | Facility: OTHER | Age: 66
End: 2021-07-09
Attending: PHYSICIAN ASSISTANT
Payer: COMMERCIAL

## 2021-07-09 ENCOUNTER — HOSPITAL ENCOUNTER (EMERGENCY)
Facility: OTHER | Age: 66
Discharge: HOME | End: 2021-07-09
Attending: PHYSICIAN ASSISTANT | Admitting: PHYSICIAN ASSISTANT
Payer: COMMERCIAL

## 2021-07-09 VITALS
SYSTOLIC BLOOD PRESSURE: 163 MMHG | BODY MASS INDEX: 31.38 KG/M2 | DIASTOLIC BLOOD PRESSURE: 90 MMHG | TEMPERATURE: 98.8 F | WEIGHT: 177.1 LBS | HEIGHT: 63 IN | RESPIRATION RATE: 20 BRPM | OXYGEN SATURATION: 92 % | HEART RATE: 94 BPM

## 2021-07-09 DIAGNOSIS — M25.421 ELBOW EFFUSION, RIGHT: ICD-10-CM

## 2021-07-09 DIAGNOSIS — M71.121 SEPTIC OLECRANON BURSITIS OF RIGHT ELBOW: ICD-10-CM

## 2021-07-09 LAB
ALBUMIN SERPL-MCNC: 4.3 G/DL (ref 3.5–5.7)
ALP SERPL-CCNC: 72 U/L (ref 34–104)
ALT SERPL W P-5'-P-CCNC: 15 U/L (ref 7–52)
ANION GAP SERPL CALCULATED.3IONS-SCNC: 7 MMOL/L (ref 3–14)
AST SERPL W P-5'-P-CCNC: 16 U/L (ref 13–39)
BASOPHILS # BLD AUTO: 0 10E9/L (ref 0–0.2)
BASOPHILS NFR BLD AUTO: 0.6 %
BILIRUB SERPL-MCNC: 0.3 MG/DL (ref 0.3–1)
BUN SERPL-MCNC: 16 MG/DL (ref 7–25)
CALCIUM SERPL-MCNC: 9.3 MG/DL (ref 8.6–10.3)
CHLORIDE SERPL-SCNC: 97 MMOL/L (ref 98–107)
CO2 SERPL-SCNC: 33 MMOL/L (ref 21–31)
CREAT SERPL-MCNC: 0.65 MG/DL (ref 0.6–1.2)
CRP SERPL-MCNC: 4.6 MG/L
DIFFERENTIAL METHOD BLD: ABNORMAL
EOSINOPHIL # BLD AUTO: 0.2 10E9/L (ref 0–0.7)
EOSINOPHIL NFR BLD AUTO: 2.2 %
ERYTHROCYTE [DISTWIDTH] IN BLOOD BY AUTOMATED COUNT: 15.5 % (ref 10–15)
ERYTHROCYTE [SEDIMENTATION RATE] IN BLOOD BY WESTERGREN METHOD: 12 MM/H (ref 1–15)
GFR SERPL CREATININE-BSD FRML MDRD: >90 ML/MIN/{1.73_M2}
GLUCOSE SERPL-MCNC: 220 MG/DL (ref 70–105)
HCT VFR BLD AUTO: 38.9 % (ref 35–47)
HGB BLD-MCNC: 12.8 G/DL (ref 11.7–15.7)
IMM GRANULOCYTES # BLD: 0 10E9/L (ref 0–0.4)
IMM GRANULOCYTES NFR BLD: 0.3 %
LYMPHOCYTES # BLD AUTO: 1.2 10E9/L (ref 0.8–5.3)
LYMPHOCYTES NFR BLD AUTO: 16.8 %
MCH RBC QN AUTO: 33.7 PG (ref 26.5–33)
MCHC RBC AUTO-ENTMCNC: 32.9 G/DL (ref 31.5–36.5)
MCV RBC AUTO: 102 FL (ref 78–100)
MONOCYTES # BLD AUTO: 0.8 10E9/L (ref 0–1.3)
MONOCYTES NFR BLD AUTO: 11.8 %
NEUTROPHILS # BLD AUTO: 4.8 10E9/L (ref 1.6–8.3)
NEUTROPHILS NFR BLD AUTO: 68.3 %
PLATELET # BLD AUTO: 248 10E9/L (ref 150–450)
POTASSIUM SERPL-SCNC: 3.9 MMOL/L (ref 3.5–5.1)
PROT SERPL-MCNC: 7.3 G/DL (ref 6.4–8.9)
RBC # BLD AUTO: 3.8 10E12/L (ref 3.8–5.2)
SODIUM SERPL-SCNC: 137 MMOL/L (ref 134–144)
WBC # BLD AUTO: 7 10E9/L (ref 4–11)

## 2021-07-09 PROCEDURE — 99284 EMERGENCY DEPT VISIT MOD MDM: CPT | Mod: 25 | Performed by: PHYSICIAN ASSISTANT

## 2021-07-09 PROCEDURE — 85652 RBC SED RATE AUTOMATED: CPT | Performed by: PHYSICIAN ASSISTANT

## 2021-07-09 PROCEDURE — 85025 COMPLETE CBC W/AUTO DIFF WBC: CPT | Performed by: PHYSICIAN ASSISTANT

## 2021-07-09 PROCEDURE — 250N000013 HC RX MED GY IP 250 OP 250 PS 637: Performed by: PHYSICIAN ASSISTANT

## 2021-07-09 PROCEDURE — 99283 EMERGENCY DEPT VISIT LOW MDM: CPT | Performed by: PHYSICIAN ASSISTANT

## 2021-07-09 PROCEDURE — 80053 COMPREHEN METABOLIC PANEL: CPT | Performed by: PHYSICIAN ASSISTANT

## 2021-07-09 PROCEDURE — 86140 C-REACTIVE PROTEIN: CPT | Performed by: PHYSICIAN ASSISTANT

## 2021-07-09 PROCEDURE — 87040 BLOOD CULTURE FOR BACTERIA: CPT | Performed by: PHYSICIAN ASSISTANT

## 2021-07-09 PROCEDURE — 73080 X-RAY EXAM OF ELBOW: CPT | Mod: RT

## 2021-07-09 PROCEDURE — 36415 COLL VENOUS BLD VENIPUNCTURE: CPT | Performed by: PHYSICIAN ASSISTANT

## 2021-07-09 RX ORDER — BETAMETHASONE DIPROPIONATE 0.5 MG/G
CREAM TOPICAL
Status: ON HOLD | COMMUNITY
Start: 2020-08-17 | End: 2023-02-17

## 2021-07-09 RX ORDER — CLINDAMYCIN HCL 150 MG
300 CAPSULE ORAL ONCE
Status: COMPLETED | OUTPATIENT
Start: 2021-07-09 | End: 2021-07-09

## 2021-07-09 RX ORDER — CLINDAMYCIN HCL 300 MG
300 CAPSULE ORAL 3 TIMES DAILY
Qty: 30 CAPSULE | Refills: 0 | Status: SHIPPED | OUTPATIENT
Start: 2021-07-09 | End: 2021-07-19

## 2021-07-09 RX ORDER — TRIAMCINOLONE ACETONIDE 1 MG/G
OINTMENT TOPICAL
COMMUNITY
Start: 2021-07-09

## 2021-07-09 RX ORDER — CLOTRIMAZOLE 1 %
CREAM (GRAM) TOPICAL
COMMUNITY
Start: 2019-07-29

## 2021-07-09 RX ORDER — METHOTREXATE SODIUM 25 MG/ML
INJECTION, SOLUTION INTRA-ARTERIAL; INTRAMUSCULAR; INTRAVENOUS
COMMUNITY
Start: 2021-02-22

## 2021-07-09 RX ORDER — LANOLIN ALCOHOL/MO/W.PET/CERES
1000 CREAM (GRAM) TOPICAL DAILY
COMMUNITY
Start: 2021-06-30

## 2021-07-09 RX ADMIN — CLINDAMYCIN HYDROCHLORIDE 300 MG: 150 CAPSULE ORAL at 21:05

## 2021-07-09 ASSESSMENT — ENCOUNTER SYMPTOMS
ABDOMINAL PAIN: 0
ADENOPATHY: 0
SHORTNESS OF BREATH: 0
CONFUSION: 0
HEMATURIA: 0
FEVER: 0
CHEST TIGHTNESS: 0
CHILLS: 0
WOUND: 0
BACK PAIN: 0
BRUISES/BLEEDS EASILY: 0

## 2021-07-09 ASSESSMENT — MIFFLIN-ST. JEOR: SCORE: 1317.45

## 2021-07-10 NOTE — ED PROVIDER NOTES
History     Chief Complaint   Patient presents with     Elbow Pain     HPI  Alicia Becker is a 65 year old female who reports that she fell a week ago on to her right shoulder area.  She denies any injury to her elbow.  She noted some significant swelling to her right elbow over the past couple of days.  She has been out camping and wonders if maybe she was bitten by a bug.  She is right-hand dominant.  She uses her right hand to ambulate with a cane.  Denies any fever or chills.  Denies any other issues at this time.  Allergies:  Allergies   Allergen Reactions     Ciprofloxacin      Other reaction(s): Seizures     Levofloxacin      Other reaction(s): Seizures     Quinolones      Other reaction(s): Seizures     Carbamazepine      Other reaction(s): Other - Describe In Comment Field  Ineffective for seizure control.     Levetiracetam      Other reaction(s): Other - Describe In Comment Field  Ineffective at 750 mg BID for seizure control.      Lisinopril      Other reaction(s): Yeast Infection     Lorazepam Other (See Comments)     Needed so much to stop seizures that she couldn't function.    Needed so much to stop seizures that she couldn't function.       Niacin      Other reaction(s): *Unknown  Persistant flushing     Nsaids Other (See Comments)     Sulfa Drugs      Other reaction(s): *Unknown     Valproic Acid      Other reaction(s): Other - Describe In Comment Field  Ineffective for seizure control.        Problem List:    Patient Active Problem List    Diagnosis Date Noted     Pulmonary hypertension (H) 01/24/2019     Priority: Medium     Community acquired bacterial pneumonia 01/23/2019     Priority: Medium     Community acquired pneumonia, unspecified laterality 01/23/2019     Priority: Medium     Closed fracture of distal end of left radius with routine healing 12/27/2016     Priority: Medium     Cerebral hyponatremia 12/25/2016     Priority: Medium     Immunocompromised patient (H) 12/25/2016      Priority: Medium     Long term current use of systemic steroids 12/25/2016     Priority: Medium     Seizure disorder (H) 12/25/2016     Priority: Medium     Weakness of left side of body 12/23/2016     Priority: Medium     Closed Colles' fracture of left radius 12/21/2016     Priority: Medium     Community acquired pneumonia 06/07/2016     Priority: Medium     Fibrosis of lung (H) 06/07/2016     Priority: Medium     Hypoxia 06/07/2016     Priority: Medium     Ischemic stroke (H) 02/11/2016     Priority: Medium     Polyneuropathy in diseases classified elsewhere (H) 02/11/2016     Priority: Medium     Abnormal chest sounds 01/29/2015     Priority: Medium     Overview:   Overview:   Rhonchi heard on exam when patient is asymptomatic and has normal CXR.       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   BP at goal.   On losartan.   On ASA.   microalbumin 2015  A1C 6.9 2015  Eye Exam 10/2014: no retinopathy.  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 2015 - follow up pulm nodules - if stable  no further chest images necessary.       Generalized seizure (H) 03/13/2012     Priority: Medium     Overview:   Overview:   Needs neurology follow up in March - July 2015 with new neurologist.       Essential (primary) hypertension 05/30/2008     Priority: Medium     Overview:   Overview:   IMO Update       Hypercholesterolemia 05/30/2008     Priority: Medium     Sleep apnea 05/30/2008     Priority: Medium        Past Medical History:    History reviewed. No pertinent past medical history.    Past Surgical History:    Past Surgical History:   Procedure Laterality Date     AS FLEX [facility]  [facility]     COLONOSCOPY  02/03/2014    10 year fu 2/3/24       Family History:    History reviewed. No pertinent family history.    Social History:  Marital Status:   [2]  Social History     Tobacco Use     Smoking status: Former Smoker     Smokeless tobacco: Never Used   Substance Use Topics     Alcohol use: No     Drug use: No     Comment: Drug use: No        Medications:    acetaminophen (TYLENOL) 500 MG tablet  albuterol (VENTOLIN HFA) 108 (90 BASE) MCG/ACT Inhaler  amLODIPine (NORVASC) 5 MG tablet  ascorbic acid (VITAMIN C) 500 MG tablet  aspirin 81 MG chewable tablet  augmented betamethasone dipropionate (DIPROLENE-AF) 0.05 % external cream  calcium carbonate-vitamin D (OYSTER SHELL CALCIUM/D) 500-200 MG-UNIT tablet  clindamycin (CLEOCIN) 300 MG capsule  cyanocobalamin (VITAMIN B-12) 1000 MCG tablet  folic acid (FOLVITE) 1 MG tablet  glipiZIDE (GLUCOTROL) 5 MG tablet  lacosamide (VIMPAT) 200 MG TABS tablet  losartan (COZAAR) 100 MG tablet  metFORMIN (GLUCOPHAGE) 500 MG tablet  methotrexate 250 MG/10ML SOLN injection  montelukast (SINGULAIR) 10 MG tablet  multivitamin w/minerals (MULTI-VITAMIN) tablet  omega 3 1000 MG CAPS  OXcarbazepine (TRILEPTAL) 600 MG tablet  potassium chloride SA (K-DUR/KLOR-CON M) 20 MEQ CR tablet  predniSONE (DELTASONE) 5 MG  "tablet  simvastatin (ZOCOR) 20 MG tablet  sodium chloride 1 GM tablet  triamcinolone (KENALOG) 0.1 % external ointment  vitamin E (VITAMIN E) 400 UNIT capsule  blood glucose monitoring (GOOD CONTOUR NEXT) test strip  blood glucose monitoring (GOOD MICROLET) lancets  clotrimazole (LOTRIMIN) 1 % external cream  glucagon 1 MG kit  ipratropium - albuterol 0.5 mg/2.5 mg/3 mL (DUONEB) 0.5-2.5 (3) MG/3ML neb solution  predniSONE (DELTASONE) 1 MG tablet  Saline (SODIUM CHLORIDE) 0.65 % SOLN  Tuberculin-Allergy Syringes (B-D TB SYRINGE) 25G X 5/8\" 1 ML MISC          Review of Systems   Constitutional: Negative for chills and fever.   HENT: Negative for congestion.    Eyes: Negative for visual disturbance.   Respiratory: Negative for chest tightness and shortness of breath.    Cardiovascular: Negative for chest pain.   Gastrointestinal: Negative for abdominal pain.   Genitourinary: Negative for hematuria.   Musculoskeletal: Negative for back pain.        Right elbow significant swelling and tenderness to palpation.   Skin: Negative for rash and wound.   Neurological: Negative for syncope.   Hematological: Negative for adenopathy. Does not bruise/bleed easily.   Psychiatric/Behavioral: Negative for confusion.   All other systems reviewed and are negative.      Physical Exam   BP: (!) 163/90  Pulse: 94  Temp: 98.8  F (37.1  C)  Resp: 20  Height: 160 cm (5' 3\")  Weight: 80.3 kg (177 lb 1.6 oz)  SpO2: 92 %      Physical Exam  Vitals signs and nursing note reviewed.   Constitutional:       General: She is not in acute distress.     Appearance: She is well-developed. She is not diaphoretic.   HENT:      Head: Normocephalic and atraumatic.   Eyes:      General: No scleral icterus.     Conjunctiva/sclera: Conjunctivae normal.   Neck:      Musculoskeletal: Neck supple.   Cardiovascular:      Rate and Rhythm: Normal rate and regular rhythm.   Pulmonary:      Effort: Pulmonary effort is normal.      Breath sounds: Normal breath " sounds.   Abdominal:      Palpations: Abdomen is soft.      Tenderness: There is no abdominal tenderness.   Musculoskeletal: Normal range of motion.         General: Swelling, tenderness, deformity and signs of injury present.      Comments: Right elbow olecranon swelling.  She has a tiny area of what could be either a bug bite or small skin scratch which could be her entrance wound.  Localized cellulitis.  Otherwise she has full active and passive range of motion with her elbow.  She has no medial or lateral elbow pain.  Full pronation and supination with no discomfort good distal pulses neurologically she is intact.   Lymphadenopathy:      Cervical: No cervical adenopathy.   Skin:     General: Skin is warm and dry.      Findings: No rash.   Neurological:      Mental Status: She is alert and oriented to person, place, and time. Mental status is at baseline.   Psychiatric:         Mood and Affect: Mood normal.         Behavior: Behavior normal.         ED Course         Results for orders placed or performed during the hospital encounter of 07/09/21 (from the past 24 hour(s))   XR Elbow Right G/E 3 Views    Narrative    XR ELBOW RT G/E 3 VIEWS    HISTORY: 65 years Female fall    COMPARISON: None    TECHNIQUE: Right elbow 4 views    FINDINGS: Joint spaces are congruent. There is posterior soft tissue  edema. There is a displaced anterior fat pad suggesting presence of a  small to moderate-sized joint effusion. There is no evidence of  fracture dislocation or significant osseous change.      Impression    IMPRESSION: Posterior soft tissue edema.    Suspect presence of small to moderate joint effusion. No fracture is  present. An occult radial head fracture cannot be entirely excluded.    MICHELE CABAN MD         SYSTEM ID:  [id number]   CBC with platelets differential   Result Value Ref Range    WBC 7.0 4.0 - 11.0 10e9/L    RBC Count 3.80 3.8 - 5.2 10e12/L    Hemoglobin 12.8 11.7 - 15.7 g/dL    Hematocrit 38.9  35.0 - 47.0 %     (H) 78 - 100 fl    MCH 33.7 (H) 26.5 - 33.0 pg    MCHC 32.9 31.5 - 36.5 g/dL    RDW 15.5 (H) 10.0 - 15.0 %    Platelet Count 248 150 - 450 10e9/L    Diff Method Automated Method     % Neutrophils 68.3 %    % Lymphocytes 16.8 %    % Monocytes 11.8 %    % Eosinophils 2.2 %    % Basophils 0.6 %    % Immature Granulocytes 0.3 %    Absolute Neutrophil 4.8 1.6 - 8.3 10e9/L    Absolute Lymphocytes 1.2 0.8 - 5.3 10e9/L    Absolute Monocytes 0.8 0.0 - 1.3 10e9/L    Absolute Eosinophils 0.2 0.0 - 0.7 10e9/L    Absolute Basophils 0.0 0.0 - 0.2 10e9/L    Abs Immature Granulocytes 0.0 0 - 0.4 10e9/L   CRP inflammation   Result Value Ref Range    CRP Inflammation 4.6 <10.0 mg/L   Comprehensive metabolic panel   Result Value Ref Range    Sodium 137 134 - 144 mmol/L    Potassium 3.9 3.5 - 5.1 mmol/L    Chloride 97 (L) 98 - 107 mmol/L    Carbon Dioxide 33 (H) 21 - 31 mmol/L    Anion Gap 7 3 - 14 mmol/L    Glucose 220 (H) 70 - 105 mg/dL    Urea Nitrogen 16 7 - 25 mg/dL    Creatinine 0.65 0.60 - 1.20 mg/dL    GFR Estimate >90 >60 mL/min/[1.73_m2]    GFR Estimate If Black >90 >60 mL/min/[1.73_m2]    Calcium 9.3 8.6 - 10.3 mg/dL    Bilirubin Total 0.3 0.3 - 1.0 mg/dL    Albumin 4.3 3.5 - 5.7 g/dL    Protein Total 7.3 6.4 - 8.9 g/dL    Alkaline Phosphatase 72 34 - 104 U/L    ALT 15 7 - 52 U/L    AST 16 13 - 39 U/L       Medications   clindamycin (CLEOCIN) capsule 300 mg (300 mg Oral Given 7/9/21 2105)       Assessments & Plan (with Medical Decision Making)     I have reviewed the nursing notes.    I have reviewed the findings, diagnosis, plan and need for follow up with the patient.      New Prescriptions    CLINDAMYCIN (CLEOCIN) 300 MG CAPSULE    Take 1 capsule (300 mg) by mouth 3 times daily for 10 days       Final diagnoses:   Septic olecranon bursitis of right elbow   Elbow effusion, right     Afebrile.  Vital signs stable.  Sudden onset of significant olecranon bursal swelling with some slight warmth and  erythema.  CRP is normal.  ESR is pending.  Blood cultures are pending.  CMP is unremarkable.  CBC shows normal white blood cells no left shift.  Right elbow x-rays show Joint spaces are congruent. There is posterior soft tissue edema. There is a displaced anterior fat pad suggesting presence of a small to moderate-sized joint effusion. There is no evidence of fracture dislocation or significant osseous change.  Physical examination shows no signs of radial head fracture.  I discussed with patient the need for immobilization of this elbow.  I discussed my concerns for infection.  She was fitted in a sling.  She was given the first oral clindamycin paste according to her allergies.  Concerns for infection and septic olecranon bursitis of the right elbow.  Rx for clindamycin.  I discussed the need for close follow-up with her primary care provider in 3-5 days for further evaluation and reevaluation.  Follow-up sooner if there is any other concerns problems or questions    7/9/2021   [facility] AND [facility]            Riley Carvajal PA-C  07/09/21 [id number]

## 2021-07-10 NOTE — ED TRIAGE NOTES
"ED Nursing Triage Note (General)   ________________________________    Alicia Becker is a 65 year old Female that presents to triage private car  With history of  Right elbow pain and swelling that started today, pt denies any injuries, says she think it might be from a bug bite but she's not sure reported by patient   Significant symptoms had onset 13 hour(s) ago.  BP (!) 163/90   Pulse 94   Temp 98.8  F (37.1  C) (Tympanic)   Resp 20   Ht 1.6 m (5' 3\")   Wt 80.3 kg (177 lb 1.6 oz)   SpO2 92%   BMI 31.37 kg/m  t  Patient appears alert , in no acute distress., and cooperative behavior.    GCS Total = 15  Airway: intact  Breathing noted as Normal  Circulation Normal  Skin:  Normal  Action taken:  Triage to critical care immediately      PRE HOSPITAL PRIOR LIVING SITUATION Spouse  "

## 2021-08-14 ENCOUNTER — HEALTH MAINTENANCE LETTER (OUTPATIENT)
Age: 66
End: 2021-08-14

## 2021-09-17 ENCOUNTER — ALLIED HEALTH/NURSE VISIT (OUTPATIENT)
Dept: FAMILY MEDICINE | Facility: OTHER | Age: 66
End: 2021-09-17
Attending: FAMILY MEDICINE
Payer: COMMERCIAL

## 2021-09-17 DIAGNOSIS — Z20.822 COVID-19 RULED OUT: Primary | ICD-10-CM

## 2021-09-17 PROCEDURE — C9803 HOPD COVID-19 SPEC COLLECT: HCPCS

## 2021-09-17 PROCEDURE — U0005 INFEC AGEN DETEC AMPLI PROBE: HCPCS | Mod: ZL

## 2021-09-18 LAB — SARS-COV-2 RNA RESP QL NAA+PROBE: NEGATIVE

## 2021-10-20 ENCOUNTER — TRANSFERRED RECORDS (OUTPATIENT)
Dept: MULTI SPECIALTY CLINIC | Facility: CLINIC | Age: 66
End: 2021-10-20

## 2021-11-05 DIAGNOSIS — R26.89 BALANCE PROBLEMS: Primary | ICD-10-CM

## 2021-11-05 DIAGNOSIS — I77.6 VASCULITIS (H): ICD-10-CM

## 2021-12-01 ENCOUNTER — HOSPITAL ENCOUNTER (OUTPATIENT)
Dept: MRI IMAGING | Facility: OTHER | Age: 66
End: 2021-12-01
Attending: NURSE PRACTITIONER
Payer: COMMERCIAL

## 2021-12-01 DIAGNOSIS — M54.41 CHRONIC LOW BACK PAIN WITH RIGHT-SIDED SCIATICA: ICD-10-CM

## 2021-12-01 DIAGNOSIS — G89.29 CHRONIC LOW BACK PAIN WITH RIGHT-SIDED SCIATICA: ICD-10-CM

## 2021-12-01 DIAGNOSIS — R10.2 PAIN IN PELVIS: ICD-10-CM

## 2021-12-01 DIAGNOSIS — G89.29 OTHER CHRONIC PAIN: ICD-10-CM

## 2021-12-01 PROCEDURE — 72195 MRI PELVIS W/O DYE: CPT

## 2021-12-01 PROCEDURE — 72148 MRI LUMBAR SPINE W/O DYE: CPT

## 2021-12-07 ENCOUNTER — HOSPITAL ENCOUNTER (OUTPATIENT)
Dept: PHYSICAL THERAPY | Facility: OTHER | Age: 66
Setting detail: THERAPIES SERIES
End: 2021-12-07
Attending: NURSE PRACTITIONER
Payer: COMMERCIAL

## 2021-12-07 PROCEDURE — 97140 MANUAL THERAPY 1/> REGIONS: CPT | Mod: GP

## 2021-12-07 PROCEDURE — 97162 PT EVAL MOD COMPLEX 30 MIN: CPT | Mod: GP

## 2021-12-07 PROCEDURE — 97110 THERAPEUTIC EXERCISES: CPT | Mod: GP

## 2021-12-08 NOTE — PROGRESS NOTES
12/07/21 1400   General Information   Start of Care Date 12/07/21   Referring Physician MANOLO Murray   Orders Evaluate and Treat as Indicated   Additional Orders pt would like better balance, prevent AVN from progressing if possible.    Order Date 11/05/21   Medical Diagnosis R26.89, I77.6   Onset of illness/injury or Date of Surgery 07/01/21  (fall onto R side. )   Precautions/Limitations no known precautions/limitations   Surgical/Medical history reviewed Yes   Pertinent history of current problem (include personal factors and/or comorbidities that impact the POC) PMH: broken bones, blood disorders, circulation (vasculitis), diabetes, stroke, seizures, allergies, thyroid problems, HTN.    Prior level of function comment Pt lives in her own home with  and still drives, independent in her home with AFO on L foot. Pt reports breaking toe last winter and has not walked well since.    Diagnostic Tests MRI   MRI Results Results   MRI results AVN of R femoral head   Previous/Current Treatment Physical Therapy   Improvement after PT Significant   Current Community Support Family/friend caregiver   Patient role/Employment history Retired   Living environment House/[facility]   Home/Community Accessibility Comments pt lives in a home with stairs to enter with a railing,  is supportive.    Assistive Devices Comments L AFO   Patient/Family Goals Statement pt would like to decrease pain and improve overall walking abilties.    General Information Comments Pt is a 66 year old female referred to skilled PT services following an increase in R hip pain and back pain following a fall in July. Pt reports she broke her toe in february of this year and has not walked right since and needs work on her balance as well. the main thing Alicia is looking for are exercises to prevent surgery on her L hip due to AVN as long as possible and prevent future falling.    Fall Risk Screen   Fall screen completed by PT   Have you  fallen 2 or more times in the past year? Yes   Have you fallen and had an injury in the past year? Yes   Is patient a fall risk? Yes;Department fall risk interventions implemented   Fall screen comments Pt is at risk per Persaud balance test and pt report.    Abuse Screen (yes response referral indicated)   Feels Unsafe at Home or Work/School no   Feels Threatened by Someone no   Does Anyone Try to Keep You From Having Contact with Others or Doing Things Outside Your Home? no   Physical Signs of Abuse Present no   Pain   Patient currently in pain Yes   Pain location R hip    Pain rating 5/10   Pain description Ache;Throbbing   Pain description comment pt has avascular necrosis of R femoral head and history of back pain   Pain comments pt has had chronic back pain but R hip pain is new following fall.    Cognitive Status Examination   Orientation orientation to person, place and time   Level of Consciousness alert   Cognitive Comment pt is [place] impacting abiltiy to follow conversation and cues   Observation   Observation pt is very stiff and seems to be a little anxious, pt making comments on how talking fast can increase anxiety    Posture   Posture Protracted shoulders   Palpation   Palpation increased tissue tension in R hip flexors   Range of Motion (ROM)   ROM Comment decreased B hip motion R more than L in supine   Strength   Strength Comments B hip flexion 3-/5 (R due to pain, L due to post stroke), B hip ABD/ADD 4-/5, B knee extension 4/5, B knee flexion 4/5, R ankle dorsiflexion 4+/5   Musculoskeletal Special Tests   Musculoskeletal Special Tests VINAY/FADIR positive on R, negative knee ligament testing.    Bed Mobility   Bed Mobility Comments increased difficulty with supine to sitting due to back and hip pain   Transfer Skills   Transfer Comments increased UE assist to stand from chair.    Gait   Gait Comments increased lateral sway, decreased push off on L due to AFO, decreased stance time on R due to pain     Balance Special Tests Persaud Balance   Score out of 56 41/56   Comments increased difficulty with narrow stance and toe tapping.    Modality Interventions   Planned Modality Interventions Ultrasound;Cryotherapy;Thermotherapy: Hydrocollator Packs   Planned Modality Interventions Comments vasopneumatic compression   Planned Therapy Interventions   Planned Therapy Interventions balance training;gait training;joint mobilization;motor coordination training;neuromuscular re-education;ROM;strengthening;stretching;transfer training;manual therapy   Clinical Impression   Criteria for Skilled Therapeutic Interventions Met yes, treatment indicated   PT Diagnosis Gait instability, R LE pain   Influenced by the following impairments pain, weakenss, poor motor control, fatigue, L sided post stroke/AFO.    Functional limitations due to impairments gait instability, difficulty with transfers, difficulty with bed mobility, pain with static positions   Clinical Presentation Evolving/Changing   Clinical Presentation Rationale Pt has complex medical history with seizures and stroke effecting L side as well as AVN in R femoral head   Clinical Decision Making (Complexity) Moderate complexity   Therapy Frequency 2 times/Week   Predicted Duration of Therapy Intervention (days/wks) 12 weeks   Risk & Benefits of therapy have been explained Yes   Patient, Family & other staff in agreement with plan of care Yes   Clinical Impression Comments Pt is a 66 year old female referred to skilled PT services following an increase in R hip pain since a fall in July of 2021 and increased gait instability since a broken toe in February of 2021. Pt presents with avascular necrosis of the R femoral head with pt hoping to avoid surgery for as long as possible as well as L sided weakness due to history of strokes and seizures. Pt presents to PT with gait instability, LE weakness and pain indicating need for skilled PT services to decrease pt falls risk and  pain.    Education Assessment   Preferred Learning Style Demonstration;Reading   Barriers to Learning Hearing   GOALS   PT Eval Goals 1;2;3;4   Goal 1   Goal Identifier Balance   Goal Description pt will demonstrate ability to complete B SLS for 5 sec without UE assist to increase stability for gait.    Target Date 02/02/22   Goal 2   Goal Identifier Pain   Goal Description pt will report a 4/10 or less pain in R hip with gait to increase tolerance to walking   Target Date 02/16/22   Goal 3   Goal Identifier Endurance   Goal Description Pt will complete 4 minutes of gait on treadmill without rest break to increase tolerance to walking across a parking lot.    Target Date 01/05/22   Goal 4   Goal Identifier gait   Goal Description pt will ambulate without an AD for 200 feet with B step through pattern and B foot clearance.    Target Date 03/02/22   Total Evaluation Time   PT Wyatt, Moderate Complexity Minutes ([id number]) 30

## 2021-12-10 ENCOUNTER — HOSPITAL ENCOUNTER (OUTPATIENT)
Dept: PHYSICAL THERAPY | Facility: OTHER | Age: 66
Setting detail: THERAPIES SERIES
End: 2021-12-10
Attending: NURSE PRACTITIONER
Payer: COMMERCIAL

## 2021-12-10 PROCEDURE — 97112 NEUROMUSCULAR REEDUCATION: CPT | Mod: GP

## 2021-12-10 PROCEDURE — 97140 MANUAL THERAPY 1/> REGIONS: CPT | Mod: GP

## 2021-12-10 PROCEDURE — 97110 THERAPEUTIC EXERCISES: CPT | Mod: GP

## 2021-12-13 ENCOUNTER — HOSPITAL ENCOUNTER (OUTPATIENT)
Dept: PHYSICAL THERAPY | Facility: OTHER | Age: 66
Setting detail: THERAPIES SERIES
End: 2021-12-13
Attending: NURSE PRACTITIONER
Payer: COMMERCIAL

## 2021-12-13 PROCEDURE — 97110 THERAPEUTIC EXERCISES: CPT | Mod: GP | Performed by: PHYSICAL THERAPIST

## 2021-12-13 PROCEDURE — 97112 NEUROMUSCULAR REEDUCATION: CPT | Mod: GP | Performed by: PHYSICAL THERAPIST

## 2021-12-15 ENCOUNTER — HOSPITAL ENCOUNTER (OUTPATIENT)
Dept: PHYSICAL THERAPY | Facility: OTHER | Age: 66
Setting detail: THERAPIES SERIES
End: 2021-12-15
Attending: NURSE PRACTITIONER
Payer: COMMERCIAL

## 2021-12-15 PROCEDURE — 97110 THERAPEUTIC EXERCISES: CPT | Mod: GP

## 2021-12-15 PROCEDURE — 97112 NEUROMUSCULAR REEDUCATION: CPT | Mod: GP

## 2021-12-20 ENCOUNTER — HOSPITAL ENCOUNTER (OUTPATIENT)
Dept: PHYSICAL THERAPY | Facility: OTHER | Age: 66
Setting detail: THERAPIES SERIES
End: 2021-12-20
Attending: NURSE PRACTITIONER
Payer: COMMERCIAL

## 2021-12-20 PROCEDURE — 97110 THERAPEUTIC EXERCISES: CPT | Mod: GP

## 2021-12-20 PROCEDURE — 97112 NEUROMUSCULAR REEDUCATION: CPT | Mod: GP

## 2021-12-22 ENCOUNTER — HOSPITAL ENCOUNTER (OUTPATIENT)
Dept: PHYSICAL THERAPY | Facility: OTHER | Age: 66
Setting detail: THERAPIES SERIES
End: 2021-12-22
Attending: NURSE PRACTITIONER
Payer: COMMERCIAL

## 2021-12-22 PROCEDURE — 97110 THERAPEUTIC EXERCISES: CPT | Mod: GP

## 2021-12-22 PROCEDURE — 97112 NEUROMUSCULAR REEDUCATION: CPT | Mod: GP

## 2021-12-29 ENCOUNTER — HOSPITAL ENCOUNTER (OUTPATIENT)
Dept: PHYSICAL THERAPY | Facility: OTHER | Age: 66
Setting detail: THERAPIES SERIES
End: 2021-12-29
Attending: NURSE PRACTITIONER
Payer: COMMERCIAL

## 2021-12-29 LAB — ALT SERPL-CCNC: 21 IU/L (ref 6–31)

## 2021-12-29 PROCEDURE — 97110 THERAPEUTIC EXERCISES: CPT | Mod: GP

## 2022-01-19 ENCOUNTER — HOSPITAL ENCOUNTER (OUTPATIENT)
Dept: PHYSICAL THERAPY | Facility: OTHER | Age: 67
Setting detail: THERAPIES SERIES
End: 2022-01-19
Attending: NURSE PRACTITIONER
Payer: COMMERCIAL

## 2022-01-19 PROCEDURE — 97110 THERAPEUTIC EXERCISES: CPT | Mod: GP

## 2022-01-27 ENCOUNTER — HOSPITAL ENCOUNTER (OUTPATIENT)
Dept: PHYSICAL THERAPY | Facility: OTHER | Age: 67
Setting detail: THERAPIES SERIES
End: 2022-01-27
Attending: NURSE PRACTITIONER
Payer: COMMERCIAL

## 2022-01-27 PROCEDURE — 97110 THERAPEUTIC EXERCISES: CPT | Mod: GP

## 2022-01-29 ENCOUNTER — HEALTH MAINTENANCE LETTER (OUTPATIENT)
Age: 67
End: 2022-01-29

## 2022-02-03 ENCOUNTER — HOSPITAL ENCOUNTER (OUTPATIENT)
Dept: PHYSICAL THERAPY | Facility: OTHER | Age: 67
Setting detail: THERAPIES SERIES
End: 2022-02-03
Attending: NURSE PRACTITIONER
Payer: COMMERCIAL

## 2022-02-03 PROCEDURE — 97112 NEUROMUSCULAR REEDUCATION: CPT | Mod: GP

## 2022-02-03 PROCEDURE — 97110 THERAPEUTIC EXERCISES: CPT | Mod: GP

## 2022-02-03 NOTE — PROGRESS NOTES
[facility]    Outpatient Physical Therapy Progress Note  Patient: Alicia Becker  : 1955    Beginning/End Dates of Reporting Period:  21 to 2/3/22    Referring Provider: Dr. Murray      Therapy Diagnosis: Gait instability, R LE pain     Client Self Report: Pt reports she is doing well and is progressing well. Pt's  does have covid but is quarantining, pt will test after 3 days post exposure but is not having symptoms.     Goals:  Goal Identifier Balance   Goal Description pt will demonstrate ability to complete B SLS for 5 sec without UE assist to increase stability for gait.    Target Date 22   Date Met      Progress (detail required for progress note): Progressing, no UE assist on the R, still needed UE assist for the L.      Goal Identifier Pain   Goal Description pt will report a 4/10 or less pain in R hip with gait to increase tolerance to walking   Target Date 22   Date Met  21   Progress (detail required for progress note): MET, pt reports an 80% improvement in hip pain during standing and walking.      Goal Identifier Endurance   Goal Description Pt will complete 4 minutes of gait on treadmill without rest break to increase tolerance to walking across a parking lot.    Target Date 22   Date Met  21   Progress (detail required for progress note): MET, pt tolerating 4 min of walking without increase in pain or decline in walking quality.      Goal Identifier gait   Goal Description pt will ambulate without an AD for 200 feet with B step through pattern and B foot clearance.    Target Date 22   Date Met  22   Progress (detail required for progress note): MET, no foot drag on L and equal stance time, pt does still lateral weight shift.      Goal Identifier foot clearance   Goal Description Pt will complete 6, 4 inch hurldles with single leg step  over technique with SBA and no LOB x 1 to improve foot clerance for curb cuts and walking   Target Date 03/17/22   Date Met      Progress (detail required for progress note):  NEW GOAL     Goal Identifier Dynamic balance   Goal Description Pt will ambulate for 100 feet with horizontal head turning and no LOB or weaving outside of 1 foot to increase safety when walking in the community.    Target Date 03/31/22   Date Met      Progress (detail required for progress note):  NEW GOAL     Plan:  Continue therapy per current plan of care.    Discharge:  No

## 2022-02-08 ENCOUNTER — HOSPITAL ENCOUNTER (OUTPATIENT)
Dept: PHYSICAL THERAPY | Facility: OTHER | Age: 67
Setting detail: THERAPIES SERIES
End: 2022-02-08
Attending: NURSE PRACTITIONER
Payer: COMMERCIAL

## 2022-02-08 PROCEDURE — 97110 THERAPEUTIC EXERCISES: CPT | Mod: GP,CQ

## 2022-02-08 PROCEDURE — 97112 NEUROMUSCULAR REEDUCATION: CPT | Mod: GP,CQ

## 2022-02-10 ENCOUNTER — HOSPITAL ENCOUNTER (OUTPATIENT)
Dept: PHYSICAL THERAPY | Facility: OTHER | Age: 67
Setting detail: THERAPIES SERIES
End: 2022-02-10
Attending: NURSE PRACTITIONER
Payer: COMMERCIAL

## 2022-02-10 PROCEDURE — 97110 THERAPEUTIC EXERCISES: CPT | Mod: GP,CQ

## 2022-02-10 PROCEDURE — 97112 NEUROMUSCULAR REEDUCATION: CPT | Mod: GP,CQ

## 2022-02-16 ENCOUNTER — HOSPITAL ENCOUNTER (OUTPATIENT)
Dept: PHYSICAL THERAPY | Facility: OTHER | Age: 67
Setting detail: THERAPIES SERIES
End: 2022-02-16
Attending: NURSE PRACTITIONER
Payer: COMMERCIAL

## 2022-02-16 PROCEDURE — 97112 NEUROMUSCULAR REEDUCATION: CPT | Mod: GP,CQ

## 2022-02-16 PROCEDURE — 97110 THERAPEUTIC EXERCISES: CPT | Mod: GP,CQ

## 2022-02-18 ENCOUNTER — HOSPITAL ENCOUNTER (OUTPATIENT)
Dept: PHYSICAL THERAPY | Facility: OTHER | Age: 67
Setting detail: THERAPIES SERIES
End: 2022-02-18
Attending: NURSE PRACTITIONER
Payer: COMMERCIAL

## 2022-02-18 PROCEDURE — 97110 THERAPEUTIC EXERCISES: CPT | Mod: GP

## 2022-02-18 PROCEDURE — 97112 NEUROMUSCULAR REEDUCATION: CPT | Mod: GP

## 2022-02-24 ENCOUNTER — HOSPITAL ENCOUNTER (OUTPATIENT)
Dept: PHYSICAL THERAPY | Facility: OTHER | Age: 67
Setting detail: THERAPIES SERIES
End: 2022-02-24
Attending: NURSE PRACTITIONER
Payer: COMMERCIAL

## 2022-02-24 DIAGNOSIS — R79.89 LOW SERUM CORTISOL LEVEL: Primary | ICD-10-CM

## 2022-02-24 PROCEDURE — 97110 THERAPEUTIC EXERCISES: CPT | Mod: GP,CQ

## 2022-02-24 PROCEDURE — 97112 NEUROMUSCULAR REEDUCATION: CPT | Mod: GP,CQ

## 2022-02-24 RX ORDER — ALBUTEROL SULFATE 0.83 MG/ML
2.5 SOLUTION RESPIRATORY (INHALATION)
Status: CANCELLED | OUTPATIENT
Start: 2022-02-24

## 2022-02-24 RX ORDER — COSYNTROPIN 0.25 MG/ML
250 INJECTION, POWDER, FOR SOLUTION INTRAMUSCULAR; INTRAVENOUS ONCE
Status: CANCELLED
Start: 2022-02-24 | End: 2022-02-24

## 2022-02-24 RX ORDER — ALBUTEROL SULFATE 90 UG/1
1-2 AEROSOL, METERED RESPIRATORY (INHALATION)
Status: CANCELLED
Start: 2022-02-24

## 2022-02-24 RX ORDER — NALOXONE HYDROCHLORIDE 0.4 MG/ML
0.2 INJECTION, SOLUTION INTRAMUSCULAR; INTRAVENOUS; SUBCUTANEOUS
Status: CANCELLED | OUTPATIENT
Start: 2022-02-24

## 2022-02-24 RX ORDER — DIPHENHYDRAMINE HYDROCHLORIDE 50 MG/ML
50 INJECTION INTRAMUSCULAR; INTRAVENOUS
Status: CANCELLED
Start: 2022-02-24

## 2022-02-24 RX ORDER — METHYLPREDNISOLONE SODIUM SUCCINATE 125 MG/2ML
125 INJECTION, POWDER, LYOPHILIZED, FOR SOLUTION INTRAMUSCULAR; INTRAVENOUS
Status: CANCELLED
Start: 2022-02-24

## 2022-02-24 RX ORDER — EPINEPHRINE 1 MG/ML
0.3 INJECTION, SOLUTION, CONCENTRATE INTRAVENOUS EVERY 5 MIN PRN
Status: CANCELLED | OUTPATIENT
Start: 2022-02-24

## 2022-03-02 ENCOUNTER — HOSPITAL ENCOUNTER (OUTPATIENT)
Dept: PHYSICAL THERAPY | Facility: OTHER | Age: 67
Setting detail: THERAPIES SERIES
End: 2022-03-02
Attending: NURSE PRACTITIONER
Payer: COMMERCIAL

## 2022-03-02 PROCEDURE — 97110 THERAPEUTIC EXERCISES: CPT | Mod: GP,CQ

## 2022-03-02 PROCEDURE — 97112 NEUROMUSCULAR REEDUCATION: CPT | Mod: GP,CQ

## 2022-03-04 ENCOUNTER — HOSPITAL ENCOUNTER (OUTPATIENT)
Dept: PHYSICAL THERAPY | Facility: OTHER | Age: 67
Setting detail: THERAPIES SERIES
End: 2022-03-04
Attending: NURSE PRACTITIONER
Payer: COMMERCIAL

## 2022-03-04 PROCEDURE — 97110 THERAPEUTIC EXERCISES: CPT | Mod: GP

## 2022-03-04 PROCEDURE — 97112 NEUROMUSCULAR REEDUCATION: CPT | Mod: GP

## 2022-03-08 ENCOUNTER — HOSPITAL ENCOUNTER (OUTPATIENT)
Dept: PHYSICAL THERAPY | Facility: OTHER | Age: 67
Setting detail: THERAPIES SERIES
End: 2022-03-08
Attending: NURSE PRACTITIONER
Payer: COMMERCIAL

## 2022-03-08 PROCEDURE — 97110 THERAPEUTIC EXERCISES: CPT | Mod: GP

## 2022-03-08 PROCEDURE — 97112 NEUROMUSCULAR REEDUCATION: CPT | Mod: GP

## 2022-03-17 ENCOUNTER — HOSPITAL ENCOUNTER (OUTPATIENT)
Dept: PHYSICAL THERAPY | Facility: OTHER | Age: 67
Setting detail: THERAPIES SERIES
Discharge: HOME | End: 2022-03-17
Attending: NURSE PRACTITIONER
Payer: COMMERCIAL

## 2022-03-17 PROCEDURE — 97112 NEUROMUSCULAR REEDUCATION: CPT | Mod: GP

## 2022-03-17 PROCEDURE — 97110 THERAPEUTIC EXERCISES: CPT | Mod: GP

## 2022-03-24 ENCOUNTER — HOSPITAL ENCOUNTER (OUTPATIENT)
Dept: PHYSICAL THERAPY | Facility: OTHER | Age: 67
Setting detail: THERAPIES SERIES
Discharge: HOME | End: 2022-03-24
Attending: NURSE PRACTITIONER
Payer: COMMERCIAL

## 2022-03-24 PROCEDURE — 97112 NEUROMUSCULAR REEDUCATION: CPT | Mod: GP

## 2022-03-24 PROCEDURE — 97110 THERAPEUTIC EXERCISES: CPT | Mod: GP

## 2022-04-05 ENCOUNTER — MEDICAL CORRESPONDENCE (OUTPATIENT)
Dept: HEALTH INFORMATION MANAGEMENT | Facility: CLINIC | Age: 67
End: 2022-04-05
Payer: COMMERCIAL

## 2022-04-12 ENCOUNTER — HOSPITAL ENCOUNTER (EMERGENCY)
Facility: OTHER | Age: 67
Discharge: HOME | End: 2022-04-12
Attending: STUDENT IN AN ORGANIZED HEALTH CARE EDUCATION/TRAINING PROGRAM | Admitting: STUDENT IN AN ORGANIZED HEALTH CARE EDUCATION/TRAINING PROGRAM
Payer: COMMERCIAL

## 2022-04-12 ENCOUNTER — APPOINTMENT (OUTPATIENT)
Dept: GENERAL RADIOLOGY | Facility: OTHER | Age: 67
End: 2022-04-12
Attending: STUDENT IN AN ORGANIZED HEALTH CARE EDUCATION/TRAINING PROGRAM
Payer: COMMERCIAL

## 2022-04-12 VITALS
HEART RATE: 83 BPM | WEIGHT: 169 LBS | RESPIRATION RATE: 18 BRPM | TEMPERATURE: 97.5 F | SYSTOLIC BLOOD PRESSURE: 155 MMHG | OXYGEN SATURATION: 94 % | BODY MASS INDEX: 29.95 KG/M2 | HEIGHT: 63 IN | DIASTOLIC BLOOD PRESSURE: 84 MMHG

## 2022-04-12 DIAGNOSIS — M79.642 PAIN OF LEFT HAND: ICD-10-CM

## 2022-04-12 PROCEDURE — 99283 EMERGENCY DEPT VISIT LOW MDM: CPT | Performed by: STUDENT IN AN ORGANIZED HEALTH CARE EDUCATION/TRAINING PROGRAM

## 2022-04-12 PROCEDURE — 73130 X-RAY EXAM OF HAND: CPT | Mod: LT

## 2022-04-12 RX ORDER — FUROSEMIDE 20 MG
1 TABLET ORAL DAILY
COMMUNITY
Start: 2021-07-18

## 2022-04-12 RX ORDER — MYCOPHENOLATE MOFETIL 500 MG/1
1000 TABLET ORAL 2 TIMES DAILY
COMMUNITY
Start: 2022-04-05

## 2022-04-12 RX ORDER — ALENDRONATE SODIUM 70 MG/1
TABLET ORAL
COMMUNITY
Start: 2022-01-26

## 2022-04-12 NOTE — ED PROVIDER NOTES
History     Chief Complaint   Patient presents with     Hand Pain     Hand Injury     Left hand       Alicia Becker is a 66 year old female who presents with left hand pain. Pain started last evening after accidentally stepping backwards onto a toy dog bone and losing her balance landing on her left hand.  She has severe pain in her left metacarpal region that is worsened by any movement.  Currently rates pain 6/10 in severity.  Home Tylenol not helpful.  She cannot take NSAIDs.  Declines offer for small dose of narcotic pain medication here.  Denies numbness, weakness, head trauma, significant pain in any other surrounding joints.  She put a brace on her wrist that she had at home.  She is right-handed.    Allergies   Allergen Reactions     Ciprofloxacin      Other reaction(s): Seizures     Levofloxacin      Other reaction(s): Seizures     Quinolones      Other reaction(s): Seizures     Carbamazepine      Other reaction(s): Other - Describe In Comment Field  Ineffective for seizure control.     Carbamazepine Other (See Comments)     Ineffective for seizure control     Levetiracetam      Other reaction(s): Other - Describe In Comment Field  Ineffective at 750 mg BID for seizure control.      Lisinopril      Other reaction(s): Yeast Infection     Lorazepam Other (See Comments)     Needed so much to stop seizures that she couldn't function.    Needed so much to stop seizures that she couldn't function.       Niacin      Other reaction(s): *Unknown  Persistant flushing     Nsaids Other (See Comments)     Sulfa Drugs      Other reaction(s): *Unknown     Valproic Acid      Other reaction(s): Other - Describe In Comment Field  Ineffective for seizure control.        Patient Active Problem List    Diagnosis Date Noted     Low serum cortisol level (H) 02/24/2022     Priority: Medium     Pulmonary hypertension (H) 01/24/2019     Priority: Medium     Community acquired bacterial pneumonia 01/23/2019     Priority: Medium      Community acquired pneumonia, unspecified laterality 01/23/2019     Priority: Medium     Closed fracture of distal end of left radius with routine healing 12/27/2016     Priority: Medium     Cerebral hyponatremia 12/25/2016     Priority: Medium     Immunocompromised patient (H) 12/25/2016     Priority: Medium     Long term current use of systemic steroids 12/25/2016     Priority: Medium     Seizure disorder (H) 12/25/2016     Priority: Medium     Weakness of left side of body 12/23/2016     Priority: Medium     Closed Colles' fracture of left radius 12/21/2016     Priority: Medium     Community acquired pneumonia 06/07/2016     Priority: Medium     Fibrosis of lung (H) 06/07/2016     Priority: Medium     Hypoxia 06/07/2016     Priority: Medium     Ischemic stroke (H) 02/11/2016     Priority: Medium     Polyneuropathy in diseases classified elsewhere (H) 02/11/2016     Priority: Medium     Abnormal chest sounds 01/29/2015     Priority: Medium     Overview:   Overview:   Rhonchi heard on exam when patient is asymptomatic and has normal CXR.       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   BP at goal.   On losartan.   On ASA.   microalbumin 2015  A1C 6.9 2015  Eye Exam 10/2014: no retinopathy.  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 2015 - follow up pulm nodules - if stable no further chest images necessary.       Generalized seizure (H) 03/13/2012     Priority: Medium     Overview:   Overview:   Needs neurology follow up in March - July 2015 with new neurologist.       Essential (primary) hypertension 05/30/2008     Priority: Medium     Overview:   Overview:   IMO Update       Hypercholesterolemia 05/30/2008     Priority: Medium     Sleep apnea 05/30/2008     Priority: Medium       History reviewed. No pertinent past medical history.    Past Surgical History:   Procedure Laterality Date     AS FLEX SIGMOIDOSCOPY W BIOPSY  01/24/2014    [facility], results not in chart     COLONOSCOPY  02/03/2014    10 year fu 2/3/24       History reviewed. No pertinent family history.    Social History     Tobacco Use     Smoking status: Former Smoker     Smokeless tobacco: Never Used   Substance Use Topics     Alcohol use: No     Drug use: No     Comment: Drug use: No       Medications:    acetaminophen (TYLENOL) 500 MG tablet  albuterol (PROAIR HFA/PROVENTIL HFA/VENTOLIN HFA) 108 (90 Base) MCG/ACT inhaler  alendronate (FOSAMAX) 70 MG tablet  amLODIPine (NORVASC) 5 MG tablet  ascorbic acid (VITAMIN C) 500 MG tablet  aspirin 81 MG chewable tablet  augmented betamethasone dipropionate (DIPROLENE-AF) 0.05 % external cream  blood glucose (NO BRAND SPECIFIED) test strip  blood glucose monitoring (GOOD MICROLET) lancets  calcium carbonate-vitamin D (OYSTER SHELL CALCIUM/D) 500-200 MG-UNIT tablet  clotrimazole (LOTRIMIN) 1 % external cream  cyanocobalamin (VITAMIN B-12) 1000 MCG tablet  folic acid (FOLVITE) 1 MG tablet  furosemide (LASIX) 20 MG tablet  glipiZIDE (GLUCOTROL) 5 MG tablet  glucagon 1 MG kit  ipratropium  "- albuterol 0.5 mg/2.5 mg/3 mL (DUONEB) 0.5-2.5 (3) MG/3ML neb solution  lacosamide (VIMPAT) 200 MG TABS tablet  losartan (COZAAR) 100 MG tablet  metFORMIN (GLUCOPHAGE) 500 MG tablet  methotrexate 250 MG/10ML SOLN injection  montelukast (SINGULAIR) 10 MG tablet  multivitamin w/minerals (THERA-VIT-M) tablet  mycophenolate (GENERIC EQUIVALENT) 500 MG tablet  omega 3 1000 MG CAPS  OXcarbazepine (TRILEPTAL) 600 MG tablet  potassium chloride SA (K-DUR/KLOR-CON M) 20 MEQ CR tablet  predniSONE (DELTASONE) 1 MG tablet  predniSONE (DELTASONE) 5 MG tablet  Saline (SODIUM CHLORIDE) 0.65 % SOLN  simvastatin (ZOCOR) 20 MG tablet  sodium chloride 1 GM tablet  triamcinolone (KENALOG) 0.1 % external ointment  Tuberculin-Allergy Syringes 25G X 5/8\" 1 ML MISC  vitamin E (TOCOPHEROL) 400 units (180 mg) capsule      Review of Systems: See HPI for pertinent negatives and positives. All other systems reviewed and found to be negative.    Physical Exam   /83   Pulse 82   Temp 97.5  F (36.4  C) (Tympanic)   Resp 18   Ht 1.6 m (5' 3\")   Wt 76.7 kg (169 lb)   SpO2 93%   BMI 29.94 kg/m       General: awake, comfortable  HEENT: atraumatic  Respiratory: normal effort  Cardiovascular: Right radial and ulnar pulses 2+  Extremities: right metacarpals mildly tender without focal tenderness. Mild proximal dorsal hand swelling present.  Left wrist flexion and extension range of motion grossly normal, left  strength age-appropriate.  Left elbow with full range of motion and nontender. Wiggles left hand digits.   Skin: warm, dry, no rashes, skin intact, no discoloration  Neuro: alert, sensation intact throughout left hand  Psych: appropriate mood and affect    ED Course      ED Course as of 04/12/22 1248   Tue Apr 12, 2022   0839 She does complain of some mild neck pain now when she turns her head that is located over medial left trapezius that is reproducible with palpation. This appears like a muscular strain. No cervical spinal " tenderness.       Results for orders placed or performed during the hospital encounter of 04/12/22 (from the past 24 hour(s))   XR Hand Left 3 Views    Narrative    XR HAND LT G/E 3 VW    HISTORY: 66 years Female Diffuse metacarpal pain s/p fall yesterday    COMPARISON: None    TECHNIQUE: Left hand 3 views. Films were repeated due to technical  difficulty.    FINDINGS: There is a subtle lucency crossing the proximal aspect of  the fourth metacarpal seen on only one view. There is dorsal soft  tissue edema of the wrist. There is otherwise no evidence of fracture  or dislocation.    There is soft tissue edema about the fourth proximal interphalangeal  articulation. A small marginal erosion is seen at the radial aspect of  the base of the middle phalanx. Question an older healed erosion at  the same location on the fifth middle phalanx.    There is joint space narrowing of the fifth metacarpophalangeal  articulation.        Impression    IMPRESSION: Dorsal soft tissue edema of the wrist, questionable subtle  nondisplaced fracture at the base of the fourth metacarpal.    Arthritic changes and soft tissue edema suggesting possibility of an  inflammatory arthropathy such as rheumatoid arthritis, psoriatic  arthritis or gout.    MICHELE CABAN MD         SYSTEM ID:  [id number]       Medications - No data to display    Assessments & Plan (with Medical Decision Making)     I have reviewed nursing notes.    66 year old female evaluated for left hand pain after fall yesterday. Exam with mild left diffuse metacarpal tenderness and mild dorsal hand swelling. CMS intact. Adjacent joints unremarkable on exam. X-ray with questionable subtle base of fourth left metatarsal fracture.  Upon reexamination this area does have some tenderness.  With significant pain along with this mild swelling, suspect this is a fracture. Pre-fabricated splint applied. CMS remains intact afterwards. Orthopedic referral was placed. Pain treatment  declined per patient. Recommend ice, elevation, and tylenol as needed. A short course of outpatient narcotics was declined. Patient given instructions on follow-up and warning signs for which to return to ED. All questions were answered and the patient is comfortable with plan for discharge. The patient was discharged in stable condition.    I have reviewed the findings, diagnosis, plan and need for any follow up with the patient.    New Prescriptions    No medications on file       Final diagnoses:   Pain of left hand - Likely left base of 4th metacarpal fracture       4/12/2022   [facility]       Gerson Christy MD  04/12/22 1249       Gerson Christy MD  04/21/22 6516

## 2022-04-12 NOTE — ED TRIAGE NOTES
ED Nursing Triage Note (General)   ________________________________    Alicia Becker is a 66 year old Female that presents to triage private car  With history of  Last night cleaning some mirrors and stepped back and stepped on dogs toy bone and fell, started having some hand pain later and sat up placed ice and took tyelenol, and continued pain in left hand, brace on left hand reported by patient. Increased pain with activity, 8/10 and 6/10 with brace.  Significant symptoms had onset around 2030 last night.  Patient appears alert  and oriented, in no acute distress., and cooperative and calm behavior.  GCS Total = 15  Airway: intact  Breathing noted as Normal.  Circulation Normal  Skin normal, warm, dry  Action taken:  Triage to critical care immediately      PRE HOSPITAL PRIOR LIVING SITUATION Spouse

## 2022-04-12 NOTE — DISCHARGE INSTRUCTIONS
Your x-ray is concerning for a subtle fracture to the fourth bone in your left hand which connects to your ring finger.  An orthopedic referral was made and they will contact you to schedule appointment for follow-up. Wear splint until seen by orthopedics. Use Tylenol, elevation, and ice as needed.  Please review attached instructions including reasons to return to the emergency department.

## 2022-04-13 DIAGNOSIS — J44.9 COPD (CHRONIC OBSTRUCTIVE PULMONARY DISEASE) (H): Primary | ICD-10-CM

## 2022-04-13 DIAGNOSIS — G47.33 OSA (OBSTRUCTIVE SLEEP APNEA): ICD-10-CM

## 2022-04-25 ENCOUNTER — HOSPITAL ENCOUNTER (OUTPATIENT)
Dept: RESPIRATORY THERAPY | Facility: OTHER | Age: 67
Discharge: HOME | End: 2022-04-25
Payer: COMMERCIAL

## 2022-04-25 ENCOUNTER — OFFICE VISIT (OUTPATIENT)
Dept: FAMILY MEDICINE | Facility: OTHER | Age: 67
End: 2022-04-25
Attending: FAMILY MEDICINE
Payer: COMMERCIAL

## 2022-04-25 ENCOUNTER — HOSPITAL ENCOUNTER (OUTPATIENT)
Dept: GENERAL RADIOLOGY | Facility: OTHER | Age: 67
Discharge: HOME | End: 2022-04-25
Attending: FAMILY MEDICINE
Payer: COMMERCIAL

## 2022-04-25 VITALS
SYSTOLIC BLOOD PRESSURE: 136 MMHG | TEMPERATURE: 97.8 F | RESPIRATION RATE: 18 BRPM | OXYGEN SATURATION: 93 % | DIASTOLIC BLOOD PRESSURE: 74 MMHG | BODY MASS INDEX: 30.36 KG/M2 | HEART RATE: 103 BPM | WEIGHT: 171.4 LBS

## 2022-04-25 DIAGNOSIS — S63.502A SPRAIN OF LEFT WRIST, INITIAL ENCOUNTER: Primary | ICD-10-CM

## 2022-04-25 PROCEDURE — 94729 DIFFUSING CAPACITY: CPT

## 2022-04-25 PROCEDURE — 94726 PLETHYSMOGRAPHY LUNG VOLUMES: CPT | Mod: 26 | Performed by: INTERNAL MEDICINE

## 2022-04-25 PROCEDURE — 94729 DIFFUSING CAPACITY: CPT | Mod: 26 | Performed by: INTERNAL MEDICINE

## 2022-04-25 PROCEDURE — 94010 BREATHING CAPACITY TEST: CPT | Mod: 26 | Performed by: INTERNAL MEDICINE

## 2022-04-25 PROCEDURE — 94726 PLETHYSMOGRAPHY LUNG VOLUMES: CPT

## 2022-04-25 PROCEDURE — 73130 X-RAY EXAM OF HAND: CPT | Mod: LT

## 2022-04-25 PROCEDURE — 94010 BREATHING CAPACITY TEST: CPT

## 2022-04-25 PROCEDURE — 99213 OFFICE O/P EST LOW 20 MIN: CPT | Performed by: FAMILY MEDICINE

## 2022-04-25 RX ORDER — AMLODIPINE BESYLATE 5 MG/1
1 TABLET ORAL DAILY
COMMUNITY
Start: 2021-07-18

## 2022-04-25 RX ORDER — LOSARTAN POTASSIUM 100 MG/1
1 TABLET ORAL DAILY
COMMUNITY
Start: 2022-03-22

## 2022-04-25 RX ORDER — METHIMAZOLE 5 MG/1
5 TABLET ORAL DAILY
COMMUNITY
Start: 2022-01-21

## 2022-04-25 RX ORDER — METFORMIN HCL 500 MG
2000 TABLET, EXTENDED RELEASE 24 HR ORAL 2 TIMES DAILY WITH MEALS
Status: ON HOLD | COMMUNITY
Start: 2022-02-10 | End: 2023-02-17

## 2022-04-25 ASSESSMENT — PAIN SCALES - GENERAL: PAINLEVEL: MILD PAIN (2)

## 2022-04-25 NOTE — PROGRESS NOTES
HPI:  66-year-old female coming in for evaluation of left hand/wrist pain that occurred due to a fall on .  She tripped over a dog bone.  She was seen in the ER on .  There was concern for a fracture at the base of the fourth metacarpal.  She was placed in a short arm splint.  She comes in today with significant improvement in her pain.  No new injuries.  She has a history of an injury to the PIP joint of the fourth digit approximately 1 year ago.      EXAM:  /74 (BP Location: Right arm, Patient Position: Sitting, Cuff Size: Adult Regular)   Pulse 103   Temp 97.8  F (36.6  C) (Tympanic)   Resp 18   Wt 77.7 kg (171 lb 6.4 oz)   SpO2 93%   BMI 30.36 kg/m    MUSCULOSKELETAL EXAM:  LEFT HAND  Inspection:  -Slight prominence of the fourth PIP joint compared to the other joints of the fingers  -No bruising or swelling  -Scars:  None    Tenderness to palpation of the:  -Ulnar styloid:  Negative  -Distal radius:  Negative  -Ruddy's tubercle:  Negative  -Scapholunate ligament:  Negative  -Scaphoid in anatomical snuffbox:  Negative  -CMC joint:  Negative  -1st MCP joint:  Negative  -Metacarpals:  Negative    Range of Motion:  -Able to fully extend fingers  -Able to make full fist    Strength:  - strength:  5/5    Sensation:  -Intact to light touch in the radial, median, and ulnar nerve distributions    Motor:  -Intact AIN, PIN, and IO    Other:  -No signs of cyanosis. Normal skin temperature of the upper extremity.  -Elbow:  No gross deformity. Full range of motion.  -Right hand:  No gross deformity. No palpable tenderness. Normal strength and ROM.      IMAGIN2022: 3 view left hand x-ray  - There is a subtle lucency seen at the base of the fourth metacarpal on oblique view only.  There is an erosive change off of the radial aspect of the base of the middle phalanx of the fourth PIP joint with some associated soft tissue swelling.    2022: 3 view left hand x-ray  - Questionable fracture  at the base of the fourth metacarpal is not well visualized.  Stable finding at the fourth PIP joint.      ASSESSMENT/PLAN:  Diagnoses and all orders for this visit:  Sprain of left wrist, initial encounter  -     XR Hand Left G/E 3 Views  -     Orthopedic  Referral  -     Wrist/Arm/Hand Supplies Order for DME - ONLY FOR DME    66-year-old female with resolved pain about the left hand and wrist.  No tenderness to palpation throughout the exam.  No definitive bony abnormality seen on repeat radiographs.  X-rays from 4/12 and 4/25 were both personally viewed in the office with the findings as demonstrated above by my interpretation.  - Patient fit for a wrist brace which she can use on an as-needed basis  - Resume activities as tolerated  - Follow-up with return of pain or other symptoms she is unsure of      Jeferson Katz MD  4/25/2022  2:01 PM    Total time spent with this patient was 24 minutes which included chart review, visualization and interpretation of images, time spent with the patient, and documentation.

## 2022-04-25 NOTE — NURSING NOTE
"Chief Complaint   Patient presents with     Hand Injury     Left hand injury, DOI: 4/11/22     Patient presents for left hand injury. DOI: 4/11/22. Injured from stepping on a dog toy and falling. Pain 2/10. She is in a short arm splint at appointment today that was placed at the emergency department on 4/12/22    Initial /74 (BP Location: Right arm, Patient Position: Sitting, Cuff Size: Adult Regular)   Pulse 103   Temp 97.8  F (36.6  C) (Tympanic)   Resp 18   Wt 77.7 kg (171 lb 6.4 oz)   SpO2 93%   BMI 30.36 kg/m   Estimated body mass index is 30.36 kg/m  as calculated from the following:    Height as of 4/12/22: 1.6 m (5' 3\").    Weight as of this encounter: 77.7 kg (171 lb 6.4 oz).       Medication Reconciliation: Complete    Yoli Brown LPN .......  4/25/2022  1:33 PM   "

## 2022-05-04 LAB
ALBUMIN (URINE) MG/SPEC: 1.3 MG/DL
CREATININE (URINE): 59 MG/DL

## 2022-05-25 ENCOUNTER — TRANSFERRED RECORDS (OUTPATIENT)
Dept: HEALTH INFORMATION MANAGEMENT | Facility: CLINIC | Age: 67
End: 2022-05-25
Payer: COMMERCIAL

## 2022-05-26 NOTE — PROGRESS NOTES
[facility]    Outpatient Physical Therapy Discharge Note  Patient: Alicia Becker  : 1955    Beginning/End Dates of Reporting Period:  22 to 3/24/22    Referring Provider: MARK Beasley Diagnosis: Gait instability, R LE pain     Client Self Report: Pt reports she is getting random bloody noses, otherwise is doing fine. Pt reports she has called [facility] and pt will see about going there, also will walk with her friend. PT to keep chart open for 1 month after this session in case of decline.     Goals:  Goal Identifier Balance   Goal Description pt will demonstrate ability to complete B SLS for 5 sec without UE assist to increase stability for gait.    Target Date 22   Date Met      Progress (detail required for progress note): Progressing, no UE assist on the R, still needed UE assist for the L.      Goal Identifier Pain   Goal Description pt will report a 4/10 or less pain in R hip with gait to increase tolerance to walking   Target Date 22   Date Met  21   Progress (detail required for progress note): MET, pt reports an 80% improvement in hip pain during standing and walking.      Goal Identifier Endurance   Goal Description Pt will complete 4 minutes of gait on treadmill without rest break to increase tolerance to walking across a parking lot.    Target Date 22   Date Met  21   Progress (detail required for progress note): MET, pt tolerating 4 min of walking without increase in pain or decline in walking quality.      Goal Identifier gait   Goal Description pt will ambulate without an AD for 200 feet with B step through pattern and B foot clearance.    Target Date 22   Date Met  22   Progress (detail required for progress note): MET, no foot drag on L and equal stance time, pt does still lateral weight shift.      Goal Identifier foot  clearance   Goal Description Pt will complete 6, 4 inch hurldles with single leg step over technique with SBA and no LOB x 1 to improve foot clerance for curb cuts and walking   Target Date 03/17/22   Date Met  03/24/22   Progress (detail required for progress note): MET, pt able to complete without LOB and L foot leading     Goal Identifier Dynamic balance   Goal Description Pt will ambulate for 100 feet with horizontal head turning and no LOB or weaving outside of 1 foot to increase safety when walking in the community.    Target Date 03/24/22   Date Met      Progress (detail required for progress note): MET, no significant weaving or LOB.      Plan:  Discharge from therapy.    Discharge:    Reason for Discharge: Patient has met all goals.  No further expectation of progress.    Equipment Issued: none    Discharge Plan: Patient to continue home program.

## 2022-06-21 ENCOUNTER — OFFICE VISIT (OUTPATIENT)
Dept: OTOLARYNGOLOGY | Facility: OTHER | Age: 67
End: 2022-06-21
Attending: OTOLARYNGOLOGY
Payer: COMMERCIAL

## 2022-06-21 DIAGNOSIS — Z87.898 HISTORY OF EPISTAXIS: Primary | ICD-10-CM

## 2022-06-21 PROCEDURE — G0463 HOSPITAL OUTPT CLINIC VISIT: HCPCS

## 2022-06-21 NOTE — NURSING NOTE
Patient presents to the clinic today for recurrent nose bleeds.    Ana María Esqueda LPN.................. 6/21/2022 2:44 PM

## 2022-06-30 NOTE — PROGRESS NOTES
document embedded image  Patient Name: Alicia Becker    Address: [address] [place] [address] [address]     YOB: 1955    [place] [address]    MR Number: [id number]    Phone: [phone]  PCP: REFERRAL,SELF            Appointment Date: 06/21/22   Visit Provider: Klever Maria MD    cc: REFERRAL,SELF; ~    ENT Progress Note  Intake  Visit Reasons: recurrent nose bleeds / daily asprin    HPI  History of Present Illness  Chief complaint:  Recurrent epistaxis    History  The patient is a 66-year-old female with a history of previous strokes who takes daily aspirin.  She comes to the office today because of recurring nosebleeds.  It has been a month since she last bled.  Her last bleed occurred on the left side.     Exam  Nasal-there are no prominent vessels in Kiesselbach's plexus notable today.  There is no evidence of recent active bleed.  Oral cavity oropharynx, neck, head and neck integument-Clear  General the patient appears well and in no distress  Neuro-there are no focal cranial nerve deficits    A&P  Assessment & Plan  (1) History of epistaxis:        Status: Acute        Code(s):  Z87.898 - Personal history of other specified conditions  Patient was reassured that there is no evidence of neoplasm or threatening vessel that would benefit from further treatment at this time.  She was counseled on nose cares moving forward to try to minimize bleeds.  She will follow up as needed.      Klever Maria MD    06/21/22 1643    <Electronically signed by Klever Maria MD> 06/22/22 1100

## 2022-07-01 ENCOUNTER — OFFICE VISIT (OUTPATIENT)
Dept: FAMILY MEDICINE | Facility: OTHER | Age: 67
End: 2022-07-01
Attending: NURSE PRACTITIONER
Payer: COMMERCIAL

## 2022-07-01 ENCOUNTER — HOSPITAL ENCOUNTER (OUTPATIENT)
Dept: GENERAL RADIOLOGY | Facility: OTHER | Age: 67
Discharge: HOME | End: 2022-07-01
Attending: NURSE PRACTITIONER
Payer: COMMERCIAL

## 2022-07-01 VITALS
RESPIRATION RATE: 18 BRPM | HEIGHT: 63 IN | TEMPERATURE: 98.5 F | SYSTOLIC BLOOD PRESSURE: 138 MMHG | BODY MASS INDEX: 31.01 KG/M2 | HEART RATE: 83 BPM | DIASTOLIC BLOOD PRESSURE: 90 MMHG | OXYGEN SATURATION: 94 % | WEIGHT: 175 LBS

## 2022-07-01 DIAGNOSIS — M25.562 ACUTE PAIN OF LEFT KNEE: Primary | ICD-10-CM
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
## 2022-07-01 DIAGNOSIS — R53.1 WEAKNESS OF LEFT SIDE OF BODY: ICD-10-CM

## 2022-07-01 DIAGNOSIS — M25.562 ACUTE PAIN OF LEFT KNEE: ICD-10-CM

## 2022-07-01 PROCEDURE — 73562 X-RAY EXAM OF KNEE 3: CPT | Mod: LT

## 2022-07-01 PROCEDURE — 99203 OFFICE O/P NEW LOW 30 MIN: CPT | Performed by: NURSE PRACTITIONER

## 2022-07-01 RX ORDER — ASPIRIN 81 MG/1
81 TABLET, CHEWABLE ORAL DAILY
Status: ON HOLD | COMMUNITY
End: 2023-02-16

## 2022-07-01 RX ORDER — TRAMADOL HYDROCHLORIDE 50 MG/1
50 TABLET ORAL EVERY 6 HOURS PRN
Qty: 15 TABLET | Refills: 0 | Status: SHIPPED | OUTPATIENT
Start: 2022-07-01

## 2022-07-01 RX ORDER — MONTELUKAST SODIUM 10 MG/1
10 TABLET ORAL DAILY
COMMUNITY
Start: 2022-04-26

## 2022-07-01 RX ORDER — MUPIROCIN 20 MG/G
OINTMENT TOPICAL
COMMUNITY
Start: 2022-05-18

## 2022-07-01 ASSESSMENT — PAIN SCALES - GENERAL: PAINLEVEL: EXTREME PAIN (8)

## 2022-07-02 NOTE — NURSING NOTE
"Chief Complaint   Patient presents with     Knee Pain     Left     Patient is here for pain in her left knee that started a while back and worsened yesterday. Patient does not know of any injury that may have caused the pain. Patient takes Tylenol with little relief.     Initial BP (!) 138/90   Pulse 83   Temp 98.5  F (36.9  C) (Tympanic)   Resp 18   Ht 1.6 m (5' 3\")   Wt 79.4 kg (175 lb)   SpO2 94%   BMI 31.00 kg/m   Estimated body mass index is 31 kg/m  as calculated from the following:    Height as of this encounter: 1.6 m (5' 3\").    Weight as of this encounter: 79.4 kg (175 lb).  Medication Reconciliation: complete    Karine Valladares LPN  "

## 2022-07-02 NOTE — PROGRESS NOTES
"HPI:    Alicia Becker is a 66 year old female who presents to [facility] today for left knee pain. She has a history of falls, #4 in April and May, with most recent being early May. She denies any known injury but reports her left knee pain is worsening. She has taken Tylenol with no relief. She has tried ice, with no relief.    Reports left knee is achy    ROS:  Refer to HPI    BP (!) 138/90   Pulse 83   Temp 98.5  F (36.9  C) (Tympanic)   Resp 18   Ht 1.6 m (5' 3\")   Wt 79.4 kg (175 lb)   SpO2 94%   BMI 31.00 kg/m      EXAM:  General Appearance: Well appearing elderly woman, appropriate appearance for age. No acute distress  Musculoskeletal:  Equal movement of bilateral upper extremities.  Tenderness in medial aspect of left knee and lateral aspect of left knee. Tenderness behind left knee in soft tissue. Pain with straightening leg, pain with sit to stand, mild swelling in lateral and medial aspects of left knee.   Dermatological: no rashes noted of exposed skin  Psychological: normal affect, alert, oriented, and pleasant.     Diagnostics:  Results for orders placed or performed during the hospital encounter of 07/01/22   XR Knee Left 3 Views     Status: None    Narrative    PROCEDURE:  XR KNEE LEFT 3 VIEWS    HISTORY: Acute pain of left knee; Fall, initial encounter.    COMPARISON:  None.    TECHNIQUE:  3 views left knee.    FINDINGS:  No fracture or dislocation is identified. Patellofemoral  degenerative changes are noted. No foreign body is seen.       Impression    IMPRESSION: No acute fracture.      WANDER ZHANG MD         SYSTEM ID:  [id number]       ASSESSMENT/PLAN:  Alicia was seen today for knee pain.    Diagnoses and all orders for this visit:    Acute pain of left knee  -     XR Knee Left 3 Views; Future  -     traMADol (ULTRAM) 50 MG tablet; Take 1 tablet (50 mg) by mouth every 6 hours as needed for severe pain    Fall, initial encounter  -     XR Knee Left 3 Views; Future    Weakness " of left side of body        Discussed with patient viral vs bacterial respiratory illness, and evidence based practice and guidelines for cough without fever or infiltrate on xray are not indicative of pneumonia and should not be treated with antibiotics.    Discussed with patient that symptoms and exam are consistent with viral illness.  Discussed that symptomatic treatment of cough is appropriate but not with antibiotics.    Symptomatic treatment - Encouraged fluids, salt water gargles, honey, elevation, humidifier, sinus rinse/netti pot, hot tea, lozenges, etc.    May use over-the-counter Tylenol PRN for discomfort, swelling, or fever.  May use OTC topical diclofenac gel.  Use tramadol sparingly and only for severe pain.     Discussed warning signs/symptoms indicative of need to follow-up.  If not improving in one week, we discussed pursuing MRI for soft tissue evaluation.    Follow up with PCP or ED if symptoms persist or worsen or concerns.    I explained my diagnostic considerations and recommendations to the patient, who voiced understanding and agreement with the treatment plan. All questions were answered. We discussed potential side effects of any prescribed or recommended therapies, as well as expectations for response to treatments.    ANISH Singleton, AGNP-C  [facility]  07/01/2022 8:12 PM    Total time spent with this patient was 35 minutes which included chart review, visualization and interpretation of labs/images, time spent with the patient and documentation.    History reviewed. No pertinent past medical history.  Past Surgical History:   Procedure Laterality Date     AS FLEX SIGMOIDOSCOPY W BIOPSY  01/24/2014    [facility], results not in chart     COLONOSCOPY  02/03/2014    10 year fu 2/3/24     Social History     Tobacco Use     Smoking status: Former Smoker     Smokeless tobacco: Never Used   Substance Use Topics     Alcohol use: No     Current Outpatient Medications   Medication Sig  Dispense Refill     acetaminophen (TYLENOL) 500 MG tablet Take 2 tablets by mouth every night at bedtime, also may take 2 tablets by mouth every 6 hours as needed for pain. Max acetaminophen dose: 4000mg in 24 hrs.       albuterol (PROAIR HFA/PROVENTIL HFA/VENTOLIN HFA) 108 (90 Base) MCG/ACT inhaler Inhale 1-2 puffs into the lungs every 4 hours as needed for shortness of breath / dyspnea Shake before using.       alendronate (FOSAMAX) 70 MG tablet TAKE 1 TABLET BY MOUTH ONE TIME A WEEK. TAKE WITH PLAIN WATER IN THE MORNING       amLODIPine (NORVASC) 5 MG tablet Take 1 tablet by mouth daily       amLODIPine (NORVASC) 5 MG tablet Take 5 mg by mouth daily       ascorbic acid (VITAMIN C) 500 MG tablet Take 500 mg by mouth daily        aspirin (ASA) 81 MG chewable tablet Take 81 mg by mouth daily       aspirin 81 MG chewable tablet Take 81 mg by mouth daily With a meal       augmented betamethasone dipropionate (DIPROLENE-AF) 0.05 % external cream APPLY TOPICALLY ON LOWER LEGS BID FOR ONE WEEK ON THEN ONE WEEK OFF       blood glucose (NO BRAND SPECIFIED) test strip USE AS DIRECTED TESTING 3 TO 4 TIMES DAILY       blood glucose monitoring (GOOD MICROLET) lancets USE AS DIRECTED TESTING FOUR TIMES DAILY       calcium carbonate-vitamin D (OYSTER SHELL CALCIUM/D) 500-200 MG-UNIT tablet Take 1 tablet by mouth       clotrimazole (LOTRIMIN) 1 % external cream        cyanocobalamin (VITAMIN B-12) 1000 MCG tablet Take 1,000 mcg by mouth daily       folic acid (FOLVITE) 1 MG tablet Take 1 mg by mouth daily       furosemide (LASIX) 20 MG tablet Take 1 tablet by mouth in the morning.       glipiZIDE (GLUCOTROL) 5 MG tablet Take 2.5 mg by mouth daily       glucagon 1 MG kit Inject 1 mg into the muscle once As needed for low blood sugar       ipratropium - albuterol 0.5 mg/2.5 mg/3 mL (DUONEB) 0.5-2.5 (3) MG/3ML neb solution Inhale one neb by mouth once daily. When patient has a respiratory illness may increase to 2-3 times daily. 1  Box [address]     lacosamide (VIMPAT) 200 MG TABS tablet Take 200 mg by mouth 2 times daily       losartan (COZAAR) 100 MG tablet Take 1 tablet by mouth daily       losartan (COZAAR) 100 MG tablet Take 100 mg by mouth daily       metFORMIN (GLUCOPHAGE) 500 MG tablet Take by mouth 2 times daily (with meals) Take 2 tablets with breakfast and 1 with dinner       metFORMIN (GLUCOPHAGE-XR) 500 MG 24 hr tablet TAKE 2 TABLETS BY MOUTH TWICE DAILY WITH MEALS. SWALLOW TABLET WHOLE. DO NOT CRUSH DIVIDE OR CHEW       methimazole (TAPAZOLE) 5 MG tablet Take 5 mg by mouth daily       methotrexate 250 MG/10ML SOLN injection ADMINISTER 1 ML UNDER THE SKIN EVERY WEEK       montelukast (SINGULAIR) 10 MG tablet Take 10 mg by mouth daily       montelukast (SINGULAIR) 10 MG tablet Take 10 mg by mouth At Bedtime       multivitamin w/minerals (THERA-VIT-M) tablet Take 1 tablet by mouth daily       mupirocin (BACTROBAN) 2 % external ointment APPLY SPARINGLY TOPICALLY IN EACH NOSTRIL TWICE DAILY FOR 1 MONTH       mycophenolate (GENERIC EQUIVALENT) 500 MG tablet Take 1,000 mg by mouth       omega 3 1000 MG CAPS Take 1 capsule by mouth 3 times daily (with meals)       OXcarbazepine (TRILEPTAL) 600 MG tablet Take 2 tablets by mouth 2 times daily       potassium chloride SA (K-DUR/KLOR-CON M) 20 MEQ CR tablet Take 1 tablet by mouth 2 times daily (with meals) Do not crush.       predniSONE (DELTASONE) 1 MG tablet Take 3-4 capsules by mouth once daily with 5 mg tablet to equate either 8 mg or 9 mg once daily alternating every other day. Continue with taper as directed.       predniSONE (DELTASONE) 5 MG tablet Take 1 capsules by mouth once daily with 1 mg tablets to equate either 8 mg or 9 mg once daily alternating every other day. Continue with taper as directed.       Saline (SODIUM CHLORIDE) 0.65 % SOLN Spray 1 spray in nostril nightly as needed For nasal dryness       simvastatin (ZOCOR) 20 MG tablet Take 1 tablet by mouth At Bedtime       sodium  "chloride 1 GM tablet Take 4 tablets by mouth 3 times daily (with meals)       triamcinolone (KENALOG) 0.1 % external ointment        Tuberculin-Allergy Syringes 25G X 5/8\" 1 ML MISC USE ONE NEW SYRINGE WITH EACH INJECTION EVERY 7 DAYS       vitamin B-12 (CYANOCOBALAMIN) 1000 MCG tablet Take 500 mcg by mouth       vitamin E (TOCOPHEROL) 400 units (180 mg) capsule Take 400 Units by mouth daily        Allergies   Allergen Reactions     Ciprofloxacin      Other reaction(s): Seizures     Levofloxacin      Other reaction(s): Seizures     Quinolones      Other reaction(s): Seizures     Carbamazepine      Other reaction(s): Other - Describe In Comment Field  Ineffective for seizure control.     Carbamazepine Other (See Comments)     Ineffective for seizure control     Levetiracetam      Other reaction(s): Other - Describe In Comment Field  Ineffective at 750 mg BID for seizure control.      Lisinopril      Other reaction(s): Yeast Infection     Lorazepam Other (See Comments)     Needed so much to stop seizures that she couldn't function.    Needed so much to stop seizures that she couldn't function.       Niacin      Other reaction(s): *Unknown  Persistant flushing     Nsaids Other (See Comments)     Sulfa Drugs      Other reaction(s): *Unknown     Valproic Acid      Other reaction(s): Other - Describe In Comment Field  Ineffective for seizure control.        Past medical history, past surgical history, current medications and allergies reviewed and accurate to the best of my knowledge.      Nursing Notes:   Karine Valladares LPN  7/1/2022  7:06 PM  Signed  Chief Complaint   Patient presents with     Knee Pain     Left     Patient is here for pain in her left knee that started a while back and worsened yesterday. Patient does not know of any injury that may have caused the pain. Patient takes Tylenol with little relief.     Initial BP (!) 138/90   Pulse 83   Temp 98.5  F (36.9  C) (Tympanic)   Resp 18   Ht 1.6 m (5' 3\")  " " Wt 79.4 kg (175 lb)   SpO2 94%   BMI 31.00 kg/m   Estimated body mass index is 31 kg/m  as calculated from the following:    Height as of this encounter: 1.6 m (5' 3\").    Weight as of this encounter: 79.4 kg (175 lb).  Medication Reconciliation: complete    Karine Valladares LPN    "

## 2022-07-16 ENCOUNTER — HOSPITAL ENCOUNTER (OUTPATIENT)
Dept: MRI IMAGING | Facility: OTHER | Age: 67
Discharge: HOME | End: 2022-07-16
Attending: NURSE PRACTITIONER | Admitting: NURSE PRACTITIONER
Payer: COMMERCIAL

## 2022-07-16 DIAGNOSIS — M25.562 LEFT KNEE PAIN: ICD-10-CM

## 2022-07-16 DIAGNOSIS — G89.29 OTHER CHRONIC PAIN: ICD-10-CM

## 2022-07-16 PROCEDURE — 73721 MRI JNT OF LWR EXTRE W/O DYE: CPT | Mod: LT

## 2022-07-19 ENCOUNTER — APPOINTMENT (OUTPATIENT)
Dept: GENERAL RADIOLOGY | Facility: OTHER | Age: 67
End: 2022-07-19
Attending: FAMILY MEDICINE
Payer: COMMERCIAL

## 2022-07-19 PROCEDURE — 12001 RPR S/N/AX/GEN/TRNK 2.5CM/<: CPT | Performed by: EMERGENCY MEDICINE

## 2022-07-19 PROCEDURE — 73080 X-RAY EXAM OF ELBOW: CPT | Mod: TC,LT

## 2022-07-19 PROCEDURE — 99282 EMERGENCY DEPT VISIT SF MDM: CPT | Mod: 25 | Performed by: EMERGENCY MEDICINE

## 2022-07-19 PROCEDURE — 99284 EMERGENCY DEPT VISIT MOD MDM: CPT | Mod: 25 | Performed by: EMERGENCY MEDICINE

## 2022-07-20 ENCOUNTER — HOSPITAL ENCOUNTER (EMERGENCY)
Facility: OTHER | Age: 67
Discharge: HOME | End: 2022-07-20
Admitting: EMERGENCY MEDICINE
Payer: COMMERCIAL

## 2022-07-20 ENCOUNTER — APPOINTMENT (OUTPATIENT)
Dept: GENERAL RADIOLOGY | Facility: OTHER | Age: 67
End: 2022-07-20
Payer: COMMERCIAL

## 2022-07-20 VITALS
BODY MASS INDEX: 31.1 KG/M2 | HEART RATE: 79 BPM | DIASTOLIC BLOOD PRESSURE: 77 MMHG | WEIGHT: 169 LBS | HEIGHT: 62 IN | RESPIRATION RATE: 16 BRPM | SYSTOLIC BLOOD PRESSURE: 133 MMHG | OXYGEN SATURATION: 94 % | TEMPERATURE: 96.7 F

## 2022-07-20 DIAGNOSIS — S70.02XA CONTUSION OF LEFT HIP, INITIAL ENCOUNTER: ICD-10-CM

## 2022-07-20 DIAGNOSIS — S51.012A ELBOW LACERATION, LEFT, INITIAL ENCOUNTER: ICD-10-CM

## 2022-07-20 PROCEDURE — 73502 X-RAY EXAM HIP UNI 2-3 VIEWS: CPT | Mod: TC

## 2022-07-20 PROCEDURE — 99284 EMERGENCY DEPT VISIT MOD MDM: CPT | Mod: 25 | Performed by: EMERGENCY MEDICINE

## 2022-07-20 PROCEDURE — 12001 RPR S/N/AX/GEN/TRNK 2.5CM/<: CPT | Performed by: EMERGENCY MEDICINE

## 2022-07-20 RX ORDER — LIDOCAINE HYDROCHLORIDE AND EPINEPHRINE 10; 10 MG/ML; UG/ML
1 INJECTION, SOLUTION INFILTRATION; PERINEURAL ONCE
Status: DISCONTINUED | OUTPATIENT
Start: 2022-07-20 | End: 2022-07-20 | Stop reason: HOSPADM

## 2022-07-20 ASSESSMENT — ENCOUNTER SYMPTOMS: FATIGUE: 0

## 2022-07-20 NOTE — ED PROVIDER NOTES
History     Chief Complaint   Patient presents with     Elbow Pain     Laceration     Fall     HPI  66-year-old female with diabetes and history of weakness on left side of her body, chronic left knee pain which she wears a chronic lower extremity splint.  Went to step back when her dog jumped up and slipped and hit her left elbow and left hip on the threshold of the door.  She denies any back pain, neck pain or shortness of breath.  No abdominal pain.  she did sustain a small laceration to her elbow that was bleeding.  Bleeding is now controlled by bandage and pressure.  She did not hit her head, does not complain of any lower extremity pain.  She is having some left hip pain but able to ambulate without significant pain.  Patient was seen in room #6 with her .    Allergies:  Allergies   Allergen Reactions     Ciprofloxacin      Other reaction(s): Seizures     Levofloxacin      Other reaction(s): Seizures     Quinolones      Other reaction(s): Seizures     Carbamazepine      Other reaction(s): Other - Describe In Comment Field  Ineffective for seizure control.     Carbamazepine Other (See Comments)     Ineffective for seizure control     Levetiracetam      Other reaction(s): Other - Describe In Comment Field  Ineffective at 750 mg BID for seizure control.      Lisinopril      Other reaction(s): Yeast Infection     Lorazepam Other (See Comments)     Needed so much to stop seizures that she couldn't function.    Needed so much to stop seizures that she couldn't function.       Niacin      Other reaction(s): *Unknown  Persistant flushing     Nsaids Other (See Comments)     Sulfa Drugs      Other reaction(s): *Unknown     Valproic Acid      Other reaction(s): Other - Describe In Comment Field  Ineffective for seizure control.        Problem List:    Patient Active Problem List    Diagnosis Date Noted     Low serum cortisol level (H) 02/24/2022     Priority: Medium     Pulmonary hypertension (H) 01/24/2019      Priority: Medium     Community acquired bacterial pneumonia 01/23/2019     Priority: Medium     Community acquired pneumonia, unspecified laterality 01/23/2019     Priority: Medium     Closed fracture of distal end of left radius with routine healing 12/27/2016     Priority: Medium     Cerebral hyponatremia 12/25/2016     Priority: Medium     Immunocompromised patient (H) 12/25/2016     Priority: Medium     Long term current use of systemic steroids 12/25/2016     Priority: Medium     Seizure disorder (H) 12/25/2016     Priority: Medium     Weakness of left side of body 12/23/2016     Priority: Medium     Closed Colles' fracture of left radius 12/21/2016     Priority: Medium     Community acquired pneumonia 06/07/2016     Priority: Medium     Fibrosis of lung (H) 06/07/2016     Priority: Medium     Hypoxia 06/07/2016     Priority: Medium     Ischemic stroke (H) 02/11/2016     Priority: Medium     Polyneuropathy in diseases classified elsewhere (H) 02/11/2016     Priority: Medium     Abnormal chest sounds 01/29/2015     Priority: Medium     Overview:   Overview:   Rhonchi heard on exam when patient is asymptomatic and has normal CXR.       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   BP at goal.   On losartan.   On ASA.   microalbumin 2015  A1C 6.9 2015  Eye Exam 10/2014: no retinopathy.  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 2015 - follow up pulm nodules - if stable no further chest images necessary.       Generalized seizure (H) 03/13/2012     Priority: Medium     Overview:   Overview:   Needs neurology follow up in March - July 2015 with new neurologist.       Essential (primary) hypertension 05/30/2008     Priority: Medium     Overview:   Overview:   IMO Update       Hypercholesterolemia 05/30/2008     Priority: Medium     Sleep apnea 05/30/2008     Priority: Medium        Past Medical History:    No past medical history on file.    Past Surgical History:    Past Surgical History:   Procedure Laterality Date     [facility] F[facility]     COLONOSCOPY  02/03/2014    10 year fu 2/3/24       Family History:    No family history on file.    Social History:  Marital Status:   [2]  Social History     Tobacco Use     Smoking status: Former Smoker     Smokeless tobacco: Never Used   Vaping Use     Vaping Use: Never used   Substance Use Topics     Alcohol use: No     Drug use: No     Comment: Drug use: No        Medications:    acetaminophen (TYLENOL) 500 MG tablet  albuterol (PROAIR HFA/PROVENTIL HFA/VENTOLIN HFA) 108 (90 Base) MCG/ACT inhaler  alendronate (FOSAMAX) 70 MG tablet  amLODIPine (NORVASC) 5 MG tablet  amLODIPine (NORVASC) 5 MG tablet  ascorbic acid (VITAMIN C) 500 MG tablet  aspirin (ASA) 81 MG chewable tablet  aspirin 81 MG chewable tablet  augmented betamethasone dipropionate (DIPROLENE-AF) 0.05 % external cream  blood glucose (NO BRAND SPECIFIED) test strip  blood glucose monitoring (GOOD MICROLET) lancets  calcium  "carbonate-vitamin D (OYSTER SHELL CALCIUM/D) 500-200 MG-UNIT tablet  clotrimazole (LOTRIMIN) 1 % external cream  cyanocobalamin (VITAMIN B-12) 1000 MCG tablet  folic acid (FOLVITE) 1 MG tablet  furosemide (LASIX) 20 MG tablet  glipiZIDE (GLUCOTROL) 5 MG tablet  glucagon 1 MG kit  ipratropium - albuterol 0.5 mg/2.5 mg/3 mL (DUONEB) 0.5-2.5 (3) MG/3ML neb solution  lacosamide (VIMPAT) 200 MG TABS tablet  losartan (COZAAR) 100 MG tablet  losartan (COZAAR) 100 MG tablet  metFORMIN (GLUCOPHAGE) 500 MG tablet  metFORMIN (GLUCOPHAGE-XR) 500 MG 24 hr tablet  methimazole (TAPAZOLE) 5 MG tablet  methotrexate 250 MG/10ML SOLN injection  montelukast (SINGULAIR) 10 MG tablet  montelukast (SINGULAIR) 10 MG tablet  multivitamin w/minerals (THERA-VIT-M) tablet  mupirocin (BACTROBAN) 2 % external ointment  mycophenolate (GENERIC EQUIVALENT) 500 MG tablet  omega 3 1000 MG CAPS  OXcarbazepine (TRILEPTAL) 600 MG tablet  potassium chloride SA (K-DUR/KLOR-CON M) 20 MEQ CR tablet  predniSONE (DELTASONE) 1 MG tablet  predniSONE (DELTASONE) 5 MG tablet  Saline (SODIUM CHLORIDE) 0.65 % SOLN  simvastatin (ZOCOR) 20 MG tablet  sodium chloride 1 GM tablet  traMADol (ULTRAM) 50 MG tablet  triamcinolone (KENALOG) 0.1 % external ointment  Tuberculin-Allergy Syringes 25G X 5/8\" 1 ML MISC  vitamin B-12 (CYANOCOBALAMIN) 1000 MCG tablet  vitamin E (TOCOPHEROL) 400 units (180 mg) capsule          Review of Systems   Constitutional: Negative for fatigue.   All other systems reviewed and are negative.      Physical Exam   BP: (!) 157/87  Pulse: 89  Temp: 97.6  F (36.4  C)  Resp: 18  Height: 157.5 cm (5' 2\")  Weight: 76.7 kg (169 lb)  SpO2: 95 %      Physical Exam  Vitals and nursing note reviewed. Exam conducted with a chaperone present.   Constitutional:       Appearance: Normal appearance.   HENT:      Head: Normocephalic.   Eyes:      Extraocular Movements: Extraocular movements intact.      Pupils: Pupils are equal, round, and reactive to light. "   Cardiovascular:      Rate and Rhythm: Normal rate and regular rhythm.      Pulses: Normal pulses.      Heart sounds: Normal heart sounds.   Pulmonary:      Effort: Pulmonary effort is normal.      Breath sounds: Normal breath sounds.   Abdominal:      General: Abdomen is flat. Bowel sounds are normal.      Palpations: Abdomen is soft.   Musculoskeletal:         General: Swelling, tenderness and signs of injury present. Normal range of motion.      Right elbow: Normal.      Left elbow: Laceration present.        Arms:       Cervical back: Normal range of motion and neck supple.        Legs:       Comments: Very small .3cm at edge of olecranon.  Good range of motion of the elbow however.    Mild tenderness to the left hip.  With range of motion she has increasing pain as well as internal and external rotation leg is not shortened or internally or externally rotated.   Skin:     General: Skin is warm.   Neurological:      General: No focal deficit present.      Mental Status: She is alert and oriented to person, place, and time.   Psychiatric:         Mood and Affect: Mood normal.         Behavior: Behavior normal.         ED Course   66-year-old female with history of diabetes and left-sided weakness.  X-ray of elbow as well as hip do not show any bony abnormality.  CMS is intact.  She has no shoulder or wrist pain.  She has no ankle pain on the left.  She does have chronic left knee pain but this is not worse at this time.  Left elbow small laceration was repaired easily.  Patient was able to ambulate in department without significant pain in her hip.  She will follow-up with her PMD for suture removal in 7 to 10 days.  Tetanus is up-to-date as it was in 2020.  Patient without any other significant complaints including abdominal pain and lower back pain.  Patient and  appear to understand discharge instructions as well as return precautions.              [facility]    Date/Time: 7/20/2022 1:53 AM  Performed by: Juanita Iglesias DO  Authorized by: Juanita Iglesias DO     Risks, benefits and alternatives discussed.      ANESTHESIA (see MAR for exact dosages):     Anesthesia method:  Local infiltration    Local anesthetic:  Lidocaine 1% WITH epi  LACERATION DETAILS     Location:  Shoulder/arm    Shoulder/arm location:  L elbow    Length (cm):  0.3    Depth (mm):  3    REPAIR TYPE:     Repair type:  Simple      EXPLORATION:     Hemostasis achieved with:  Epinephrine    Wound exploration: wound explored through full range of motion and entire depth of wound probed and visualized      Wound extent: no nerve damage, no tendon damage and no underlying fracture      Contaminated: no      TREATMENT:     Area cleansed with:  Shur-Clens and soap and water    Amount of cleaning:  Standard    Irrigation solution:  Sterile water    Irrigation method:  Syringe    Visualized foreign bodies/material removed: no      SKIN REPAIR     Repair method:  Sutures    Suture size:  4-0    Suture material:  Nylon    Number of sutures:  1    APPROXIMATION     Approximation:  Loose    POST-PROCEDURE DETAILS     Dressing:  Antibiotic ointment and non-adherent dressing        PROCEDURE  Describe Procedure: Simple kerlex wrap as well.  Patient Tolerance:  Patient tolerated the procedure well with no immediate complications                  Results for orders placed or performed during the hospital encounter of 07/20/22 (from the past 24 hour(s))   XR Elbow Left G/E 3 Views    Narrative    PROCEDURE INFORMATION:   Exam: XR Left Elbow   Exam date and time: 7/19/2022 11:26 PM   Age: 66 years old   Clinical indication: Injury or trauma; Fall; Laceration; Elbow; Left;   Additional info: Pain after fall     TECHNIQUE:   Imaging protocol: Radiologic exam of the Left elbow.   Views: 3 or more views.     COMPARISON:   No relevant prior studies available.     FINDINGS:   Bones/joints: There is a corticated  bone fragment adjacent to the olecranon   which is consistent with an old fracture. No acute fracture or dislocation.  Soft tissues: Severe soft tissue edema overlies the olecranon. Multiple calcium   density foreign bodies are seen overlying the elbow and proximal forearm. Many   of these look like vascular calcifications. Recommend clinical correlation to   exclude trauma related soft tissue foreign body.       Impression    IMPRESSION:   Possible trauma related foreign body as described above.    No acute fracture or dislocation.    THIS DOCUMENT HAS BEEN ELECTRONICALLY SIGNED BY NAY SOTOMAYOR MD   XR Pelvis and Hip Left 2 Views    Narrative    PROCEDURE INFORMATION:   Exam: XR Left Hip   Exam date and time: 7/20/2022 1:11 AM   Age: 66 years old   Clinical indication: Injury or trauma; Fall; Sprain or strain; Left; Hip;   Additional info: L hip pain after a fall     TECHNIQUE:   Imaging protocol: Radiologic exam of the Left hip.   Views: 2 or 3 views hip with pelvis when performed.     COMPARISON:   MR PELVIC BONES WO CONTRAST 12/1/2021 4:03 PM     FINDINGS:   Bones/joints: Mild degenerative change of the hip joint with narrowing of the   joint space and acetabular overgrowth. No fracture or dislocation.  Soft tissues: Unremarkable.       Impression    IMPRESSION:   Mild degenerative changes as described above.    No fracture.    If patient's pain continues recommend correlation with MRI as an outpatient.    THIS DOCUMENT HAS BEEN ELECTRONICALLY SIGNED BY NAY SOTOMAYOR MD       Medications - No data to display    Assessments & Plan (with Medical Decision Making)     I have reviewed the nursing notes.    I have reviewed the findings, diagnosis, plan and need for follow up with the patient.      Discharge Medication List as of 7/20/2022  1:58 AM          Final diagnoses:   Elbow laceration, left, initial encounter   Contusion of left hip, initial encounter       7/19/2022   [facility] AND  hospitalsJuanita DO  07/20/22 0734

## 2022-07-20 NOTE — ED TRIAGE NOTES
Pt was walking into door when dog came out running at pt, pt stepped backwards and tripped, landing on left elbow, lac to elbow. Also complains of left knee pain, states has torn meniscus and fall made pain worse.      Triage Assessment     Row Name 07/19/22 [id number]       Triage Assessment (Adult)    Airway WDL WDL       Respiratory WDL    Respiratory WDL WDL       Skin Circulation/Temperature WDL    Skin Circulation/Temperature WDL X  lac to left elbow       Peripheral/Neurovascular WDL    Peripheral Neurovascular WDL WDL       Cognitive/Neuro/Behavioral WDL    Cognitive/Neuro/Behavioral WDL WDL

## 2022-07-20 NOTE — DISCHARGE INSTRUCTIONS
Neosporin to wound twice/day. Ice and simple compression for swelling. May take tylenol 1000mg every 6-8 hours as needed for pain. Watch closely for infection on your elbow.   Have a good rest of your week!

## 2022-07-20 NOTE — ED NOTES
Discharge instructions and education reviewed with patient. Extra dsg change supplies given to patient and instructed on use. VSS, pt is ambulatory

## 2022-08-03 DIAGNOSIS — R09.02 HYPOXIA: Primary | ICD-10-CM

## 2022-08-04 ENCOUNTER — TRANSFERRED RECORDS (OUTPATIENT)
Dept: MULTI SPECIALTY CLINIC | Facility: CLINIC | Age: 67
End: 2022-08-04

## 2022-08-04 LAB
CREATININE (EXTERNAL): 0.64 MG/DL (ref 0.4–1)
GFR ESTIMATED (EXTERNAL): >60 ML/MIN/1.73M2
GLUCOSE (EXTERNAL): 144 MG/DL (ref 70–99)
HBA1C MFR BLD: 6.5 % (ref 4–5.6)
POTASSIUM (EXTERNAL): 3.8 MEQ/L (ref 3.4–5.1)

## 2022-08-16 ENCOUNTER — HOSPITAL ENCOUNTER (OUTPATIENT)
Dept: RESPIRATORY THERAPY | Facility: OTHER | Age: 67
Discharge: HOME | End: 2022-08-16
Attending: FAMILY MEDICINE | Admitting: FAMILY MEDICINE
Payer: COMMERCIAL

## 2022-08-16 DIAGNOSIS — R09.02 HYPOXIA: ICD-10-CM

## 2022-08-16 PROCEDURE — 94762 N-INVAS EAR/PLS OXIMTRY CONT: CPT | Performed by: FAMILY MEDICINE

## 2022-08-16 PROCEDURE — 999N000157 HC STATISTIC RCP TIME EA 10 MIN

## 2022-09-17 ENCOUNTER — HEALTH MAINTENANCE LETTER (OUTPATIENT)
Age: 67
End: 2022-09-17

## 2022-09-22 LAB — RETINOPATHY: NORMAL

## 2023-02-14 LAB
ALBUMIN (URINE) MG/SPEC: 1.5 MG/DL
ALBUMIN/CREATININE RATIO: 28 (ref 0–29)
CREATININE (URINE): 53 MG/DL
HBA1C MFR BLD: 7.3 % (ref 4–5.6)
TSH SERPL-ACNC: 0.25 UIU/ML (ref 0.4–3.99)
TSH SERPL-ACNC: 0.25 UIU/ML (ref 0.4–3.99)

## 2023-02-16 ENCOUNTER — OFFICE VISIT (OUTPATIENT)
Dept: FAMILY MEDICINE | Facility: OTHER | Age: 68
End: 2023-02-16
Payer: COMMERCIAL

## 2023-02-16 ENCOUNTER — HOSPITAL ENCOUNTER (OUTPATIENT)
Dept: CT IMAGING | Facility: OTHER | Age: 68
Discharge: HOME | End: 2023-02-16
Payer: COMMERCIAL

## 2023-02-16 ENCOUNTER — ANESTHESIA (OUTPATIENT)
Dept: SURGERY | Facility: OTHER | Age: 68
End: 2023-02-16
Payer: COMMERCIAL

## 2023-02-16 ENCOUNTER — ANESTHESIA EVENT (OUTPATIENT)
Dept: SURGERY | Facility: OTHER | Age: 68
End: 2023-02-16
Payer: COMMERCIAL

## 2023-02-16 ENCOUNTER — HOSPITAL ENCOUNTER (OUTPATIENT)
Facility: OTHER | Age: 68
Setting detail: OBSERVATION
Discharge: HOME | End: 2023-02-17
Attending: INTERNAL MEDICINE | Admitting: INTERNAL MEDICINE
Payer: COMMERCIAL

## 2023-02-16 VITALS
HEART RATE: 98 BPM | SYSTOLIC BLOOD PRESSURE: 120 MMHG | HEIGHT: 63 IN | DIASTOLIC BLOOD PRESSURE: 52 MMHG | BODY MASS INDEX: 31.54 KG/M2 | TEMPERATURE: 98.5 F | RESPIRATION RATE: 24 BRPM | OXYGEN SATURATION: 95 % | WEIGHT: 178 LBS

## 2023-02-16 DIAGNOSIS — K35.30 ACUTE APPENDICITIS WITH LOCALIZED PERITONITIS, UNSPECIFIED WHETHER ABSCESS PRESENT, UNSPECIFIED WHETHER GANGRENE PRESENT, UNSPECIFIED WHETHER PERFORATION PRESENT: Primary | ICD-10-CM

## 2023-02-16 DIAGNOSIS — K35.80 ACUTE APPENDICITIS, UNSPECIFIED ACUTE APPENDICITIS TYPE: ICD-10-CM

## 2023-02-16 DIAGNOSIS — R10.31 ABDOMINAL PAIN, RIGHT LOWER QUADRANT: Primary | ICD-10-CM

## 2023-02-16 DIAGNOSIS — K35.30 ACUTE APPENDICITIS WITH LOCALIZED PERITONITIS, WITHOUT PERFORATION, ABSCESS, OR GANGRENE: ICD-10-CM

## 2023-02-16 LAB
ALBUMIN SERPL BCG-MCNC: 4.6 G/DL (ref 3.5–5.2)
ALBUMIN UR-MCNC: NEGATIVE MG/DL
ALP SERPL-CCNC: 66 U/L (ref 35–104)
ALT SERPL W P-5'-P-CCNC: 20 U/L (ref 10–35)
AMYLASE SERPL-CCNC: 51 U/L (ref 28–100)
ANION GAP SERPL CALCULATED.3IONS-SCNC: 8 MMOL/L (ref 7–15)
APPEARANCE UR: CLEAR
AST SERPL W P-5'-P-CCNC: 17 U/L (ref 10–35)
BASOPHILS # BLD AUTO: 0 10E3/UL (ref 0–0.2)
BASOPHILS NFR BLD AUTO: 0 %
BILIRUB SERPL-MCNC: 0.3 MG/DL
BILIRUB UR QL STRIP: NEGATIVE
BUN SERPL-MCNC: 13.3 MG/DL (ref 8–23)
CALCIUM SERPL-MCNC: 8.9 MG/DL (ref 8.8–10.2)
CHLORIDE SERPL-SCNC: 95 MMOL/L (ref 98–107)
COLOR UR AUTO: YELLOW
CREAT SERPL-MCNC: 0.43 MG/DL (ref 0.51–0.95)
CRP SERPL-MCNC: 12.97 MG/L
DEPRECATED HCO3 PLAS-SCNC: 33 MMOL/L (ref 22–29)
EOSINOPHIL # BLD AUTO: 0 10E3/UL (ref 0–0.7)
EOSINOPHIL NFR BLD AUTO: 0 %
ERYTHROCYTE [DISTWIDTH] IN BLOOD BY AUTOMATED COUNT: 13.3 % (ref 10–15)
GFR SERPL CREATININE-BSD FRML MDRD: >90 ML/MIN/1.73M2
GLUCOSE BLDC GLUCOMTR-MCNC: 139 MG/DL (ref 70–99)
GLUCOSE BLDC GLUCOMTR-MCNC: 197 MG/DL (ref 70–99)
GLUCOSE SERPL-MCNC: 238 MG/DL (ref 70–99)
GLUCOSE UR STRIP-MCNC: 500 MG/DL
HCT VFR BLD AUTO: 38.5 % (ref 35–47)
HGB BLD-MCNC: 12.9 G/DL (ref 11.7–15.7)
HGB UR QL STRIP: NEGATIVE
HOLD SPECIMEN: NORMAL
IMM GRANULOCYTES # BLD: 0 10E3/UL
IMM GRANULOCYTES NFR BLD: 0 %
KETONES UR STRIP-MCNC: NEGATIVE MG/DL
LACTATE SERPL-SCNC: 1.8 MMOL/L (ref 0.7–2)
LEUKOCYTE ESTERASE UR QL STRIP: NEGATIVE
LIPASE SERPL-CCNC: 23 U/L (ref 13–60)
LYMPHOCYTES # BLD AUTO: 0.5 10E3/UL (ref 0.8–5.3)
LYMPHOCYTES NFR BLD AUTO: 3 %
MCH RBC QN AUTO: 34 PG (ref 26.5–33)
MCHC RBC AUTO-ENTMCNC: 33.5 G/DL (ref 31.5–36.5)
MCV RBC AUTO: 102 FL (ref 78–100)
MONOCYTES # BLD AUTO: 0.8 10E3/UL (ref 0–1.3)
MONOCYTES NFR BLD AUTO: 6 %
NEUTROPHILS # BLD AUTO: 12.9 10E3/UL (ref 1.6–8.3)
NEUTROPHILS NFR BLD AUTO: 91 %
NITRATE UR QL: NEGATIVE
NRBC # BLD AUTO: 0 10E3/UL
NRBC BLD AUTO-RTO: 0 /100
PH UR STRIP: 5.5 [PH] (ref 5–9)
PLATELET # BLD AUTO: 193 10E3/UL (ref 150–450)
POTASSIUM SERPL-SCNC: 3.6 MMOL/L (ref 3.4–5.3)
PROT SERPL-MCNC: 7.6 G/DL (ref 6.4–8.3)
RBC # BLD AUTO: 3.79 10E6/UL (ref 3.8–5.2)
RBC URINE: 1 /HPF
SODIUM SERPL-SCNC: 136 MMOL/L (ref 136–145)
SP GR UR STRIP: 1.01 (ref 1–1.03)
UROBILINOGEN UR STRIP-MCNC: NORMAL MG/DL
WBC # BLD AUTO: 14.3 10E3/UL (ref 4–11)
WBC URINE: <1 /HPF

## 2023-02-16 PROCEDURE — 250N000025 HC SEVOFLURANE, PER MIN: Performed by: SURGERY

## 2023-02-16 PROCEDURE — 250N000009 HC RX 250: Performed by: NURSE ANESTHETIST, CERTIFIED REGISTERED

## 2023-02-16 PROCEDURE — 82962 GLUCOSE BLOOD TEST: CPT

## 2023-02-16 PROCEDURE — 99223 1ST HOSP IP/OBS HIGH 75: CPT | Performed by: INTERNAL MEDICINE

## 2023-02-16 PROCEDURE — 88304 TISSUE EXAM BY PATHOLOGIST: CPT

## 2023-02-16 PROCEDURE — 250N000013 HC RX MED GY IP 250 OP 250 PS 637: Performed by: INTERNAL MEDICINE

## 2023-02-16 PROCEDURE — 36415 COLL VENOUS BLD VENIPUNCTURE: CPT | Performed by: INTERNAL MEDICINE

## 2023-02-16 PROCEDURE — 999N000157 HC STATISTIC RCP TIME EA 10 MIN

## 2023-02-16 PROCEDURE — 85025 COMPLETE CBC W/AUTO DIFF WBC: CPT | Mod: ZL

## 2023-02-16 PROCEDURE — 272N000001 HC OR GENERAL SUPPLY STERILE: Performed by: SURGERY

## 2023-02-16 PROCEDURE — 83605 ASSAY OF LACTIC ACID: CPT | Performed by: INTERNAL MEDICINE

## 2023-02-16 PROCEDURE — G0378 HOSPITAL OBSERVATION PER HR: HCPCS

## 2023-02-16 PROCEDURE — 250N000011 HC RX IP 250 OP 636

## 2023-02-16 PROCEDURE — 44970 LAPAROSCOPY APPENDECTOMY: CPT | Performed by: NURSE ANESTHETIST, CERTIFIED REGISTERED

## 2023-02-16 PROCEDURE — 258N000003 HC RX IP 258 OP 636: Performed by: INTERNAL MEDICINE

## 2023-02-16 PROCEDURE — 86140 C-REACTIVE PROTEIN: CPT | Mod: ZL

## 2023-02-16 PROCEDURE — 250N000011 HC RX IP 250 OP 636: Performed by: SURGERY

## 2023-02-16 PROCEDURE — 74177 CT ABD & PELVIS W/CONTRAST: CPT

## 2023-02-16 PROCEDURE — 250N000012 HC RX MED GY IP 250 OP 636 PS 637: Performed by: INTERNAL MEDICINE

## 2023-02-16 PROCEDURE — 360N000076 HC SURGERY LEVEL 3, PER MIN: Performed by: SURGERY

## 2023-02-16 PROCEDURE — 80053 COMPREHEN METABOLIC PANEL: CPT | Mod: ZL

## 2023-02-16 PROCEDURE — 44970 LAPAROSCOPY APPENDECTOMY: CPT | Performed by: SURGERY

## 2023-02-16 PROCEDURE — 250N000011 HC RX IP 250 OP 636: Performed by: INTERNAL MEDICINE

## 2023-02-16 PROCEDURE — 258N000003 HC RX IP 258 OP 636: Performed by: NURSE ANESTHETIST, CERTIFIED REGISTERED

## 2023-02-16 PROCEDURE — 82150 ASSAY OF AMYLASE: CPT | Mod: ZL

## 2023-02-16 PROCEDURE — 370N000017 HC ANESTHESIA TECHNICAL FEE, PER MIN: Performed by: SURGERY

## 2023-02-16 PROCEDURE — 36415 COLL VENOUS BLD VENIPUNCTURE: CPT | Mod: ZL

## 2023-02-16 PROCEDURE — 81001 URINALYSIS AUTO W/SCOPE: CPT | Mod: ZL

## 2023-02-16 PROCEDURE — 83690 ASSAY OF LIPASE: CPT | Mod: ZL

## 2023-02-16 PROCEDURE — 250N000011 HC RX IP 250 OP 636: Performed by: NURSE ANESTHETIST, CERTIFIED REGISTERED

## 2023-02-16 PROCEDURE — 710N000010 HC RECOVERY PHASE 1, LEVEL 2, PER MIN: Performed by: SURGERY

## 2023-02-16 PROCEDURE — 99214 OFFICE O/P EST MOD 30 MIN: CPT

## 2023-02-16 RX ORDER — MYCOPHENOLATE MOFETIL 500 MG/1
1000 TABLET ORAL 2 TIMES DAILY
Status: DISCONTINUED | OUTPATIENT
Start: 2023-02-16 | End: 2023-02-17 | Stop reason: HOSPADM

## 2023-02-16 RX ORDER — SODIUM CHLORIDE 1 G/1
4 TABLET ORAL
Status: DISCONTINUED | OUTPATIENT
Start: 2023-02-17 | End: 2023-02-17 | Stop reason: HOSPADM

## 2023-02-16 RX ORDER — MYCOPHENOLATE MOFETIL 500 MG/1
1000 TABLET ORAL 2 TIMES DAILY
Status: DISCONTINUED | OUTPATIENT
Start: 2023-02-16 | End: 2023-02-16

## 2023-02-16 RX ORDER — HYDROMORPHONE HCL IN WATER/PF 6 MG/30 ML
0.2 PATIENT CONTROLLED ANALGESIA SYRINGE INTRAVENOUS
Status: DISCONTINUED | OUTPATIENT
Start: 2023-02-16 | End: 2023-02-16

## 2023-02-16 RX ORDER — VECURONIUM BROMIDE 1 MG/ML
INJECTION, POWDER, LYOPHILIZED, FOR SOLUTION INTRAVENOUS PRN
Status: DISCONTINUED | OUTPATIENT
Start: 2023-02-16 | End: 2023-02-16

## 2023-02-16 RX ORDER — ACETAMINOPHEN 325 MG/1
650 TABLET ORAL EVERY 4 HOURS PRN
Qty: 100 TABLET | Refills: 0 | Status: SHIPPED | OUTPATIENT
Start: 2023-02-16

## 2023-02-16 RX ORDER — HYDROMORPHONE HCL IN WATER/PF 6 MG/30 ML
0.4 PATIENT CONTROLLED ANALGESIA SYRINGE INTRAVENOUS
Status: DISCONTINUED | OUTPATIENT
Start: 2023-02-16 | End: 2023-02-17 | Stop reason: HOSPADM

## 2023-02-16 RX ORDER — FENTANYL CITRATE 50 UG/ML
50 INJECTION, SOLUTION INTRAMUSCULAR; INTRAVENOUS EVERY 5 MIN PRN
Status: DISCONTINUED | OUTPATIENT
Start: 2023-02-16 | End: 2023-02-16 | Stop reason: HOSPADM

## 2023-02-16 RX ORDER — SODIUM CHLORIDE, SODIUM LACTATE, POTASSIUM CHLORIDE, CALCIUM CHLORIDE 600; 310; 30; 20 MG/100ML; MG/100ML; MG/100ML; MG/100ML
INJECTION, SOLUTION INTRAVENOUS CONTINUOUS
Status: DISCONTINUED | OUTPATIENT
Start: 2023-02-16 | End: 2023-02-16 | Stop reason: HOSPADM

## 2023-02-16 RX ORDER — ONDANSETRON 2 MG/ML
4 INJECTION INTRAMUSCULAR; INTRAVENOUS EVERY 6 HOURS PRN
Status: DISCONTINUED | OUTPATIENT
Start: 2023-02-16 | End: 2023-02-16

## 2023-02-16 RX ORDER — NALOXONE HYDROCHLORIDE 0.4 MG/ML
0.2 INJECTION, SOLUTION INTRAMUSCULAR; INTRAVENOUS; SUBCUTANEOUS
Status: DISCONTINUED | OUTPATIENT
Start: 2023-02-16 | End: 2023-02-17 | Stop reason: HOSPADM

## 2023-02-16 RX ORDER — BUPIVACAINE HYDROCHLORIDE 2.5 MG/ML
INJECTION, SOLUTION INFILTRATION; PERINEURAL PRN
Status: DISCONTINUED | OUTPATIENT
Start: 2023-02-16 | End: 2023-02-16 | Stop reason: HOSPADM

## 2023-02-16 RX ORDER — HYDROMORPHONE HYDROCHLORIDE 1 MG/ML
0.2 INJECTION, SOLUTION INTRAMUSCULAR; INTRAVENOUS; SUBCUTANEOUS EVERY 5 MIN PRN
Status: DISCONTINUED | OUTPATIENT
Start: 2023-02-16 | End: 2023-02-16 | Stop reason: HOSPADM

## 2023-02-16 RX ORDER — LABETALOL HYDROCHLORIDE 5 MG/ML
10 INJECTION, SOLUTION INTRAVENOUS
Status: DISCONTINUED | OUTPATIENT
Start: 2023-02-16 | End: 2023-02-16 | Stop reason: HOSPADM

## 2023-02-16 RX ORDER — BISACODYL 10 MG
10 SUPPOSITORY, RECTAL RECTAL DAILY PRN
Status: DISCONTINUED | OUTPATIENT
Start: 2023-02-16 | End: 2023-02-16

## 2023-02-16 RX ORDER — OXYCODONE HYDROCHLORIDE 5 MG/1
10 TABLET ORAL EVERY 4 HOURS PRN
Status: DISCONTINUED | OUTPATIENT
Start: 2023-02-16 | End: 2023-02-16 | Stop reason: HOSPADM

## 2023-02-16 RX ORDER — LIDOCAINE 40 MG/G
CREAM TOPICAL
Status: DISCONTINUED | OUTPATIENT
Start: 2023-02-16 | End: 2023-02-17 | Stop reason: HOSPADM

## 2023-02-16 RX ORDER — LIDOCAINE HYDROCHLORIDE 20 MG/ML
INJECTION, SOLUTION INFILTRATION; PERINEURAL PRN
Status: DISCONTINUED | OUTPATIENT
Start: 2023-02-16 | End: 2023-02-16

## 2023-02-16 RX ORDER — IOPAMIDOL 755 MG/ML
103 INJECTION, SOLUTION INTRAVASCULAR ONCE
Status: COMPLETED | OUTPATIENT
Start: 2023-02-16 | End: 2023-02-16

## 2023-02-16 RX ORDER — SODIUM CHLORIDE 1 G/1
4 TABLET ORAL
Status: DISCONTINUED | OUTPATIENT
Start: 2023-02-16 | End: 2023-02-16

## 2023-02-16 RX ORDER — POLYETHYLENE GLYCOL 3350 17 G/17G
17 POWDER, FOR SOLUTION ORAL DAILY PRN
Status: DISCONTINUED | OUTPATIENT
Start: 2023-02-16 | End: 2023-02-16

## 2023-02-16 RX ORDER — CEFAZOLIN SODIUM 2 G/100ML
2 INJECTION, SOLUTION INTRAVENOUS SEE ADMIN INSTRUCTIONS
Status: DISCONTINUED | OUTPATIENT
Start: 2023-02-16 | End: 2023-02-16 | Stop reason: HOSPADM

## 2023-02-16 RX ORDER — HYDROMORPHONE HYDROCHLORIDE 1 MG/ML
0.4 INJECTION, SOLUTION INTRAMUSCULAR; INTRAVENOUS; SUBCUTANEOUS EVERY 5 MIN PRN
Status: DISCONTINUED | OUTPATIENT
Start: 2023-02-16 | End: 2023-02-16 | Stop reason: HOSPADM

## 2023-02-16 RX ORDER — PHENYLEPHRINE HYDROCHLORIDE 10 MG/ML
INJECTION INTRAVENOUS PRN
Status: DISCONTINUED | OUTPATIENT
Start: 2023-02-16 | End: 2023-02-16

## 2023-02-16 RX ORDER — ONDANSETRON 4 MG/1
4 TABLET, ORALLY DISINTEGRATING ORAL EVERY 30 MIN PRN
Status: DISCONTINUED | OUTPATIENT
Start: 2023-02-16 | End: 2023-02-16 | Stop reason: HOSPADM

## 2023-02-16 RX ORDER — NALOXONE HYDROCHLORIDE 0.4 MG/ML
0.4 INJECTION, SOLUTION INTRAMUSCULAR; INTRAVENOUS; SUBCUTANEOUS
Status: DISCONTINUED | OUTPATIENT
Start: 2023-02-16 | End: 2023-02-16

## 2023-02-16 RX ORDER — OXYCODONE HYDROCHLORIDE 5 MG/1
5 TABLET ORAL EVERY 4 HOURS PRN
Status: DISCONTINUED | OUTPATIENT
Start: 2023-02-16 | End: 2023-02-16 | Stop reason: HOSPADM

## 2023-02-16 RX ORDER — LIDOCAINE 40 MG/G
CREAM TOPICAL
Status: DISCONTINUED | OUTPATIENT
Start: 2023-02-16 | End: 2023-02-16

## 2023-02-16 RX ORDER — GLYCOPYRROLATE 0.2 MG/ML
INJECTION, SOLUTION INTRAMUSCULAR; INTRAVENOUS PRN
Status: DISCONTINUED | OUTPATIENT
Start: 2023-02-16 | End: 2023-02-16

## 2023-02-16 RX ORDER — SODIUM CHLORIDE 9 MG/ML
INJECTION, SOLUTION INTRAVENOUS CONTINUOUS
Status: DISCONTINUED | OUTPATIENT
Start: 2023-02-16 | End: 2023-02-16

## 2023-02-16 RX ORDER — NALOXONE HYDROCHLORIDE 0.4 MG/ML
0.4 INJECTION, SOLUTION INTRAMUSCULAR; INTRAVENOUS; SUBCUTANEOUS
Status: DISCONTINUED | OUTPATIENT
Start: 2023-02-16 | End: 2023-02-17 | Stop reason: HOSPADM

## 2023-02-16 RX ORDER — SODIUM CHLORIDE, SODIUM LACTATE, POTASSIUM CHLORIDE, CALCIUM CHLORIDE 600; 310; 30; 20 MG/100ML; MG/100ML; MG/100ML; MG/100ML
INJECTION, SOLUTION INTRAVENOUS CONTINUOUS PRN
Status: DISCONTINUED | OUTPATIENT
Start: 2023-02-16 | End: 2023-02-16

## 2023-02-16 RX ORDER — AMOXICILLIN 250 MG
1 CAPSULE ORAL 2 TIMES DAILY PRN
Status: DISCONTINUED | OUTPATIENT
Start: 2023-02-16 | End: 2023-02-16

## 2023-02-16 RX ORDER — AMOXICILLIN 250 MG
2 CAPSULE ORAL 2 TIMES DAILY PRN
Status: DISCONTINUED | OUTPATIENT
Start: 2023-02-16 | End: 2023-02-16

## 2023-02-16 RX ORDER — OXYCODONE HYDROCHLORIDE 5 MG/1
5 TABLET ORAL EVERY 6 HOURS PRN
Qty: 12 TABLET | Refills: 0 | Status: SHIPPED | OUTPATIENT
Start: 2023-02-16

## 2023-02-16 RX ORDER — OXYCODONE HYDROCHLORIDE 5 MG/1
5 TABLET ORAL EVERY 4 HOURS PRN
Status: DISCONTINUED | OUTPATIENT
Start: 2023-02-16 | End: 2023-02-17 | Stop reason: HOSPADM

## 2023-02-16 RX ORDER — OXCARBAZEPINE 300 MG/1
1200 TABLET, FILM COATED ORAL 2 TIMES DAILY
Status: DISCONTINUED | OUTPATIENT
Start: 2023-02-16 | End: 2023-02-16

## 2023-02-16 RX ORDER — NALOXONE HYDROCHLORIDE 0.4 MG/ML
0.2 INJECTION, SOLUTION INTRAMUSCULAR; INTRAVENOUS; SUBCUTANEOUS
Status: DISCONTINUED | OUTPATIENT
Start: 2023-02-16 | End: 2023-02-16

## 2023-02-16 RX ORDER — ONDANSETRON 2 MG/ML
4 INJECTION INTRAMUSCULAR; INTRAVENOUS EVERY 30 MIN PRN
Status: DISCONTINUED | OUTPATIENT
Start: 2023-02-16 | End: 2023-02-16 | Stop reason: HOSPADM

## 2023-02-16 RX ORDER — AMOXICILLIN 250 MG
1-2 CAPSULE ORAL 2 TIMES DAILY
Qty: 30 TABLET | Refills: 0 | Status: SHIPPED | OUTPATIENT
Start: 2023-02-16

## 2023-02-16 RX ORDER — DEXTROSE MONOHYDRATE 25 G/50ML
25-50 INJECTION, SOLUTION INTRAVENOUS
Status: DISCONTINUED | OUTPATIENT
Start: 2023-02-16 | End: 2023-02-16

## 2023-02-16 RX ORDER — ONDANSETRON 4 MG/1
4 TABLET, ORALLY DISINTEGRATING ORAL EVERY 6 HOURS PRN
Status: DISCONTINUED | OUTPATIENT
Start: 2023-02-16 | End: 2023-02-16

## 2023-02-16 RX ORDER — OXCARBAZEPINE 300 MG/1
1200 TABLET, FILM COATED ORAL 2 TIMES DAILY
Status: DISCONTINUED | OUTPATIENT
Start: 2023-02-16 | End: 2023-02-17 | Stop reason: HOSPADM

## 2023-02-16 RX ORDER — HYDROMORPHONE HCL IN WATER/PF 6 MG/30 ML
0.2 PATIENT CONTROLLED ANALGESIA SYRINGE INTRAVENOUS
Status: DISCONTINUED | OUTPATIENT
Start: 2023-02-16 | End: 2023-02-17 | Stop reason: HOSPADM

## 2023-02-16 RX ORDER — LACOSAMIDE 200 MG/1
200 TABLET ORAL 2 TIMES DAILY
Status: DISCONTINUED | OUTPATIENT
Start: 2023-02-16 | End: 2023-02-17 | Stop reason: HOSPADM

## 2023-02-16 RX ORDER — FENTANYL CITRATE 50 UG/ML
25 INJECTION, SOLUTION INTRAMUSCULAR; INTRAVENOUS EVERY 5 MIN PRN
Status: DISCONTINUED | OUTPATIENT
Start: 2023-02-16 | End: 2023-02-16 | Stop reason: HOSPADM

## 2023-02-16 RX ORDER — PROPOFOL 10 MG/ML
INJECTION, EMULSION INTRAVENOUS PRN
Status: DISCONTINUED | OUTPATIENT
Start: 2023-02-16 | End: 2023-02-16

## 2023-02-16 RX ORDER — CEFAZOLIN SODIUM 2 G/100ML
2 INJECTION, SOLUTION INTRAVENOUS
Status: COMPLETED | OUTPATIENT
Start: 2023-02-16 | End: 2023-02-16

## 2023-02-16 RX ORDER — ACETAMINOPHEN 325 MG/1
975 TABLET ORAL EVERY 6 HOURS PRN
Status: DISCONTINUED | OUTPATIENT
Start: 2023-02-16 | End: 2023-02-16

## 2023-02-16 RX ORDER — FENTANYL CITRATE 50 UG/ML
INJECTION, SOLUTION INTRAMUSCULAR; INTRAVENOUS PRN
Status: DISCONTINUED | OUTPATIENT
Start: 2023-02-16 | End: 2023-02-16

## 2023-02-16 RX ORDER — NICOTINE POLACRILEX 4 MG
15-30 LOZENGE BUCCAL
Status: DISCONTINUED | OUTPATIENT
Start: 2023-02-16 | End: 2023-02-16

## 2023-02-16 RX ORDER — NEOSTIGMINE METHYLSULFATE 1 MG/ML
VIAL (ML) INJECTION PRN
Status: DISCONTINUED | OUTPATIENT
Start: 2023-02-16 | End: 2023-02-16

## 2023-02-16 RX ORDER — LACOSAMIDE 200 MG/1
200 TABLET ORAL 2 TIMES DAILY
Status: DISCONTINUED | OUTPATIENT
Start: 2023-02-16 | End: 2023-02-16

## 2023-02-16 RX ORDER — OXYCODONE HYDROCHLORIDE 5 MG/1
10 TABLET ORAL EVERY 4 HOURS PRN
Status: DISCONTINUED | OUTPATIENT
Start: 2023-02-16 | End: 2023-02-17 | Stop reason: HOSPADM

## 2023-02-16 RX ADMIN — SODIUM CHLORIDE: 9 INJECTION, SOLUTION INTRAVENOUS at 19:28

## 2023-02-16 RX ADMIN — SODIUM CHLORIDE, SODIUM LACTATE, POTASSIUM CHLORIDE, AND CALCIUM CHLORIDE: 600; 310; 30; 20 INJECTION, SOLUTION INTRAVENOUS at 17:41

## 2023-02-16 RX ADMIN — PHENYLEPHRINE HYDROCHLORIDE 100 MCG: 10 INJECTION INTRAVENOUS at 17:51

## 2023-02-16 RX ADMIN — LACOSAMIDE 200 MG: 200 TABLET ORAL at 23:02

## 2023-02-16 RX ADMIN — TAZOBACTAM SODIUM AND PIPERACILLIN SODIUM 3.38 G: 375; 3 INJECTION, SOLUTION INTRAVENOUS at 17:45

## 2023-02-16 RX ADMIN — PREDNISONE 15 MG: 5 TABLET ORAL at 19:28

## 2023-02-16 RX ADMIN — FENTANYL CITRATE 50 MCG: 50 INJECTION, SOLUTION INTRAMUSCULAR; INTRAVENOUS at 18:19

## 2023-02-16 RX ADMIN — IOPAMIDOL 103 ML: 755 INJECTION, SOLUTION INTRAVENOUS at 14:21

## 2023-02-16 RX ADMIN — SODIUM CHLORIDE, SODIUM LACTATE, POTASSIUM CHLORIDE, AND CALCIUM CHLORIDE: 600; 310; 30; 20 INJECTION, SOLUTION INTRAVENOUS at 18:23

## 2023-02-16 RX ADMIN — VECURONIUM BROMIDE 7 MG: 1 INJECTION, POWDER, LYOPHILIZED, FOR SOLUTION INTRAVENOUS at 17:45

## 2023-02-16 RX ADMIN — LIDOCAINE HYDROCHLORIDE 100 MG: 20 INJECTION, SOLUTION INFILTRATION; PERINEURAL at 17:45

## 2023-02-16 RX ADMIN — GLYCOPYRROLATE 0.4 MG: 0.2 INJECTION INTRAMUSCULAR; INTRAVENOUS at 18:19

## 2023-02-16 RX ADMIN — NEOSTIGMINE METHYLSULFATE 3 MG: 1 INJECTION, SOLUTION INTRAVENOUS at 18:19

## 2023-02-16 RX ADMIN — HYDROCORTISONE SODIUM SUCCINATE 100 MG: 100 INJECTION, POWDER, FOR SOLUTION INTRAMUSCULAR; INTRAVENOUS at 17:43

## 2023-02-16 RX ADMIN — PHENYLEPHRINE HYDROCHLORIDE 100 MCG: 10 INJECTION INTRAVENOUS at 17:59

## 2023-02-16 RX ADMIN — OXCARBAZEPINE 1200 MG: 300 TABLET, FILM COATED ORAL at 23:02

## 2023-02-16 RX ADMIN — FENTANYL CITRATE 50 MCG: 50 INJECTION, SOLUTION INTRAMUSCULAR; INTRAVENOUS at 17:43

## 2023-02-16 RX ADMIN — GLYCOPYRROLATE 0.2 MG: 0.2 INJECTION INTRAMUSCULAR; INTRAVENOUS at 17:43

## 2023-02-16 RX ADMIN — CEFAZOLIN SODIUM 2 G: 2 INJECTION, SOLUTION INTRAVENOUS at 17:21

## 2023-02-16 RX ADMIN — PROPOFOL 150 MG: 10 INJECTION, EMULSION INTRAVENOUS at 17:45

## 2023-02-16 ASSESSMENT — ACTIVITIES OF DAILY LIVING (ADL)
ADLS_ACUITY_SCORE: 37
ADLS_ACUITY_SCORE: 31
ADLS_ACUITY_SCORE: 37
ADLS_ACUITY_SCORE: 37

## 2023-02-16 ASSESSMENT — COLUMBIA-SUICIDE SEVERITY RATING SCALE - C-SSRS
4. HAVE YOU HAD THESE THOUGHTS AND HAD SOME INTENTION OF ACTING ON THEM?: NO
6. HAVE YOU EVER DONE ANYTHING, STARTED TO DO ANYTHING, OR PREPARED TO DO ANYTHING TO END YOUR LIFE?: NO
1. IN THE PAST MONTH, HAVE YOU WISHED YOU WERE DEAD OR WISHED YOU COULD GO TO SLEEP AND NOT WAKE UP?: NO
5. HAVE YOU STARTED TO WORK OUT OR WORKED OUT THE DETAILS OF HOW TO KILL YOURSELF? DO YOU INTEND TO CARRY OUT THIS PLAN?: NO
2. HAVE YOU ACTUALLY HAD ANY THOUGHTS OF KILLING YOURSELF IN THE PAST MONTH?: NO
3. HAVE YOU BEEN THINKING ABOUT HOW YOU MIGHT KILL YOURSELF?: NO

## 2023-02-16 ASSESSMENT — PAIN SCALES - GENERAL: PAINLEVEL: SEVERE PAIN (7)

## 2023-02-16 NOTE — PROGRESS NOTES
1.  Has the patient had a previous reaction to IV contrast? n    2.  Does the patient have kidney disease? n    3.  Is the patient on dialysis? n    If YES to any of these questions, exam will be reviewed with a Radiologist before administering contrast.    IV Contrast- Discharge Instructions After Your CT Scan      The IV contrast you received today will be filtered from your bloodstream by your kidneys during the next 24 hours and pass from the body in urine.  You will not be aware of this process and your urine will not change in color.  To help this process you should drink at least 4 additional glasses of water or juice today.  This reduces stress on your kidneys.    Most contrast reactions are immediate.  Should you develop symptoms of concern after discharge, contact the department at the number below.  After hours you should contact your personal physician.  If you develop breathing distress or wheezing, call 911.           Patient called in complaining of \"hallucinations\" trying to  things off the floor that weren't there. U/A with reflex ordered to rule out UTI.

## 2023-02-16 NOTE — PROGRESS NOTES
[place] [facility] ADMISSION NOTE    Patient admitted to room 352 at approximately 1540 via wheel chair from [facility]. Patient was accompanied by spouse.     Verbal SBAR report received from [place], LPN prior to patient arrival.     Patient ambulated to bed independently. Patient alert and oriented X 3. Pain is controlled without any medications.  . Admission vital signs: Blood pressure (!) 153/80, pulse 118, temperature 99.3  F (37.4  C), temperature source Tympanic, resp. rate 16, SpO2 95 %, not currently breastfeeding. Patient was oriented to plan of care, call light, bed controls, tv, telephone, bathroom and visiting hours.     Risk Assessment    The following safety risks were identified during admission: fall. Yellow risk band applied: YES.     Skin Initial Assessment    This writer admitted this patient and completed a full skin assessment and Angelito score in the Adult PCS flowsheet. Appropriate interventions initiated as needed.            Education    Patient has a [place] to Observation order: Yes  Observation education completed and documented: Yes      Irene Sierra RN

## 2023-02-16 NOTE — ANESTHESIA PREPROCEDURE EVALUATION
Anesthesia Pre-Procedure Evaluation    Patient: Alicia Becker   MRN: [id number] : 1955        Procedure : Procedure(s):  APPENDECTOMY, LAPAROSCOPIC          History reviewed. No pertinent past medical history.   Past Surgical History:   Procedure Laterality Date     AS FLEX SIGMOIDOSCOPY W BIOPSY  2014    [facility], results not in chart     COLONOSCOPY  2014    10 year fu 2/3/24      Allergies   Allergen Reactions     Ciprofloxacin      Other reaction(s): Seizures     Levofloxacin      Other reaction(s): Seizures     Quinolones      Other reaction(s): Seizures     Carbamazepine      Other reaction(s): Other - Describe In Comment Field  Ineffective for seizure control.     Carbamazepine Other (See Comments)     Ineffective for seizure control     Levetiracetam      Other reaction(s): Other - Describe In Comment Field  Ineffective at 750 mg BID for seizure control.      Lisinopril      Other reaction(s): Yeast Infection     Lorazepam Other (See Comments)     Needed so much to stop seizures that she couldn't function.    Needed so much to stop seizures that she couldn't function.       Niacin      Other reaction(s): *Unknown  Persistant flushing     Nsaids Other (See Comments)     Sulfa Drugs      Other reaction(s): *Unknown     Valproic Acid      Other reaction(s): Other - Describe In Comment Field  Ineffective for seizure control.       Social History     Tobacco Use     Smoking status: Former     Passive exposure: Past     Smokeless tobacco: Never   Substance Use Topics     Alcohol use: No      Wt Readings from Last 1 Encounters:   23 80.7 kg (178 lb)        Anesthesia Evaluation   Pt has had prior anesthetic.         ROS/MED HX  ENT/Pulmonary:     (+) sleep apnea, uses CPAP, recent URI, resolved,     Neurologic:     (+) CVA, TIA,     Cardiovascular:     (+) hypertension-----pulmonary hypertension,     METS/Exercise Tolerance: 3 - Able to walk 1-2 blocks without stopping    Hematologic:  -  neg hematologic  ROS     Musculoskeletal: Comment: Left sided weakness with CVA, where lower leg brace.  (+) arthritis,     GI/Hepatic:  - neg GI/hepatic ROS     Renal/Genitourinary:  - neg Renal ROS     Endo:     (+) type I DM, thyroid problem,     Psychiatric/Substance Use:  - neg psychiatric ROS     Infectious Disease:  - neg infectious disease ROS     Malignancy:  - neg malignancy ROS     Other:  - neg other ROS          Physical Exam    Airway        Mallampati: II   TM distance: > 3 FB   Neck ROM: full   Mouth opening: > 3 cm    Respiratory Devices and Support         Dental       (+) Minor Abnormalities - some fillings, tiny chips      Cardiovascular   cardiovascular exam normal       Rhythm and rate: regular and normal     Pulmonary   pulmonary exam normal        breath sounds clear to auscultation           OUTSIDE LABS:  CBC:   Lab Results   Component Value Date    WBC 14.3 (H) 02/16/2023    WBC 7.0 07/09/2021    HGB 12.9 02/16/2023    HGB 12.8 07/09/2021    HCT 38.5 02/16/2023    HCT 38.9 07/09/2021     02/16/2023     07/09/2021     BMP:   Lab Results   Component Value Date     02/16/2023     07/09/2021    POTASSIUM 3.6 02/16/2023    POTASSIUM 3.9 07/09/2021    CHLORIDE 95 (L) 02/16/2023    CHLORIDE 97 (L) 07/09/2021    CO2 33 (H) 02/16/2023    CO2 33 (H) 07/09/2021    BUN 13.3 02/16/2023    BUN 16 07/09/2021    CR 0.43 (L) 02/16/2023    CR 0.65 07/09/2021     (H) 02/16/2023     (H) 02/16/2023     COAGS: No results found for: PTT, INR, FIBR  POC: No results found for: BGM, HCG, HCGS  HEPATIC:   Lab Results   Component Value Date    ALBUMIN 4.6 02/16/2023    PROTTOTAL 7.6 02/16/2023    ALT 20 02/16/2023    AST 17 02/16/2023    ALKPHOS 66 02/16/2023    BILITOTAL 0.3 02/16/2023     OTHER:   Lab Results   Component Value Date    PH 7.37 01/22/2019    LACT 1.8 02/16/2023    A1C 7.4 (H) 01/22/2019    OSCAR 8.9 02/16/2023    LIPASE 23 02/16/2023    AMYLASE 51 02/16/2023     CRP 4.6 07/09/2021    SED 12 07/09/2021       Anesthesia Plan    ASA Status:  3   NPO Status:  NPO Appropriate    Anesthesia Type: General.     - Airway: ETT              Consents    Anesthesia Plan(s) and associated risks, benefits, and realistic alternatives discussed. Questions answered and patient/representative(s) expressed understanding.     - Discussed: Risks, Benefits and Alternatives for the PROCEDURE were discussed     - Discussed with:  Patient         Postoperative Care            Comments:                David Kellerman, APRN CRNA

## 2023-02-16 NOTE — PROGRESS NOTES
ASSESSMENT/PLAN:    (R10.31) Abdominal pain, right lower quadrant  (primary encounter diagnosis)  Comment: Patient presents with right lower quadrant pain elevated white count and ANC, elevated CRP.  CT scan was completed and found to have acute appendicitis.  Plan: CBC and Differential, Comprehensive Metabolic         Panel, CRP inflammation, UA with Microscopic         reflex to Culture, Lipase, Amylase, CT Abdomen         Pelvis w Contrast            (K35.80) Acute appendicitis, unspecified acute appendicitis type  Comment: Patient found to have acute appendicitis.  Report was given to Dr. Reza Emanuel and general surgery as well as Dr. Paige hospitalist.  Transfer of care was initiated to hospital.      Discussed warning signs/symptoms indicative of need to f/u    Follow up if symptoms persist or worsen or concerns    I have reviewed the nursing notes.  I have reviewed the findings, diagnosis, plan and need for follow up with the patient.    I explained my diagnostic considerations and recommendations to the patient, who voiced understanding and agreement with the treatment plan. All questions were answered. We discussed potential side effects of any prescribed or recommended therapies, as well as expectations for response to treatments.    FERNANDO HALEY, APRN CNP  2/16/2023  12:24 PM    HPI:    Alicia Becker is a 67 year old female  who presents to [facility] today for concerns of RLQ pain.    Patient notes that she has had abdominal pain in the RLQ since last night.   She denies any urinary symptoms.    abdominal pain.     Subjective:   Pain Location:  RLQ, pain is constant and is intesified with walking.     Presence of the Following:  No diarrhea  No constipation  YES: this morning she endorses nausea  No vomiting  YES: she endorses chills with nausea.s  No urinary symptoms  No blood in stool  No mucus in stool    Last Bowel Movement: Yesterday      Any prior diagnosis of:   YES: years ago she had a  peptic ulcer dz/GERD  No pancreatitis  No appendicitis/appendectomy  No gall bladder pathology (gallstone, cholecystectomy, etc.)  No liver disease/pathology  No renal disease, renal stones, etc.  No diverticulosis/diverticulitis  No IBS/IBD  YES: she does have diabetes    No prior GI or GYN surgeries (appendectomy, cholecystectomy, hysterectomy, etc.)    No recent travel  No recent antibiotic usage  No sick/ill contact exposure  No recent hospitalization    Tobacco use: No  Alcohol use: No    Last meal: 0800: cereal    Last colonoscopy (timeframe, normal/abnormal): 2019    PCP: Ene Murray      No past medical history on file.  Past Surgical History:   Procedure Laterality Date     AS FLEX SIGMOIDOSCOPY W BIOPSY  01/24/2014    [facility], results not in chart     COLONOSCOPY  02/03/2014    10 year fu 2/3/24     Social History     Tobacco Use     Smoking status: Former     Passive exposure: Past     Smokeless tobacco: Never   Substance Use Topics     Alcohol use: No     Current Outpatient Medications   Medication Sig Dispense Refill     acetaminophen (TYLENOL) 325 MG tablet Take 2 tablets (650 mg) by mouth every 4 hours as needed for other (mild pain) 100 tablet 0     oxyCODONE (ROXICODONE) 5 MG tablet Take 1 tablet (5 mg) by mouth every 6 hours as needed for pain (Moderate to Severe) 12 tablet 0     senna-docusate (SENOKOT-S/PERICOLACE) 8.6-50 MG tablet Take 1-2 tablets by mouth 2 times daily Take while on oral narcotics to prevent or treat constipation. 30 tablet 0     Allergies   Allergen Reactions     Ciprofloxacin      Other reaction(s): Seizures     Levofloxacin      Other reaction(s): Seizures     Quinolones      Other reaction(s): Seizures     Carbamazepine      Other reaction(s): Other - Describe In Comment Field  Ineffective for seizure control.     Carbamazepine Other (See Comments)     Ineffective for seizure control     Levetiracetam      Other reaction(s): Other - Describe In Comment Field  Ineffective  "at 750 mg BID for seizure control.      Lisinopril      Other reaction(s): Yeast Infection     Lorazepam Other (See Comments)     Needed so much to stop seizures that she couldn't function.    Needed so much to stop seizures that she couldn't function.       Niacin      Other reaction(s): *Unknown  Persistant flushing     Nsaids Other (See Comments)     Sulfa Drugs      Other reaction(s): *Unknown     Valproic Acid      Other reaction(s): Other - Describe In Comment Field  Ineffective for seizure control.      Past medical history, past surgical history, current medications and allergies reviewed and accurate to the best of my knowledge.      ROS:  Refer to HPI    /52 (BP Location: Left arm, Patient Position: Sitting, Cuff Size: Adult Regular)   Pulse 98   Temp 98.5  F (36.9  C) (Tympanic)   Resp 24   Ht 1.6 m (5' 3\")   Wt 80.7 kg (178 lb)   SpO2 95%   BMI 31.53 kg/m      EXAM:  General Appearance: Well appearing 67 year old female, appropriate appearance for age. No acute distress   Ears: Left TM intact, translucent with bony landmarks appreciated, no erythema, no effusion, no bulging, no purulence.  Right TM intact, translucent with bony landmarks appreciated, no erythema, no effusion, no bulging, no purulence.  Left auditory canal clear.  Right auditory canal clear.  Normal external ears, non tender.  Eyes: conjunctivae normal without erythema or irritation, corneas clear, no drainage or crusting, no eyelid swelling, pupils equal   Oropharynx: moist mucous membranes, posterior pharynx without erythema, no exudates or petechiae, no post nasal drip seen, no trismus, voice clear.    Sinuses:  No sinus tenderness upon palpation of the frontal or maxillary sinuses  Nose:  Bilateral nares: no erythema, no edema, no drainage or congestion   Neck: supple without adenopathy  Respiratory: normal chest wall and respirations.  Normal effort.  Clear to auscultation bilaterally, no wheezing, crackles or rhonchi.  " No increased work of breathing.  No cough appreciated.  Cardiac: RRR with no murmurs  Abdomen: soft, RLQ tenderness/rebound tenderness and guarding, normal bowel sounds present. Referred RLQ pain with palpation of LLQ.   Musculoskeletal:  Equal movement of bilateral upper extremities.  Equal movement of bilateral lower extremities.  Normal gait.    Dermatological: no rashes noted of exposed skin  Neuro: Alert and oriented to person, place, and time.  Cranial nerves II-XII grossly intact with no focal or lateralizing deficits.  Muscle tone normal.  Gait normal. No tremor.   Psychological: normal affect, alert, oriented, and pleasant.     Labs:  Results for orders placed or performed during the hospital encounter of 02/16/23   Lactic Acid STAT     Status: Normal   Result Value Ref Range    Lactic Acid 1.8 0.7 - 2.0 mmol/L   Glucose by meter     Status: Abnormal   Result Value Ref Range    GLUCOSE BY METER POCT 139 (H) 70 - 99 mg/dL   Results for orders placed or performed in visit on 02/16/23   CT Abdomen Pelvis w Contrast     Status: None    Narrative    Exam: CT ABDOMEN PELVIS W CONTRAST    Exam reason: Abdominal pain, right lower quadrant    Technique: Using helical CT technique, axial images of the abdomen and  pelvis were obtained with IV contrast.  Coronal and sagittal  reconstructions also performed. This CT was performed using one or  more of the following dose reduction techniques: automated exposure  control, adjustment of the mA and/or kV according to patient size,  and/or use of iterative reconstruction technique.    Meds/Contrast: Isovue-370, 100 mL    Comparison:  1/23/2019, 12/1/2021.     FINDINGS:    ABDOMEN:    Liver: There are a couple of scattered subcentimeter hypodensities in  the liver, too small to further characterize but likely benign cysts.  Gallbladder: There are small calcified stones or a small amount of  sludge in the gallbladder. No gallbladder wall thickening.  Bile Ducts: No biliary  ductal dilation.   Spleen:  No splenomegaly or focal lesion.  Pancreas: There are 2 cystic lesions in the body/neck of the pancreas  and the larger of which measures up to 8 mm. No main pancreatic duct  dilation. No solid pancreatic mass.  Kidneys: No solid mass, hydronephrosis, or any definite calculi.   Adrenals:  No nodules.   Lymph Nodes: No adenopathy.   Vascular: No aortic aneurysm.   Abdominal Wall: No acute findings.     PELVIS:   No mass or adenopathy.     Bowel/Mesentery/Peritoneum:   -No bowel obstruction.   -There is dilation of the appendix measuring up to 13 mm with  periappendiceal fat stranding. No rim-enhancing fluid collection to  suggest abscess.  -No free air.  -No ascites.    Visualized portions of the Chest: No pleural effusion. There is  elevation of the right hemidiaphragm and right basilar atelectasis,  not significantly changed.  Musculoskeletal: There are chronic lower thoracic and lumbar  compression fractures, not significantly changed. No acute osseous  abnormality.      Impression    IMPRESSION:  Findings compatible with acute appendicitis. No rim-enhancing fluid  collection to suggest abscess.    There are 2 cystic lesions in the body/neck of the pancreas, the  larger of which measures up to 8 mm. Follow-up CT or MRI in 2 years is  recommended to ensure stability.    JASON DECKER MD         SYSTEM ID:  [id number]   Comprehensive Metabolic Panel     Status: Abnormal   Result Value Ref Range    Sodium 136 136 - 145 mmol/L    Potassium 3.6 3.4 - 5.3 mmol/L    Chloride 95 (L) 98 - 107 mmol/L    Carbon Dioxide (CO2) 33 (H) 22 - 29 mmol/L    Anion Gap 8 7 - 15 mmol/L    Urea Nitrogen 13.3 8.0 - 23.0 mg/dL    Creatinine 0.43 (L) 0.51 - 0.95 mg/dL    Calcium 8.9 8.8 - 10.2 mg/dL    Glucose 238 (H) 70 - 99 mg/dL    Alkaline Phosphatase 66 35 - 104 U/L    AST 17 10 - 35 U/L    ALT 20 10 - 35 U/L    Protein Total 7.6 6.4 - 8.3 g/dL    Albumin 4.6 3.5 - 5.2 g/dL    Bilirubin Total 0.3 <=1.2  mg/dL    GFR Estimate >90 >60 mL/min/1.73m2   CRP inflammation     Status: Abnormal   Result Value Ref Range    CRP Inflammation 12.97 (H) <5.00 mg/L   UA with Microscopic reflex to Culture     Status: Abnormal    Specimen: Urine, Midstream   Result Value Ref Range    Color Urine Yellow Colorless, Straw, Light Yellow, Yellow    Appearance Urine Clear Clear    Glucose Urine 500 (A) Negative mg/dL    Bilirubin Urine Negative Negative    Ketones Urine Negative Negative mg/dL    Specific Gravity Urine 1.011 1.000 - 1.030    Blood Urine Negative Negative    pH Urine 5.5 5.0 - 9.0    Protein Albumin Urine Negative Negative mg/dL    Urobilinogen Urine Normal Normal, 2.0 mg/dL    Nitrite Urine Negative Negative    Leukocyte Esterase Urine Negative Negative    RBC Urine 1 <=2 /HPF    WBC Urine <1 <=5 /HPF    Narrative    Urine Culture not indicated   Lipase     Status: Normal   Result Value Ref Range    Lipase 23 13 - 60 U/L   Amylase     Status: Normal   Result Value Ref Range    Amylase 51 28 - 100 U/L   Extra Tube     Status: None    Narrative    The following orders were created for panel order Extra Tube.  Procedure                               Abnormality         Status                     ---------                               -----------         ------                     Extra Serum Separator Tu...[[id number]]                      Final result                 Please view results for these tests on the individual orders.   CBC with platelets and differential     Status: Abnormal   Result Value Ref Range    WBC Count 14.3 (H) 4.0 - 11.0 10e3/uL    RBC Count 3.79 (L) 3.80 - 5.20 10e6/uL    Hemoglobin 12.9 11.7 - 15.7 g/dL    Hematocrit 38.5 35.0 - 47.0 %     (H) 78 - 100 fL    MCH 34.0 (H) 26.5 - 33.0 pg    MCHC 33.5 31.5 - 36.5 g/dL    RDW 13.3 10.0 - 15.0 %    Platelet Count 193 150 - 450 10e3/uL    % Neutrophils 91 %    % Lymphocytes 3 %    % Monocytes 6 %    % Eosinophils 0 %    % Basophils 0 %    %  Immature Granulocytes 0 %    NRBCs per 100 WBC 0 <1 /100    Absolute Neutrophils 12.9 (H) 1.6 - 8.3 10e3/uL    Absolute Lymphocytes 0.5 (L) 0.8 - 5.3 10e3/uL    Absolute Monocytes 0.8 0.0 - 1.3 10e3/uL    Absolute Eosinophils 0.0 0.0 - 0.7 10e3/uL    Absolute Basophils 0.0 0.0 - 0.2 10e3/uL    Absolute Immature Granulocytes 0.0 <=0.4 10e3/uL    Absolute NRBCs 0.0 10e3/uL   Extra Serum Separator Tube (SST)     Status: None   Result Value Ref Range    Hold Specimen [facility]    CBC and Differential     Status: Abnormal    Narrative    The following orders were created for panel order CBC and Differential.  Procedure                               Abnormality         Status                     ---------                               -----------         ------                     CBC with platelets and d...[[id number]]  Abnormal            Final result                 Please view results for these tests on the individual orders.

## 2023-02-16 NOTE — H&P
[facility]    History and Physical - Hospitalist Service       Date of Admission:  2/16/2023    Assessment & Plan      Alicia Becker is a 67 year old female admitted on 2/16/2023. She presents with acute appendicitis.    Principal Problem:    Acute appendicitis with localized peritonitis  Assessment: present on admission. Right lower quadrant pain, leukocytosis, abnormal CT of the abdomen and pelvis. patient has a history of vasculitis and is immunocompromised. No need for oxygen during day, has JAIMEE and oxygen at night. Able to walk around [employer] pushing a cart but does not engage in other physical activity.   Plan: Observation  NPO  Intravenous fluids   Consult general surgery  Zosyn day 1    Active Problems:    Cerebral hyponatremia  Assessment: chronic  Plan: contine sodium tablets      Essential (primary) hypertension  Assessment: chronic  Plan: resume blood pressure meds in AM      Immunocompromised patient (H)      Long term current use of systemic steroids  Assessment: currently on 7 mg prednisone.   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)  Assessment: chronic, on metformin and glipizide  Plan: hold oral meds, sliding scale insulin       Diet: NPO for Medical/Clinical Reasons Except for: Ice Chips    DVT Prophylaxis: Pneumatic Compression Devices  Hubbard Catheter: Not present  Lines: None     Cardiac Monitoring: None  Code Status: Full Code        Disposition Plan      Expected Discharge Date: 02/17/2023                  Arun Paige MD  Hospitalist Service  [facility]  Securely message with [employer] (more info)  Text  "page via [facility] Paging/Directory     ______________________________________________________________________    Chief Complaint   Right lower quadrant pain    History is obtained from the patient    History of Present Illness   Alicia Becker is a 67 year old female who presented to rapid clinic today complaining of right lower quadrant pain that started last night and progressed throughout the day.  It is worse with any movement including walking.  She has had some nausea with it and poor appetite.  She had a bowel movement yesterday.  She has noticed no dysuria.    Work-up in the rapid clinic shows leukocytosis.  A CT of the abdomen and pelvis shows acute appendicitis.  She is hemodynamically stable.      Past Medical History    Necrotizing vasculitis  Type 2 diabetes  Seizure disorder  Hypertension  Chronic hyponatremia  Obstructive sleep apnea    Past Surgical History   Past Surgical History:   Procedure Laterality Date     AS FLEX SIGMOIDOSCOPY W BIOPSY  01/24/2014    [facility], results not in chart     COLONOSCOPY  02/03/2014    10 year fu 2/3/24       Prior to Admission Medications   Prior to Admission Medications   Prescriptions Last Dose Informant Patient Reported? Taking?   OXcarbazepine (TRILEPTAL) 600 MG tablet   Yes No   Sig: Take 2 tablets by mouth 2 times daily   Saline (SODIUM CHLORIDE) 0.65 % SOLN   Yes No   Sig: Spray 1 spray in nostril nightly as needed For nasal dryness   Tuberculin-Allergy Syringes 25G X 5/8\" 1 ML MISC   Yes No   Sig: USE ONE NEW SYRINGE WITH EACH INJECTION EVERY 7 DAYS   acetaminophen (TYLENOL) 500 MG tablet   Yes No   Sig: Take 2 tablets by mouth every night at bedtime, also may take 2 tablets by mouth every 6 hours as needed for pain. Max acetaminophen dose: 4000mg in 24 hrs.   albuterol (PROAIR HFA/PROVENTIL HFA/VENTOLIN HFA) 108 (90 Base) MCG/ACT inhaler   Yes No   Sig: Inhale 1-2 puffs into the lungs every 4 hours as needed for shortness of breath / dyspnea Shake before using. "   alendronate (FOSAMAX) 70 MG tablet   Yes No   Sig: TAKE 1 TABLET BY MOUTH ONE TIME A WEEK. TAKE WITH PLAIN WATER IN THE MORNING   amLODIPine (NORVASC) 5 MG tablet   Yes No   Sig: Take 5 mg by mouth daily   amLODIPine (NORVASC) 5 MG tablet   Yes No   Sig: Take 1 tablet by mouth daily   Patient not taking: Reported on 2/16/2023   ascorbic acid (VITAMIN C) 500 MG tablet   Yes No   Sig: Take 500 mg by mouth daily    aspirin (ASA) 81 MG chewable tablet   Yes No   Sig: Take 81 mg by mouth daily   aspirin 81 MG chewable tablet   Yes No   Sig: Take 81 mg by mouth daily With a meal   Patient not taking: Reported on 2/16/2023   augmented betamethasone dipropionate (DIPROLENE-AF) 0.05 % external cream   Yes No   Sig: APPLY TOPICALLY ON LOWER LEGS BID FOR ONE WEEK ON THEN ONE WEEK OFF   Patient not taking: Reported on 2/16/2023   blood glucose (NO BRAND SPECIFIED) test strip   Yes No   Sig: USE AS DIRECTED TESTING 3 TO 4 TIMES DAILY   blood glucose monitoring (Gatheredtable MICROLET) lancets   Yes No   Sig: USE AS DIRECTED TESTING FOUR TIMES DAILY   calcium carbonate-vitamin D (OYSTER SHELL CALCIUM/D) 500-200 MG-UNIT tablet   Yes No   Sig: Take 1 tablet by mouth   clotrimazole (LOTRIMIN) 1 % external cream   Yes No   cyanocobalamin (VITAMIN B-12) 1000 MCG tablet   Yes No   Sig: Take 1,000 mcg by mouth daily   folic acid (FOLVITE) 1 MG tablet   Yes No   Sig: Take 1 mg by mouth daily   furosemide (LASIX) 20 MG tablet   Yes No   Sig: Take 1 tablet by mouth in the morning.   glipiZIDE (GLUCOTROL) 5 MG tablet   Yes No   Sig: Take 2.5 mg by mouth daily   glucagon 1 MG kit   Yes No   Sig: Inject 1 mg into the muscle once As needed for low blood sugar   ipratropium - albuterol 0.5 mg/2.5 mg/3 mL (DUONEB) 0.5-2.5 (3) MG/3ML neb solution   No No   Sig: Inhale one neb by mouth once daily. When patient has a respiratory illness may increase to 2-3 times daily.   lacosamide (VIMPAT) 200 MG TABS tablet   Yes No   Sig: Take 200 mg by mouth 2 times  daily   losartan (COZAAR) 100 MG tablet   Yes No   Sig: Take 100 mg by mouth daily   losartan (COZAAR) 100 MG tablet   Yes No   Sig: Take 1 tablet by mouth daily   Patient not taking: Reported on 2/16/2023   metFORMIN (GLUCOPHAGE) 500 MG tablet   Yes No   Sig: Take by mouth 2 times daily (with meals) Take 2 tablets with breakfast and 1 with dinner   metFORMIN (GLUCOPHAGE-XR) 500 MG 24 hr tablet   Yes No   Sig: TAKE 2 TABLETS BY MOUTH TWICE DAILY WITH MEALS. SWALLOW TABLET WHOLE. DO NOT CRUSH DIVIDE OR CHEW   Patient not taking: Reported on 2/16/2023   methimazole (TAPAZOLE) 5 MG tablet   Yes No   Sig: Take 5 mg by mouth daily   methotrexate 250 MG/10ML SOLN injection   Yes No   Sig: ADMINISTER 1 ML UNDER THE SKIN EVERY WEEK   montelukast (SINGULAIR) 10 MG tablet   Yes No   Sig: Take 10 mg by mouth At Bedtime   montelukast (SINGULAIR) 10 MG tablet   Yes No   Sig: Take 10 mg by mouth daily   Patient not taking: Reported on 2/16/2023   multivitamin w/minerals (THERA-VIT-M) tablet   Yes No   Sig: Take 1 tablet by mouth daily   mupirocin (BACTROBAN) 2 % external ointment   Yes No   Sig: APPLY SPARINGLY TOPICALLY IN EACH NOSTRIL TWICE DAILY FOR 1 MONTH   mycophenolate (GENERIC EQUIVALENT) 500 MG tablet   Yes No   Sig: Take 1,000 mg by mouth   omega 3 1000 MG CAPS   Yes No   Sig: Take 1 capsule by mouth 3 times daily (with meals)   potassium chloride SA (K-DUR/KLOR-CON M) 20 MEQ CR tablet   Yes No   Sig: Take 1 tablet by mouth 2 times daily (with meals) Do not crush.   predniSONE (DELTASONE) 1 MG tablet   Yes No   Sig: Take 3-4 capsules by mouth once daily with 5 mg tablet to equate either 8 mg or 9 mg once daily alternating every other day. Continue with taper as directed.   predniSONE (DELTASONE) 5 MG tablet   Yes No   Sig: Take 1 capsules by mouth once daily with 1 mg tablets to equate either 8 mg or 9 mg once daily alternating every other day. Continue with taper as directed.   simvastatin (ZOCOR) 20 MG tablet   Yes No    Sig: Take 1 tablet by mouth At Bedtime   sodium chloride 1 GM tablet   Yes No   Sig: Take 4 tablets by mouth 3 times daily (with meals)   traMADol (ULTRAM) 50 MG tablet   No No   Sig: Take 1 tablet (50 mg) by mouth every 6 hours as needed for severe pain   triamcinolone (KENALOG) 0.1 % external ointment   Yes No   vitamin B-12 (CYANOCOBALAMIN) 1000 MCG tablet   Yes No   Sig: Take 500 mcg by mouth   Patient not taking: Reported on 2/16/2023   vitamin E (TOCOPHEROL) 400 units (180 mg) capsule   Yes No   Sig: Take 400 Units by mouth daily       Facility-Administered Medications: None        Review of Systems    CONSTITUTIONAL: NEGATIVE for fever, chills, change in weight  INTEGUMENTARY/SKIN: NEGATIVE for worrisome rashes, moles or lesions  EYES: NEGATIVE for vision changes or irritation  ENT/MOUTH: NEGATIVE for ear, mouth and throat problems  RESP:NEGATIVE for dyspnea on exertion   CV: NEGATIVE for chest pain, palpitations or peripheral edema  GI: POSITIVE for nausea  : NEGATIVE for frequency, dysuria, or hematuria  MUSCULOSKELETAL: NEGATIVE for significant arthralgias or myalgia  NEURO: NEGATIVE for weakness, dizziness or paresthesias  ENDOCRINE: NEGATIVE for temperature intolerance, skin/hair changes  HEME: NEGATIVE for bleeding problems  PSYCHIATRIC: NEGATIVE for changes in mood or affect    Social History   I have reviewed this patient's social history and updated it with pertinent information if needed.  Social History     Tobacco Use     Smoking status: Former     Passive exposure: Past     Smokeless tobacco: Never   Vaping Use     Vaping Use: Never used   Substance Use Topics     Alcohol use: No     Drug use: No     Comment: Drug use: No       Family History   No significant family history    Allergies   Allergies   Allergen Reactions     Ciprofloxacin      Other reaction(s): Seizures     Levofloxacin      Other reaction(s): Seizures     Quinolones      Other reaction(s): Seizures     Carbamazepine       Other reaction(s): Other - Describe In Comment Field  Ineffective for seizure control.     Carbamazepine Other (See Comments)     Ineffective for seizure control     Levetiracetam      Other reaction(s): Other - Describe In Comment Field  Ineffective at 750 mg BID for seizure control.      Lisinopril      Other reaction(s): Yeast Infection     Lorazepam Other (See Comments)     Needed so much to stop seizures that she couldn't function.    Needed so much to stop seizures that she couldn't function.       Niacin      Other reaction(s): *Unknown  Persistant flushing     Nsaids Other (See Comments)     Sulfa Drugs      Other reaction(s): *Unknown     Valproic Acid      Other reaction(s): Other - Describe In Comment Field  Ineffective for seizure control.         Physical Exam   Vital Signs: Temp: 99.3  F (37.4  C) Temp src: Tympanic BP: (!) 153/80 Pulse: 118   Resp: 16 SpO2: 95 % O2 Device: None (Room air)    Weight: 0 lbs 0 oz    Constitutional: In no apparent distress  Eyes: pupils reactive, extraocular movements intact. Anicteric sclera.   HEENT: Neck supple, no JVD.  Respiratory: no crackles noted, no wheezes.  Cardiovascular: regular, no murmur. trace lower extremity edema.  GI: soft, non-tender, bowel sounds present.  Lymph/Hematologic: no cervical or supraclavicular LAD.  Genitourinary: deferred  Skin: no rashes, or sores  Musculoskeletal: no joint erythema or swelling  Neurologic: cranial nerves symmetric. Neuro exam nonfocal  Psychiatric: alert and oriented x3. Interactive.       Medical Decision Making     75 MINUTES SPENT BY ME on the date of service doing chart review, history, exam, documentation & further activities per the note.      Data     I have personally reviewed the following data over the past 24 hrs:    14.3 (H)  \   12.9   / 193     136 95 (L) 13.3 /  238 (H)   3.6 33 (H) 0.43 (L) \       ALT: 20 AST: 17 AP: 66 TBILI: 0.3   ALB: 4.6 TOT PROTEIN: 7.6 LIPASE: 23       Procal: N/A CRP: 12.97 (H)  Lactic Acid: 1.8         Imaging results reviewed over the past 24 hrs:   Recent Results (from the past 24 hour(s))   CT Abdomen Pelvis w Contrast    Narrative    Exam: CT ABDOMEN PELVIS W CONTRAST    Exam reason: Abdominal pain, right lower quadrant    Technique: Using helical CT technique, axial images of the abdomen and  pelvis were obtained with IV contrast.  Coronal and sagittal  reconstructions also performed. This CT was performed using one or  more of the following dose reduction techniques: automated exposure  control, adjustment of the mA and/or kV according to patient size,  and/or use of iterative reconstruction technique.    Meds/Contrast: Isovue-370, 100 mL    Comparison:  1/23/2019, 12/1/2021.     FINDINGS:    ABDOMEN:    Liver: There are a couple of scattered subcentimeter hypodensities in  the liver, too small to further characterize but likely benign cysts.  Gallbladder: There are small calcified stones or a small amount of  sludge in the gallbladder. No gallbladder wall thickening.  Bile Ducts: No biliary ductal dilation.   Spleen:  No splenomegaly or focal lesion.  Pancreas: There are 2 cystic lesions in the body/neck of the pancreas  and the larger of which measures up to 8 mm. No main pancreatic duct  dilation. No solid pancreatic mass.  Kidneys: No solid mass, hydronephrosis, or any definite calculi.   Adrenals:  No nodules.   Lymph Nodes: No adenopathy.   Vascular: No aortic aneurysm.   Abdominal Wall: No acute findings.     PELVIS:   No mass or adenopathy.     Bowel/Mesentery/Peritoneum:   -No bowel obstruction.   -There is dilation of the appendix measuring up to 13 mm with  periappendiceal fat stranding. No rim-enhancing fluid collection to  suggest abscess.  -No free air.  -No ascites.    Visualized portions of the Chest: No pleural effusion. There is  elevation of the right hemidiaphragm and right basilar atelectasis,  not significantly changed.  Musculoskeletal: There are chronic lower  thoracic and lumbar  compression fractures, not significantly changed. No acute osseous  abnormality.      Impression    IMPRESSION:  Findings compatible with acute appendicitis. No rim-enhancing fluid  collection to suggest abscess.    There are 2 cystic lesions in the body/neck of the pancreas, the  larger of which measures up to 8 mm. Follow-up CT or MRI in 2 years is  recommended to ensure stability.    JASON DECKER MD         SYSTEM ID:  [id number]

## 2023-02-16 NOTE — PROGRESS NOTES
Chief Complaint   Patient presents with     Abdominal Pain     Lower R quad - since last night around 10p     Patient in clinic with    Tx with pepto bismol without relief    Advanced Care Planning on file? yes    Medication Review Completed: complete    FOOD SECURITY SCREENING QUESTIONS:    The next two questions are to help us understand your food security.  If you are feeling you need any assistance in this area, we have resources available to support you today.    Hunger Vital Signs:  Within the past 12 months we worried whether our food would run out before we got money to buy more. Never  Within the past 12 months the food we bought just didn't last and we didn't have money to get more. Never    Verona Titus LPN

## 2023-02-16 NOTE — PROVIDER NOTIFICATION
02/16/23 1620   Valuables   Patient Belongings remains with patient   Patient Belongings Remaining with Patient other (see comments)  (shoes, clothing, left foot brace, purse, jacket, mittens, cell phone, cell ,)   Did you bring any home meds/supplements to the hospital?  No     A               Admission:  I am responsible for any personal items that are not sent to the safe or pharmacy.  [facility] is not responsible for loss, theft or damage of any property in my possession.    Signature:  _________________________________ Date: _______  Time: _____                                              Staff Signature:  ____________________________ Date: ________  Time: _____      2nd Staff person, if patient is unable/unwilling to sign:    Signature: ________________________________ Date: ________  Time: _____     Discharge:  [facility] has returned all of my personal belongings:    Signature: _________________________________ Date: ________  Time: _____                                          Staff Signature:  ____________________________ Date: ________  Time: _____

## 2023-02-16 NOTE — H&P
General Surgery Consultation    HPI:  Alicia Becker began having mid abdominal pain yesterday which then localized to the right lower quadrant.  Positive nausea and vomiting, nosignificant fevers or chills.  No previous episodes, and no sick contacts.  Pain has become more severe and the patient presented to the emergency room.  No history of Crohns or ulcerative colitis.  No history of pepticulcer disease or kidney stones.  No bleeding disorders.  Last bowel movement was yesterday, fairly normal.  No blood in the stool, emesis, or urine.      ROS:  REVIEW OF SYSTEMS  GENERAL:+ fevers or chills. Denies fatigue, recent weight loss.  HEENT: No sinus drainage. No changes with vision or hearing. No difficulty swallowing.   LYMPHATICS:  No swollen nodes in axilla, neck or groin.  CARDIOVASCULAR: Denies chest pain, palpitations and dyspnea on exertion.  VASCULAR: No hx of aneurysm, varicose veins, leg pain with walking, leg pain at night.  PULMONARY: HTN  GI: Denies melena, bright red blood in stools. No hematemesis. No constipation or diarrhea.  : No dysuria or hematuria.  SKIN: No recent rashes or ulcers.   HEMATOLOGY:  No history of easy bruising or bleeding.  ENDOCRINE:  No history of diabetes or thyroid problems. No cold/heat intolerance.  NEUROLOGY:  No history of seizures or headaches. No motor or sensory changes.  PSYCH: No hx of depression or anxiety  MUSCULOSKELETAL: No Joint pain or muscle pain.         Exam:  BP (!) 153/80 (BP Location: Right arm, Patient Position: Chair)   Pulse 118   Temp 99.3  F (37.4  C) (Tympanic)   Resp 16   SpO2 95%   General: No acute distress. Pleasant and cooperative with exam and interview.  HEENT: Head: Normocephalic, atraumatic. Eyes: PERRLA, EOMI. No icterus. Nose: no nasal drainage. Nolesions. Mouth: mucus membranes are pink and moist. No lesions.  Neck: trachea mid-line. No thyroid nodules.   Lymphatics: no adenopathy cervical, supraclavicular or axillary nodes.  Lungs:  clear to auscultation, norespiratory distress.  Heart: regular, no murmur, no extremity edema.  Abdomen: positive bowel sounds, soft. No organomegaly. No hernia. Tender in right lower quadrant with palpation.  Skin: pink, warm and dry withno rash.  Psych: awake, alert and oriented x3. Affect appropriate.    Recent Labs   Lab Test 02/16/23  1242 07/09/21  2045 01/22/19  2245 12/28/16  0554 12/27/16  0528   WBC 14.3* 7.0   < >  --   --    RBC 3.79* 3.80   < >  --   --    HGB 12.9 12.8   < > 12.3 11.0*   HCT 38.5 38.9   < > 35.7 33.6   * 102*   < > 100 109*   MCH 34.0* 33.7*   < > 34.5* 35.8*   MCHC 33.5 32.9   < > 34.5 32.8    248   < > 280 244   MPV  --   --   --  6.9 6.2*    < > = values in this interval not displayed.       Recent Labs   Lab Test 02/16/23 1242 07/09/21 2045   POTASSIUM 3.6 3.9   CHLORIDE 95* 97*   BUN 13.3 16     Recent Labs   Lab Test 02/16/23 1242 02/16/23  1239 07/09/21 2045   PROTEIN  --  Negative  --    BILITOTAL 0.3  --  0.3   AST 17  --  16   ALT 20  --  15           Assessment:  Right lower quadrant abdominal pain.  Based on history and physical examination, labs, and CT scan images and report, most consistent with appendicitis.      Plan:  NPO/IVF/IV Abx  Pt to operating room urgently for appendectomy.    Discussed options laparoscopic appendectomy, possible open vs medical management.    The patient and family were explained risks and benefits of the procedure includingbut not limited to bleeding and possible infection, possible conversion to open procedure and possible development of a wound infection or post operative abscess requiring drainage.  Also that the appendix will be removedeven if it appears normal.  Also possible damage to the bowel or bladder or surrounding tissues. They appeared to understand and wished to proceed.  Written informed consent paperwork was completed.    Case d/w Dr. Paige in the ER      Reza Emanuel MD  General Surgery  Reza Emanuel,  MD on 2/16/2023 at 4:52 PM

## 2023-02-16 NOTE — ANESTHESIA PROCEDURE NOTES
Airway       Patient location during procedure: OR       Procedure Start/Stop Times: 2/16/2023 5:45 PM  Staff -        CRNA: Kellerman, David, APRN CRNA       Performed By: CRNA  Consent for Airway        Urgency: elective  Indications and Patient Condition       Indications for airway management: luan-procedural       Induction type:intravenous       Mask difficulty assessment: 0 - not attempted    Final Airway Details       Final airway type: endotracheal airway       Successful airway: ETT - single  Endotracheal Airway Details        ETT size (mm): 7.0       Cuffed: yes       Cuff volume (mL): 8       Successful intubation technique: direct laryngoscopy       DL Blade Type: MAC 4       Grade View of Cords: 1       Adjucts: stylet       Position: Right       Measured from: gums/teeth       Secured at (cm): 22       Bite block used: None    Post intubation assessment        Placement verified by: capnometry, equal breath sounds and chest rise        Number of attempts at approach: 1       Number of other approaches attempted: 0       Secured with: silk tape       Ease of procedure: easy       Dentition: Intact and Unchanged    Medication(s) Administered   Medication Administration Time: 2/16/2023 5:45 PM

## 2023-02-17 VITALS
TEMPERATURE: 96.7 F | DIASTOLIC BLOOD PRESSURE: 69 MMHG | RESPIRATION RATE: 16 BRPM | HEART RATE: 77 BPM | SYSTOLIC BLOOD PRESSURE: 125 MMHG | OXYGEN SATURATION: 95 %

## 2023-02-17 LAB — GLUCOSE BLDC GLUCOMTR-MCNC: 118 MG/DL (ref 70–99)

## 2023-02-17 PROCEDURE — 82962 GLUCOSE BLOOD TEST: CPT

## 2023-02-17 PROCEDURE — 99239 HOSP IP/OBS DSCHRG MGMT >30: CPT | Performed by: INTERNAL MEDICINE

## 2023-02-17 PROCEDURE — 250N000013 HC RX MED GY IP 250 OP 250 PS 637: Performed by: INTERNAL MEDICINE

## 2023-02-17 PROCEDURE — G0378 HOSPITAL OBSERVATION PER HR: HCPCS

## 2023-02-17 PROCEDURE — 250N000012 HC RX MED GY IP 250 OP 636 PS 637: Performed by: INTERNAL MEDICINE

## 2023-02-17 RX ORDER — METFORMIN HCL 500 MG
2000 TABLET, EXTENDED RELEASE 24 HR ORAL 2 TIMES DAILY WITH MEALS
COMMUNITY
Start: 2023-02-17

## 2023-02-17 RX ORDER — ACETAMINOPHEN 325 MG/1
650 TABLET ORAL EVERY 6 HOURS PRN
Status: DISCONTINUED | OUTPATIENT
Start: 2023-02-17 | End: 2023-02-17 | Stop reason: HOSPADM

## 2023-02-17 RX ADMIN — PREDNISONE 15 MG: 5 TABLET ORAL at 09:38

## 2023-02-17 RX ADMIN — SODIUM CHLORIDE TAB 1 GM 4 G: 1 TAB at 08:06

## 2023-02-17 RX ADMIN — MYCOPHENOLATE MOFETIL 1000 MG: 500 TABLET ORAL at 03:58

## 2023-02-17 RX ADMIN — ACETAMINOPHEN 650 MG: 325 TABLET ORAL at 00:11

## 2023-02-17 ASSESSMENT — ACTIVITIES OF DAILY LIVING (ADL)
ADLS_ACUITY_SCORE: 37

## 2023-02-17 NOTE — ANESTHESIA CARE TRANSFER NOTE
Patient: Alicia Becker    Procedure: Procedure(s):  APPENDECTOMY, LAPAROSCOPIC       Diagnosis: Acute appendicitis with localized peritonitis, unspecified whether abscess present, unspecified whether gangrene present, unspecified whether perforation present [K35.30]  Diagnosis Additional Information: No value filed.    Anesthesia Type:   General     Note:      Level of Consciousness: drowsy  Oxygen Supplementation: face mask  Level of Supplemental Oxygen (L/min / FiO2): 8  Independent Airway: airway patency satisfactory and stable  Dentition: dentition unchanged  Vital Signs Stable: post-procedure vital signs reviewed and stable  Report to RN Given: handoff report given  Patient transferred to: PACU    Handoff Report: Identifed the Patient, Identified the Reponsible Provider, Reviewed the pertinent medical history, Discussed the surgical course, Reviewed Intra-OP anesthesia mangement and issues during anesthesia, Set expectations for post-procedure period and Allowed opportunity for questions and acknowledgement of understanding      Vitals:  Vitals Value Taken Time   BP     Temp     Pulse     Resp     SpO2         Electronically Signed By: David Kellerman, APRN CRNA  February 16, 2023  6:37 PM

## 2023-02-17 NOTE — ANESTHESIA POSTPROCEDURE EVALUATION
Patient: Alicia Becker    Procedure: Procedure(s):  APPENDECTOMY, LAPAROSCOPIC       Anesthesia Type:  General    Note:  Disposition: Inpatient   Postop Pain Control:            Sign Out: Well controlled pain   PONV: No   Neuro/Psych:             Sign Out: Acceptable/Baseline neuro status   Airway/Respiratory:             Sign Out: Acceptable/Baseline resp. status   CV/Hemodynamics:             Sign Out: Acceptable CV status   Other NRE: NONE   DID A NON-ROUTINE EVENT OCCUR? No           Last vitals:  Vitals Value Taken Time   /78 02/16/23 1900   Temp 98.2  F (36.8  C) 02/16/23 1855   Pulse 98 02/16/23 1903   Resp 21 02/16/23 1903   SpO2 96 % 02/16/23 1903   Vitals shown include unvalidated device data.    Electronically Signed By: David Kellerman, APRN CRNA  February 16, 2023  10:04 PM

## 2023-02-17 NOTE — DISCHARGE SUMMARY
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 Right lower quadrant pain, leukocytosis, abnormal CT of the abdomen and pelvis. patient has a history of vasculitis and is immunocompromised. No need for oxygen during day, has JAIMEE and oxygen at night. Able to walk around [employer] pushing a cart but does not engage in other physical activity. Surgery to night on 2/16 and tolerated well. Discharged 2/17.    Active Problems:    Cerebral hyponatremia  Assessment: chronic  Plan: contine sodium tablets       Essential (primary) hypertension  Assessment: chronic  Plan: resume blood pressure meds in AM       Immunocompromised patient (H)       Long term current use of systemic steroids  Assessment: currently on 7 mg prednisone.   Plan: Plan stress dosing perioperatively with 15 mg prednisone. Resume prednisone 7 mg tomorrow.       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 Resume metformin Sunday.       Consultations This Hospital Stay   CARE MANAGEMENT / SOCIAL WORK IP CONSULT  PHYSICAL THERAPY ADULT IP CONSULT  OCCUPATIONAL THERAPY ADULT IP CONSULT    Code Status   Full Code    Time Spent on this Encounter   I, Arun Paige MD, personally saw the patient today and spent greater than 30 minutes discharging this patient.       Arun Paige MD  [facility]  [address]  [place] [address]  Phone: [phone]  Fax: [phone]  ______________________________________________________________________    Physical Exam   Vital Signs: Temp: (!) 96.7  F (35.9  C) Temp src: Tympanic BP: 125/69 Pulse: 77   Resp: 16 SpO2: 95 % O2 Device: None (Room air) Oxygen Delivery: 2 LPM  Weight: 0 lbs 0 oz  GENERAL: Comfortable, no apparent distress.  CARDIOVASCULAR: regular rate and rhythm, no murmur. No lower extremity edema   RESPIRATORY: Clear to auscultation bilaterally, no wheezes or crackles.  GI: non-tender, non-distended, normal bowel sounds.   SKIN: warm periphery, no rashes         Primary Care Physician   Ene Murray    Discharge Orders      When to call - Contact Surgeon Team    You may experience symptoms that require follow-up before your scheduled appointment. Contact your Surgeon Team if you are concerned about pain control, large amount of bleeding, blood clots, constipation, or if you experience signs of infection (fever, growing tenderness at the surgery site, a large amount of drainage, severe pain, foul-smelling drainage, redness or swelling.     When to call - Reach out to Urgent Care    If you are experiencing uncontrolled Nausea and Vomiting, uncontrolled pain, inability to urinate and uncomfortable, and in need of immediate care, and you  are NOT able to reach your Surgeon Team, go to an Urgent Care clinic. Do NOT go to the Emergency Room unless you have shortness of breath, chest pain, or other signs of a medical emergency.     When to call - Reasons to Call 911    Call 911 immediately if you experience sudden-onset chest pain, arm weakness/numbness, slurred speech, or shortness of breath     Symptoms - Fever Management    A low grade fever can be expected after surgery. Your Provider many have prescribed an Opioid pain medication that also contains acetaminophen (TYLENOL) that may help with Fever management.  Do NOT take additional acetaminophen (TYLENOL) in combination with an Opioid/acetaminophen (TYLENOL) product. Read the labels on your Over The Counter (OTC) medications with care.     Symptoms - Reduced Urine Output    If it has been greater than 8 hours since you have urinated despite drinking plenty of water, call your Surgeon Team.     No driving or operating machinery    Do NOT drive any vehicle or operate mechanical equipment for 24 hours following the end of your surgery.  Even though you may feel normal, your reactions may be affected by Anesthesia medication you received.     No Alcohol    Do NOT drink alcoholic beverages for 24 hours following your surgery and while taking pain medications.     Diet Instructions    Follow your surgeon's orders for any diet restrictions.  If you did not receive any diet restrictions, you may drink clear liquids (apple juice, ginger ale, 7-up, broth, etc.), and progress to your regular diet as you feel able. It is important to stay well-hydrated after surgery and drink plenty of water.     Dressing / Wound Care - Wound    You have  glue on your surgical wound. Dressing change instructions as follows: OK to shower.    Contact your Surgeon Team if you have increased redness, warmth around the surgical wound, and/or drainage from the surgical wound.     Discharge Instructions - Comfort and Pain Management     Pain after surgery is normal and expected. You will have some amount of pain after surgery. Your pain will improve with time. There are several things you can do to help reduce your pain including: rest, ice, and using pain medications as needed. Use pain interventions and don't wait until pain level is out of control. Contact your Surgeon Team if you have pain that persists or worsens after surgery despite rest, ice, and taking your medication(s) as prescribed. You may have a dry mouth, a sore throat, muscles aches or trouble sleeping, and these symptoms should go away after 24 hours.     Discharge Instructions - Rest    Rest and relax for the next 24 hours. Make arrangements to have someone stay with you overnight, and avoid hazardous and strenuous activities.  Do NOT make any important decisions for the next 24 hours.     Shower/Bathing - Restrictions: Let water run over incisions and pat dry. No tub baths until bleeding stops.    Shower/Bathing - Restrictions: Let water run over incisions and pat dry. No tub baths until bleeding stops. Do NOT soak in any body of water (lake, pool, bath, etc.) for 7-10 days postoperatively.     Ice to affected area    Ice to operative site PRN     Discharge Instructions - IF Obstructive Sleep Apnea    You were given anesthetic medication during surgery to keep you comfortable and free of pain. After surgery, you may have more apnea spells because of this medication. The spells may last longer than usual. At Home: Keep using the continuous positive airway pressure (CPAP) device when you sleep, unless your Provider has instructed you not to use the CPAP device.     Return to normal activity as tolerated    Return to normal activity as tolerated     May return to work (WITH restrictions)    May return to work in 1 week(s) with the following restrictions: none     Reason for your hospital stay    appendicitis     Follow-up and recommended labs and tests     Follow up with primary  care provider, Ene Murray, as needed     Activity    Your activity upon discharge: activity as tolerated     Diet    Follow this diet upon discharge: Orders Placed This Encounter      Regular Diet Adult       Significant Results and Procedures   Most Recent 3 CBC's:Recent Labs   Lab Test 02/16/23 1242 07/09/21 2045 01/25/19  0440   WBC 14.3* 7.0 8.1   HGB 12.9 12.8 10.8*   * 102* 103*    248 193     Most Recent 3 BMP's:Recent Labs   Lab Test 02/17/23  0734 02/16/23 2120 02/16/23  1657 02/16/23 1242 07/09/21 2045 01/25/19  0440   NA  --   --   --  136 137 136   POTASSIUM  --   --   --  3.6 3.9 3.7   CHLORIDE  --   --   --  95* 97* 93*   CO2  --   --   --  33* 33* 36*   BUN  --   --   --  13.3 16 9   CR  --   --   --  0.43* 0.65 0.45*   ANIONGAP  --   --   --  8 7 7   OSCAR  --   --   --  8.9 9.3 9.1   * 197* 139* 238* 220* 194*     Most Recent 2 LFT's:Recent Labs   Lab Test 02/16/23 1242 07/09/21 2045   AST 17 16   ALT 20 15   ALKPHOS 66 72   BILITOTAL 0.3 0.3   ,   Results for orders placed or performed in visit on 02/16/23   CT Abdomen Pelvis w Contrast    Narrative    Exam: CT ABDOMEN PELVIS W CONTRAST    Exam reason: Abdominal pain, right lower quadrant    Technique: Using helical CT technique, axial images of the abdomen and  pelvis were obtained with IV contrast.  Coronal and sagittal  reconstructions also performed. This CT was performed using one or  more of the following dose reduction techniques: automated exposure  control, adjustment of the mA and/or kV according to patient size,  and/or use of iterative reconstruction technique.    Meds/Contrast: Isovue-370, 100 mL    Comparison:  1/23/2019, 12/1/2021.     FINDINGS:    ABDOMEN:    Liver: There are a couple of scattered subcentimeter hypodensities in  the liver, too small to further characterize but likely benign cysts.  Gallbladder: There are small calcified stones or a small amount of  sludge in the gallbladder. No  gallbladder wall thickening.  Bile Ducts: No biliary ductal dilation.   Spleen:  No splenomegaly or focal lesion.  Pancreas: There are 2 cystic lesions in the body/neck of the pancreas  and the larger of which measures up to 8 mm. No main pancreatic duct  dilation. No solid pancreatic mass.  Kidneys: No solid mass, hydronephrosis, or any definite calculi.   Adrenals:  No nodules.   Lymph Nodes: No adenopathy.   Vascular: No aortic aneurysm.   Abdominal Wall: No acute findings.     PELVIS:   No mass or adenopathy.     Bowel/Mesentery/Peritoneum:   -No bowel obstruction.   -There is dilation of the appendix measuring up to 13 mm with  periappendiceal fat stranding. No rim-enhancing fluid collection to  suggest abscess.  -No free air.  -No ascites.    Visualized portions of the Chest: No pleural effusion. There is  elevation of the right hemidiaphragm and right basilar atelectasis,  not significantly changed.  Musculoskeletal: There are chronic lower thoracic and lumbar  compression fractures, not significantly changed. No acute osseous  abnormality.      Impression    IMPRESSION:  Findings compatible with acute appendicitis. No rim-enhancing fluid  collection to suggest abscess.    There are 2 cystic lesions in the body/neck of the pancreas, the  larger of which measures up to 8 mm. Follow-up CT or MRI in 2 years is  recommended to ensure stability.    JASON DECKER MD         SYSTEM ID:  [id number]       Discharge Medications   Current Discharge Medication List      START taking these medications    Details   !! acetaminophen (TYLENOL) 325 MG tablet Take 2 tablets (650 mg) by mouth every 4 hours as needed for other (mild pain)  Qty: 100 tablet, Refills: 0    Associated Diagnoses: Acute appendicitis with localized peritonitis, without perforation, abscess, or gangrene      oxyCODONE (ROXICODONE) 5 MG tablet Take 1 tablet (5 mg) by mouth every 6 hours as needed for pain (Moderate to Severe)  Qty: 12 tablet, Refills: 0     Associated Diagnoses: Acute appendicitis with localized peritonitis, without perforation, abscess, or gangrene      senna-docusate (SENOKOT-S/PERICOLACE) 8.6-50 MG tablet Take 1-2 tablets by mouth 2 times daily Take while on oral narcotics to prevent or treat constipation.  Qty: 30 tablet, Refills: 0    Comments: While taking narcotics  Associated Diagnoses: Acute appendicitis with localized peritonitis, without perforation, abscess, or gangrene       !! - Potential duplicate medications found. Please discuss with provider.      CONTINUE these medications which have CHANGED    Details   metFORMIN (GLUCOPHAGE XR) 500 MG 24 hr tablet Take 4 tablets (2,000 mg) by mouth 2 times daily (with meals) Restart on Sunday 2/19         CONTINUE these medications which have NOT CHANGED    Details   !! predniSONE (DELTASONE) 1 MG tablet Take 2 tablets by mouth once daily with 5 mg tablet to equate 7 mg x 5 days a week and take 3 tablets with 5 mg tablet to equate 8 mg twice weekly.      !! predniSONE (DELTASONE) 5 MG tablet Take with 1 mg tablets once daily to equate 7 mg daily x 5 days per week and 8 mg x 2 days per week.      !! acetaminophen (TYLENOL) 500 MG tablet Take 2 tablets by mouth every night at bedtime, also may take 2 tablets by mouth every 6 hours as needed for pain. Max acetaminophen dose: 4000mg in 24 hrs.      albuterol (PROAIR HFA/PROVENTIL HFA/VENTOLIN HFA) 108 (90 Base) MCG/ACT inhaler Inhale 1-2 puffs into the lungs every 4 hours as needed for shortness of breath / dyspnea Shake before using.      alendronate (FOSAMAX) 70 MG tablet TAKE 1 TABLET BY MOUTH ONE TIME A WEEK. TAKE WITH PLAIN WATER IN THE MORNING      amLODIPine (NORVASC) 5 MG tablet Take 1 tablet by mouth daily      ascorbic acid (VITAMIN C) 500 MG tablet Take 500 mg by mouth daily       aspirin 81 MG chewable tablet Take 81 mg by mouth daily With a meal      blood glucose (NO BRAND SPECIFIED) test strip USE AS DIRECTED TESTING 3 TO 4 TIMES  DAILY      blood glucose monitoring (Runscope MICROLET) lancets USE AS DIRECTED TESTING FOUR TIMES DAILY      calcium carbonate-vitamin D (OYSTER SHELL CALCIUM/D) 500-200 MG-UNIT tablet Take 1 tablet by mouth      clotrimazole (LOTRIMIN) 1 % external cream       cyanocobalamin (VITAMIN B-12) 1000 MCG tablet Take 1,000 mcg by mouth daily      folic acid (FOLVITE) 1 MG tablet Take 1 mg by mouth daily      furosemide (LASIX) 20 MG tablet Take 1 tablet by mouth in the morning.      glipiZIDE (GLUCOTROL) 5 MG tablet Take 2.5 mg by mouth daily      glucagon 1 MG kit Inject 1 mg into the muscle once As needed for low blood sugar      ipratropium - albuterol 0.5 mg/2.5 mg/3 mL (DUONEB) 0.5-2.5 (3) MG/3ML neb solution Inhale one neb by mouth once daily. When patient has a respiratory illness may increase to 2-3 times daily.  Qty: 1 Box, Refills: 0    Associated Diagnoses: Essential (primary) hypertension      lacosamide (VIMPAT) 200 MG TABS tablet Take 200 mg by mouth 2 times daily      losartan (COZAAR) 100 MG tablet Take 1 tablet by mouth daily      methimazole (TAPAZOLE) 5 MG tablet Take 5 mg by mouth daily      methotrexate 250 MG/10ML SOLN injection ADMINISTER 1 ML UNDER THE SKIN EVERY WEEK      montelukast (SINGULAIR) 10 MG tablet Take 10 mg by mouth daily      multivitamin w/minerals (THERA-VIT-M) tablet Take 1 tablet by mouth daily      mupirocin (BACTROBAN) 2 % external ointment APPLY SPARINGLY TOPICALLY IN EACH NOSTRIL TWICE DAILY FOR 1 MONTH      mycophenolate (GENERIC EQUIVALENT) 500 MG tablet Take 1,000 mg by mouth 2 times daily      omega 3 1000 MG CAPS Take 1 capsule by mouth 3 times daily (with meals)      OXcarbazepine (TRILEPTAL) 600 MG tablet Take 2 tablets by mouth 2 times daily      potassium chloride SA (K-DUR/KLOR-CON M) 20 MEQ CR tablet Take 1 tablet by mouth 2 times daily (with meals) Do not crush.      Saline (SODIUM CHLORIDE) 0.65 % SOLN Spray 1 spray in nostril nightly as needed For nasal dryness     "  simvastatin (ZOCOR) 20 MG tablet Take 1 tablet by mouth At Bedtime      sodium chloride 1 GM tablet Take 4 tablets by mouth 3 times daily (with meals)      traMADol (ULTRAM) 50 MG tablet Take 1 tablet (50 mg) by mouth every 6 hours as needed for severe pain  Qty: 15 tablet, Refills: 0    Associated Diagnoses: Acute pain of left knee      triamcinolone (KENALOG) 0.1 % external ointment       Tuberculin-Allergy Syringes 25G X 5/8\" 1 ML MISC USE ONE NEW SYRINGE WITH EACH INJECTION EVERY 7 DAYS      vitamin E (TOCOPHEROL) 400 units (180 mg) capsule Take 400 Units by mouth daily        !! - Potential duplicate medications found. Please discuss with provider.      STOP taking these medications       augmented betamethasone dipropionate (DIPROLENE-AF) 0.05 % external cream Comments:   Reason for Stopping:             Allergies   Allergies   Allergen Reactions     Ciprofloxacin      Other reaction(s): Seizures     Levofloxacin      Other reaction(s): Seizures     Quinolones      Other reaction(s): Seizures     Nsaids Other (See Comments)     Sulfa Drugs      Other reaction(s): *Unknown     Carbamazepine      Other reaction(s): Other - Describe In Comment Field  Ineffective for seizure control.     Levetiracetam      Other reaction(s): Other - Describe In Comment Field  Ineffective at 750 mg BID for seizure control.      Lisinopril      Other reaction(s): Yeast Infection     Lorazepam Other (See Comments)     Needed so much to stop seizures that she couldn't function.         Niacin      Other reaction(s): *Unknown  Persistant flushing     Valproic Acid      Other reaction(s): Other - Describe In Comment Field  Ineffective for seizure control.      "

## 2023-02-17 NOTE — PHARMACY-ADMISSION MEDICATION HISTORY
Pharmacy -- Admission Medication Reconciliation - Pending    Prior to admission (PTA) medications were reviewed and the patient's PTA medication list was updated.    Sources Consulted: patient's , [employer]    The reliability of this Medication Reconciliation is: pending patient interview    The following significant changes were made:  Confirmed dose of Vimpat - requested  to bring in home supply  Confirmed dose of mycophenolate  Confirmed dose of Prednisone  Confirmed dose of Trileptal  Confirmed dose of sodium chloride    Removed duplicate medication entries.    Complete interview with patient will need to be completed on 2/17/23.    Sarah King formerly [facility], 2/16/2023,  6:29 PM

## 2023-02-17 NOTE — PLAN OF CARE
NSG DISCHARGE NOTE    Patient discharged to home at 10:00 AM via wheel chair. Accompanied by son and staff. Discharge instructions reviewed with patient, opportunity offered to ask questions. Prescriptions sent to patients preferred pharmacy. All belongings sent with patient.    Honey Zhou RN

## 2023-02-17 NOTE — PROGRESS NOTES
SAFETY CHECKLIST  ID Bands and Risk clasps correct and in place (DNR, Fall risk, Allergy, Latex, Limb):  Yes  All Lines Reconciled and labeled correctly: Yes  Whiteboard updated:Yes  Environmental interventions: Yes - See PCS  Verify Tele #: NA

## 2023-02-17 NOTE — OP NOTE
Preoperative Diagnosis: Acute appendicitis     Postoperative Diagnosis: Acute appendicitis with localized peritonitis    Procedure planned: Laparoscopic appendectomy    Procedure performed: Laparoscopic appendectomy     Surgeon: Reza Emanuel MD    Circulator: Kelsi Rios RN  Scrub Person: Radha Emanuel  First Assistant: Matthew Donnelly RN    Anesthesia: General endotracheal with local    Specimen: appendix     Estimated Blood Loss: less than 10 ml    INDICATIONS   Please see H&P. The patient had acute onset of RLQ pain. Her WBC is elevated and CT scan showed changes consistent with acute appendicitis. The risks, benefits and alternatives to laparoscopic appendectomy for treating acute appendicitiis were discussed with the patient. We specifically discussed the risks of infection, bleeding, injury to abdominal organs and the possible need for open procedure. The patient expressed understanding and questions were answered. Informed consent paperwork was completed.     DESCRIPTION OF PROCEDURE   The patient was brought to the operating room and placed in a supine position on the operating table. Appropriate monitors were attached. The patient received IV antibiotics preoperatively. After general anesthesia was induced, the patient was positioned, prepped and draped in the standard fashion. Time outwas performed confirming the patient's identity and procedure to be performed.   Local anesthetic was infiltrated in the skin and subcutaneous tissue just below the umbilicus. A 5 mm incision was made. The anterior fascia was grasped with a kocher.  The Veress needle was inserted into the peritoneal cavity and placement confirmed using saline test. CO2was then used to establish pneumoperitoneum. Trocar was inserted without difficulty. The camera was inserted, and the contents of the abdomen were inspected. No evidence of injury was noted on inspection. Local anestheticwas infiltrated in the mid lower abdomen and the left  lower abdomen. Skin incisions were made and under direct visualization trocars were positioned. The cecum was grasped and elevated. The appendix was identified and elevated. The base of the appendix was identified and grasped. Adhesions were taken down freeing the appendix to the tip. A window was created in the mesentery at the base of the appendix. The GI laparoscopic stapler was placed across the appendix and closed. The small bowel was inspected, no narrowing was noted. The stapler was fired. The staple line was inspected and there was excellent hemostasis. A vascular load was placed in the stapler and the staple positioned across the mesoappendix. The stapler was closed and fired. The staple line had excellent hemostasis. The appendix was placed in the retrieval bag and removed from the abdomen through the left lower abdomen port. The staple lines were iinspected. Hemostasis was excellent. No evidence of leak was noted. The camera was repositioned, and the umbilical port site was inspected. No evidence of injury noted. The pneumoperitoneum was then reduced and trocars removed from the abdomen. Further local anesthetic was infiltrated at each incision for postop pain control.  Deep tissues at the left lower abdomen were approximated using Vicryl suture. Skin edges were approximated using interrupted Monocryl suture. Exofin was applied. The patient was then awakened from anesthesia and taken to post anesthesia recovery in stablecondition. All needle, sponge and instrument counts were reported as correct at the conclusion of the case. The patient tolerated the procedure with no immediately apparent complications.     Reza Emanuel MD ....................  2/16/2023   6:25 PM

## 2023-02-17 NOTE — PHARMACY
Pharmacy - Transfer Medication Reconciliation     The patient's transfer medication orders have been compared to the medication administration record and to the Prior to Admissions Medications list - any noted discrepancies were resolved with the MD.     Thank you. Pharmacy will continue to monitor.     Kelsi Lazo [facility] ....................  2/17/2023   7:11 AM

## 2023-02-17 NOTE — PHARMACY - DISCHARGE MEDICATION RECONCILIATION AND EDUCATION
Pharmacy:  Discharge Counseling and Medication Reconciliation    Alicia Becker  [address] [place] [address]  [phone] (home)   67 year old female  PCP: Ene Murray    Allergies: Ciprofloxacin, Levofloxacin, Quinolones, Nsaids, Sulfa drugs, Carbamazepine, Levetiracetam, Lisinopril, Lorazepam, Niacin, and Valproic acid    Discharge Counseling:    Pharmacist met with patient (and/or family) today to review the medication portion of the After Visit Summary (with an emphasis on NEW medications) and to address patient's questions/concerns.    Summary of Education: Met with patient to discuss new medications: oxycodone, senna and acetaminophen.  Patient states she has Miralax at home and plans to use this for her bowel regimen.  We also discussed holding her metformin for 48 hours after contrast    Materials Provided:  MedCounselor sheets printed from Clinical Pharmacology on: oxycodone    Discharge Medication Reconciliation:    It has been determined that the patient has an adequate supply of medications available or which can be obtained from the patient's preferred pharmacy, which HE/SHE has confirmed as: [facility]    Thank you for the consult.    Kelsi Lazo [facility]........February 17, 2023 9:04 AM

## 2023-02-17 NOTE — OR NURSING
PACU Transfer Note    Alicia Becker was transferred to [facility] via bed .  Equipment used for transport:  Bed .  Accompanied by:  Sanjay hernandez   Prescriptions were:     PACU Respiratory Event Documentation     1) Episodes of Apnea greater than or equal to 10 seconds: no    2) Bradypnea - less than 8 breaths per minute: no    3) Pain score on 0 to 10 scale: no    4) Pain-sedation mismatch (yes or no): no    5) Repeated 02 desaturation less than 90% (yes or no): no    Anesthesia notified? (yes or no):  no    Any of the above events occuring repeatedly in separate 30 minute intervals may be considered recurrent PACU respiratory events.    Patient stable and meets phase 1 discharge criteria for transport from PACU.    report to josefina hernandez

## 2023-02-17 NOTE — PHARMACY-CONSULT NOTE
"Pharmacy: Patient Own Medication Identification    The requirements of Policy 13-03 from the Pharmacy Policy Manual have been met and the patient will be allowed to use their own supply of: lacosamide 200mg tabelts.    Sarah King RPH has verified the name, dose, route, and directions for each medication. The integrity has been assessed and deemed safe.     The product(s) have been labeled as being inspected by a pharmacist and the medication has been placed in the MSP lock box in the medication room.    Nursing will remove medication at the appropriately scheduled times and document the administration on the MAR with the designation of \"patient own\".    Nursing to return medication back to patient at time of discharge.     Sarah King RPH ....................  2/16/2023   7:29 PM    "

## 2023-02-17 NOTE — PROGRESS NOTES
Pt has home cpap in room. Pt has 3L O2 bleed in. Pt is independent with use. IS given at this time.

## 2023-02-20 ENCOUNTER — PATIENT OUTREACH (OUTPATIENT)
Dept: FAMILY MEDICINE | Facility: OTHER | Age: 68
End: 2023-02-20

## 2023-02-20 NOTE — TELEPHONE ENCOUNTER
Patient has PCP elsewhere, no follow-up here. No TCM call required per policy.  Shaneka Mueller RN on 2/20/2023 at 8:11 AM

## 2023-02-21 LAB
PATH REPORT.COMMENTS IMP SPEC: NORMAL
PATH REPORT.FINAL DX SPEC: NORMAL
PATH REPORT.RELEVANT HX SPEC: NORMAL
PHOTO IMAGE: NORMAL

## 2023-03-25 ENCOUNTER — OFFICE VISIT (OUTPATIENT)
Dept: FAMILY MEDICINE | Facility: OTHER | Age: 68
End: 2023-03-25
Attending: NURSE PRACTITIONER
Payer: COMMERCIAL

## 2023-03-25 VITALS
WEIGHT: 166.8 LBS | RESPIRATION RATE: 18 BRPM | SYSTOLIC BLOOD PRESSURE: 120 MMHG | OXYGEN SATURATION: 93 % | BODY MASS INDEX: 29.55 KG/M2 | DIASTOLIC BLOOD PRESSURE: 68 MMHG | HEART RATE: 90 BPM | TEMPERATURE: 97.5 F

## 2023-03-25 DIAGNOSIS — L08.9 SKIN INFECTION: Primary | ICD-10-CM

## 2023-03-25 PROCEDURE — 99213 OFFICE O/P EST LOW 20 MIN: CPT | Performed by: NURSE PRACTITIONER

## 2023-03-25 RX ORDER — MYCOPHENOLATE MOFETIL 500 MG/1
2 TABLET ORAL 2 TIMES DAILY
COMMUNITY
Start: 2023-01-31

## 2023-03-25 RX ORDER — CEPHALEXIN 500 MG/1
500 CAPSULE ORAL 2 TIMES DAILY
Qty: 14 CAPSULE | Refills: 0 | Status: SHIPPED | OUTPATIENT
Start: 2023-03-25 | End: 2023-04-01

## 2023-03-25 RX ORDER — NEEDLES, SAFETY 22GX1 1/2"
NEEDLE, DISPOSABLE MISCELLANEOUS
COMMUNITY
Start: 2023-01-31

## 2023-03-25 RX ORDER — METFORMIN HCL 500 MG
2 TABLET, EXTENDED RELEASE 24 HR ORAL 2 TIMES DAILY WITH MEALS
COMMUNITY
Start: 2023-02-28

## 2023-03-25 RX ORDER — MONTELUKAST SODIUM 10 MG/1
1 TABLET ORAL EVERY EVENING
COMMUNITY
Start: 2022-09-06

## 2023-03-25 ASSESSMENT — PAIN SCALES - GENERAL: PAINLEVEL: MODERATE PAIN (5)

## 2023-03-25 NOTE — NURSING NOTE
Pt presents today for possible infection near appendectomy incision. Incision was infected port-op and pt had 10 days of abx. Pt used bandaids and that make skin break out, and pt is concerned that the outbreak is now infected

## 2023-03-25 NOTE — PROGRESS NOTES
Assessment & Plan   Problem List Items Addressed This Visit    None  Visit Diagnoses     Skin infection    -  Primary    Relevant Medications    cephALEXin (KEFLEX) 500 MG capsule         Does appear that she is having some superficial skin infection to previous surgical wounds.  I also suspect there is some irritation related to antibiotic ointment and bandages.  I recommend that she stop this treatment, start Keflex twice daily for 7 days.  She should follow-up with primary care provider with any further concerns.    Prescription drug management  25 minutes spent on the date of the encounter doing chart review, history and exam, documentation and further activities per the note         No follow-ups on file.    ANISH [facility]    [facility]   Alicia is a 67 year old, presenting for the following health issues:  Infection  No flowsheet data found.  HPI     She comes in today for evaluation of possible skin infection.  She had an appendectomy on February 16.  Concerns for infection to her abdominal wounds and was placed on antibiotics on 3/10/2023 for superficial skin infection.  She had a recheck with her primary care provider on 3/21/2023 which showed healing.  She was using triple antibiotic ointment and bandages but was getting some irritation from the bandages.  She has been using gauze to the area instead.  Over the past few days she has noted increased redness and discomfort to the wounds.  She is not having any significant drainage.  Denies any fevers.      Review of Systems   See above      Objective    /68 (BP Location: Left arm, Patient Position: Sitting, Cuff Size: Adult Regular)   Pulse 90   Temp 97.5  F (36.4  C) (Tympanic)   Resp 18   Wt 75.7 kg (166 lb 12.8 oz)   SpO2 93%   BMI 29.55 kg/m    Body mass index is 29.55 kg/m .  Physical Exam   GENERAL: healthy, alert and no distress  SKIN: Left lower abdomen with 6 surgical wounds with yellow  scabbing over this.  Each 1 of these has approximately 0.5 cm of erythema surrounding this.  No drainage.  Area is tender and warm to touch.  NEURO: Normal strength and tone, mentation intact and speech normal  PSYCH: mentation appears normal, affect normal/bright

## 2023-04-28 LAB — AST SERPL-CCNC: 18 U/L (ref 10–40)

## 2023-05-06 ENCOUNTER — HEALTH MAINTENANCE LETTER (OUTPATIENT)
Age: 68
End: 2023-05-06

## 2023-06-05 ENCOUNTER — HOSPITAL ENCOUNTER (EMERGENCY)
Facility: OTHER | Age: 68
Discharge: HOME | End: 2023-06-06
Attending: FAMILY MEDICINE | Admitting: FAMILY MEDICINE
Payer: COMMERCIAL

## 2023-06-05 DIAGNOSIS — J84.10 FIBROSIS OF LUNG (H): ICD-10-CM

## 2023-06-05 DIAGNOSIS — R09.02 HYPOXIA: ICD-10-CM

## 2023-06-05 DIAGNOSIS — Z99.81 SUPPLEMENTAL OXYGEN DEPENDENT: ICD-10-CM

## 2023-06-05 PROCEDURE — C9803 HOPD COVID-19 SPEC COLLECT: HCPCS | Performed by: FAMILY MEDICINE

## 2023-06-05 PROCEDURE — 36415 COLL VENOUS BLD VENIPUNCTURE: CPT | Performed by: FAMILY MEDICINE

## 2023-06-05 PROCEDURE — 85025 COMPLETE CBC W/AUTO DIFF WBC: CPT | Performed by: FAMILY MEDICINE

## 2023-06-05 PROCEDURE — 99285 EMERGENCY DEPT VISIT HI MDM: CPT | Mod: 25 | Performed by: FAMILY MEDICINE

## 2023-06-05 PROCEDURE — 80048 BASIC METABOLIC PNL TOTAL CA: CPT | Performed by: FAMILY MEDICINE

## 2023-06-05 PROCEDURE — 84484 ASSAY OF TROPONIN QUANT: CPT | Performed by: FAMILY MEDICINE

## 2023-06-05 PROCEDURE — 93005 ELECTROCARDIOGRAM TRACING: CPT | Performed by: FAMILY MEDICINE

## 2023-06-05 PROCEDURE — 99284 EMERGENCY DEPT VISIT MOD MDM: CPT | Performed by: FAMILY MEDICINE

## 2023-06-05 ASSESSMENT — ACTIVITIES OF DAILY LIVING (ADL): ADLS_ACUITY_SCORE: 33

## 2023-06-06 ENCOUNTER — APPOINTMENT (OUTPATIENT)
Dept: GENERAL RADIOLOGY | Facility: OTHER | Age: 68
End: 2023-06-06
Attending: FAMILY MEDICINE
Payer: COMMERCIAL

## 2023-06-06 VITALS
HEIGHT: 63 IN | HEART RATE: 95 BPM | SYSTOLIC BLOOD PRESSURE: 138 MMHG | DIASTOLIC BLOOD PRESSURE: 72 MMHG | RESPIRATION RATE: 22 BRPM | OXYGEN SATURATION: 90 % | BODY MASS INDEX: 29.23 KG/M2 | TEMPERATURE: 98.9 F | WEIGHT: 165 LBS

## 2023-06-06 LAB
ANION GAP SERPL CALCULATED.3IONS-SCNC: 9 MMOL/L (ref 7–15)
BASOPHILS # BLD AUTO: 0 10E3/UL (ref 0–0.2)
BASOPHILS NFR BLD AUTO: 0 %
BUN SERPL-MCNC: 11.4 MG/DL (ref 8–23)
CALCIUM SERPL-MCNC: 9.9 MG/DL (ref 8.8–10.2)
CHLORIDE SERPL-SCNC: 95 MMOL/L (ref 98–107)
CREAT SERPL-MCNC: 0.37 MG/DL (ref 0.51–0.95)
DEPRECATED HCO3 PLAS-SCNC: 30 MMOL/L (ref 22–29)
EOSINOPHIL # BLD AUTO: 0.2 10E3/UL (ref 0–0.7)
EOSINOPHIL NFR BLD AUTO: 2 %
ERYTHROCYTE [DISTWIDTH] IN BLOOD BY AUTOMATED COUNT: 13.8 % (ref 10–15)
FLUAV RNA SPEC QL NAA+PROBE: NEGATIVE
FLUBV RNA RESP QL NAA+PROBE: NEGATIVE
GFR SERPL CREATININE-BSD FRML MDRD: >90 ML/MIN/1.73M2
GLUCOSE SERPL-MCNC: 180 MG/DL (ref 70–99)
HCT VFR BLD AUTO: 34.4 % (ref 35–47)
HGB BLD-MCNC: 11.1 G/DL (ref 11.7–15.7)
HOLD SPECIMEN: NORMAL
IMM GRANULOCYTES # BLD: 0.1 10E3/UL
IMM GRANULOCYTES NFR BLD: 1 %
LYMPHOCYTES # BLD AUTO: 1.2 10E3/UL (ref 0.8–5.3)
LYMPHOCYTES NFR BLD AUTO: 11 %
MCH RBC QN AUTO: 32.4 PG (ref 26.5–33)
MCHC RBC AUTO-ENTMCNC: 32.3 G/DL (ref 31.5–36.5)
MCV RBC AUTO: 100 FL (ref 78–100)
MONOCYTES # BLD AUTO: 1 10E3/UL (ref 0–1.3)
MONOCYTES NFR BLD AUTO: 10 %
NEUTROPHILS # BLD AUTO: 7.9 10E3/UL (ref 1.6–8.3)
NEUTROPHILS NFR BLD AUTO: 76 %
NRBC # BLD AUTO: 0 10E3/UL
NRBC BLD AUTO-RTO: 0 /100
PLATELET # BLD AUTO: 234 10E3/UL (ref 150–450)
POTASSIUM SERPL-SCNC: 4 MMOL/L (ref 3.4–5.3)
RBC # BLD AUTO: 3.43 10E6/UL (ref 3.8–5.2)
RSV RNA SPEC NAA+PROBE: NEGATIVE
SARS-COV-2 RNA RESP QL NAA+PROBE: NEGATIVE
SODIUM SERPL-SCNC: 134 MMOL/L (ref 136–145)
TROPONIN T SERPL HS-MCNC: 7 NG/L
WBC # BLD AUTO: 10.4 10E3/UL (ref 4–11)

## 2023-06-06 PROCEDURE — 93010 ELECTROCARDIOGRAM REPORT: CPT | Performed by: INTERNAL MEDICINE

## 2023-06-06 PROCEDURE — 71045 X-RAY EXAM CHEST 1 VIEW: CPT | Mod: TC

## 2023-06-06 PROCEDURE — 87637 SARSCOV2&INF A&B&RSV AMP PRB: CPT | Performed by: FAMILY MEDICINE

## 2023-06-06 ASSESSMENT — ENCOUNTER SYMPTOMS
FEVER: 0
COUGH: 1
SHORTNESS OF BREATH: 0
APPETITE CHANGE: 1
CHEST TIGHTNESS: 1

## 2023-06-06 ASSESSMENT — ACTIVITIES OF DAILY LIVING (ADL)
ADLS_ACUITY_SCORE: 35
ADLS_ACUITY_SCORE: 35

## 2023-06-06 NOTE — DISCHARGE INSTRUCTIONS
Alicia    I do not see evidence of pneumonia based on your history, labs and xray.    I recommend that you wear oxygen during the day as needed.     Thank you for choosing our Emergency Department for your care.     You may receive a phone call or letter for a survey about your care in our ED.  Please complete this as this is how we improve care for our patients.     If you have any questions after leaving the ED you can call or text me on my cell phone at [phone] and I will get back to you at some point. This does not mean that I am on call and if you are not doing well please return to the ED.     Sincerely,    Dr Fran Montenegro M.D.

## 2023-06-06 NOTE — ED PROVIDER NOTES
History     Chief Complaint   Patient presents with     Cough     Shortness of Breath     The history is provided by the patient and the spouse.     Alicia Becker is a 67 year old female who has chest tightness and cough with decreased appetite for the past three days or so. No fever, no N/V/D, no SOB. She has pHTN and lung fibrosis, is on daily steroids and immunocompromised from that so they are worried she is getting sick. She uses 3 L oxygen at home at night only, although she has been on it now during this past day and evening.     Allergies:  Allergies   Allergen Reactions     Ciprofloxacin      Other reaction(s): Seizures     Levofloxacin      Other reaction(s): Seizures     Quinolones      Other reaction(s): Seizures     Nsaids Other (See Comments)     Sulfa Antibiotics      Other reaction(s): *Unknown     Carbamazepine      Other reaction(s): Other - Describe In Comment Field  Ineffective for seizure control.     Levetiracetam      Other reaction(s): Other - Describe In Comment Field  Ineffective at 750 mg BID for seizure control.      Lisinopril      Other reaction(s): Yeast Infection     Lorazepam Other (See Comments)     Needed so much to stop seizures that she couldn't function.         Niacin      Other reaction(s): *Unknown  Persistant flushing     Valproic Acid      Other reaction(s): Other - Describe In Comment Field  Ineffective for seizure control.        Problem List:    Patient Active Problem List    Diagnosis Date Noted     Acute appendicitis with localized peritonitis 02/16/2023     Priority: Medium     Low serum cortisol level 02/24/2022     Priority: Medium     Pulmonary hypertension (H) 01/24/2019     Priority: Medium     Community acquired bacterial pneumonia 01/23/2019     Priority: Medium     Community acquired pneumonia, unspecified laterality 01/23/2019     Priority: Medium     Closed fracture of distal end of left radius with routine healing 12/27/2016     Priority: Medium      Cerebral hyponatremia 12/25/2016     Priority: Medium     Immunocompromised patient (H) 12/25/2016     Priority: Medium     Long term current use of systemic steroids 12/25/2016     Priority: Medium     Seizure disorder (H) 12/25/2016     Priority: Medium     Weakness of left side of body 12/23/2016     Priority: Medium     Closed Colles' fracture of left radius 12/21/2016     Priority: Medium     Community acquired pneumonia 06/07/2016     Priority: Medium     Fibrosis of lung (H) 06/07/2016     Priority: Medium     Hypoxia 06/07/2016     Priority: Medium     Ischemic stroke (H) 02/11/2016     Priority: Medium     Polyneuropathy in diseases classified elsewhere (H) 02/11/2016     Priority: Medium     Abnormal chest sounds 01/29/2015     Priority: Medium     Overview:   Overview:   Rhonchi heard on exam when patient is asymptomatic and has normal CXR.       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   BP at goal.   On losartan.   On ASA.   microalbumin 2015  A1C 6.9 2015  Eye Exam 10/2014: no retinopathy.  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 2015 - follow up pulm nodules - if stable no further chest images necessary.       Generalized seizure (H) 03/13/2012     Priority: Medium     Overview:   Overview:   Needs neurology follow up in March - July 2015 with new neurologist.       Essential (primary) hypertension 05/30/2008     Priority: Medium     Overview:   Overview:   IMO Update       Hypercholesterolemia 05/30/2008     Priority: Medium     Sleep apnea 05/30/2008     Priority: Medium        Past Medical History:    No past medical history on file.    Past Surgical History:    Past Surgical History:   Procedure Laterality Date     AS FLEX SIGMOIDOSCOPY W BIOPSY  01/24/2014    [facility], results not in chart     COLONOSCOPY  02/03/2014    10 year fu 2/3/24     LAPAROSCOPIC APPENDECTOMY N/A 2/16/2023    Procedure: APPENDECTOMY, LAPAROSCOPIC;  Surgeon: Reza Emanuel MD;  Location:  OR       Family History:    No family history on file.    Social History:  Marital Status:   [2]  Social History     Tobacco Use     Smoking status: Former     Passive exposure: Past     Smokeless tobacco: Never   Vaping Use     Vaping status: Never Used   Substance Use Topics     Alcohol use: No     Drug use: No     Comment: Drug use: No        Medications:    acetaminophen (TYLENOL) 325 MG tablet  acetaminophen (TYLENOL) 500 MG tablet  albuterol (PROAIR HFA/PROVENTIL HFA/VENTOLIN HFA) 108 (90 Base) MCG/ACT inhaler  alendronate (FOSAMAX) 70 MG tablet  amLODIPine (NORVASC) 5 MG tablet  ascorbic acid (VITAMIN C) 500 MG tablet  aspirin 81 MG chewable tablet  BD SAFETYGLIDE SYRINGE/NEEDLE 27G X 5/8\" 1 ML MISC  blood glucose (NO BRAND SPECIFIED) test strip  blood glucose monitoring (GOOD MICROLET) lancets  calcium carbonate-vitamin D (OYSTER SHELL CALCIUM/D) 500-200 MG-UNIT tablet  clotrimazole (LOTRIMIN) 1 % external cream  cyanocobalamin (VITAMIN B-12) 1000 MCG tablet  folic acid (FOLVITE) 1 MG " "tablet  furosemide (LASIX) 20 MG tablet  glipiZIDE (GLUCOTROL) 5 MG tablet  glucagon 1 MG kit  ipratropium - albuterol 0.5 mg/2.5 mg/3 mL (DUONEB) 0.5-2.5 (3) MG/3ML neb solution  lacosamide (VIMPAT) 200 MG TABS tablet  losartan (COZAAR) 100 MG tablet  metFORMIN (GLUCOPHAGE XR) 500 MG 24 hr tablet  metFORMIN (GLUCOPHAGE XR) 500 MG 24 hr tablet  methimazole (TAPAZOLE) 5 MG tablet  methotrexate 250 MG/10ML SOLN injection  montelukast (SINGULAIR) 10 MG tablet  montelukast (SINGULAIR) 10 MG tablet  multivitamin w/minerals (THERA-VIT-M) tablet  mupirocin (BACTROBAN) 2 % external ointment  mycophenolate (GENERIC EQUIVALENT) 500 MG tablet  mycophenolate (GENERIC EQUIVALENT) 500 MG tablet  omega 3 1000 MG CAPS  OXcarbazepine (TRILEPTAL) 600 MG tablet  oxyCODONE (ROXICODONE) 5 MG tablet  potassium chloride SA (K-DUR/KLOR-CON M) 20 MEQ CR tablet  predniSONE (DELTASONE) 1 MG tablet  predniSONE (DELTASONE) 5 MG tablet  Saline (SODIUM CHLORIDE) 0.65 % SOLN  senna-docusate (SENOKOT-S/PERICOLACE) 8.6-50 MG tablet  simvastatin (ZOCOR) 20 MG tablet  sodium chloride 1 GM tablet  traMADol (ULTRAM) 50 MG tablet  triamcinolone (KENALOG) 0.1 % external ointment  Tuberculin-Allergy Syringes 25G X 5/8\" 1 ML MISC  vitamin E (TOCOPHEROL) 400 units (180 mg) capsule      Review of Systems   Constitutional: Positive for appetite change. Negative for fever.   Respiratory: Positive for cough and chest tightness. Negative for shortness of breath.        Physical Exam   BP: (!) 152/85  Pulse: 106  Temp: 98.9  F (37.2  C)  Resp: 22  Height: 160 cm (5' 3\")  Weight: 74.8 kg (165 lb)  SpO2: (!) 88 % (to 90%)      Physical Exam  Vitals and nursing note reviewed.   Constitutional:       General: She is not in acute distress.     Appearance: She is well-developed. She is not ill-appearing, toxic-appearing or diaphoretic.   Cardiovascular:      Rate and Rhythm: Normal rate and regular rhythm.      Pulses: Normal pulses.      Heart sounds: Normal heart " sounds.   Pulmonary:      Effort: Pulmonary effort is normal.      Breath sounds: Normal breath sounds.      Comments: She is on 3 L oxygen here.  Neurological:      Mental Status: She is alert.       EKG: NSR, rate 97, normal axis    Results for orders placed or performed during the hospital encounter of 06/05/23 (from the past 24 hour(s))   Extra Tube ([place] Draw)    Narrative    The following orders were created for panel order Extra Tube ([place] Draw).  Procedure                               Abnormality         Status                     ---------                               -----------         ------                     Extra Blue Top Tube[[id number]]                              Final result               Extra Red Top Tube[[id number]]                               Final result               Extra Green Top (Lithium...[[id number]]                      Final result               Extra Purple Top Tube[[id number]]                            Final result               Extra Heparinized Syringe[[id number]]                        Final result                 Please view results for these tests on the individual orders.   Extra Blue Top Tube   Result Value Ref Range    Hold Specimen JIC    Extra Red Top Tube   Result Value Ref Range    Hold Specimen JIC    Extra Green Top (Lithium Heparin) Tube   Result Value Ref Range    Hold Specimen JIC    Extra Purple Top Tube   Result Value Ref Range    Hold Specimen JIC    Extra Heparinized Syringe   Result Value Ref Range    Hold Specimen JIC    CBC with platelets differential    Narrative    The following orders were created for panel order CBC with platelets differential.  Procedure                               Abnormality         Status                     ---------                               -----------         ------                     CBC with platelets and d...[[id number]]  Abnormal            Final result                 Please view results for these tests on the  individual orders.   Basic metabolic panel   Result Value Ref Range    Sodium 134 (L) 136 - 145 mmol/L    Potassium 4.0 3.4 - 5.3 mmol/L    Chloride 95 (L) 98 - 107 mmol/L    Carbon Dioxide (CO2) 30 (H) 22 - 29 mmol/L    Anion Gap 9 7 - 15 mmol/L    Urea Nitrogen 11.4 8.0 - 23.0 mg/dL    Creatinine 0.37 (L) 0.51 - 0.95 mg/dL    Calcium 9.9 8.8 - 10.2 mg/dL    Glucose 180 (H) 70 - 99 mg/dL    GFR Estimate >90 >60 mL/min/1.73m2   CBC with platelets and differential   Result Value Ref Range    WBC Count 10.4 4.0 - 11.0 10e3/uL    RBC Count 3.43 (L) 3.80 - 5.20 10e6/uL    Hemoglobin 11.1 (L) 11.7 - 15.7 g/dL    Hematocrit 34.4 (L) 35.0 - 47.0 %     78 - 100 fL    MCH 32.4 26.5 - 33.0 pg    MCHC 32.3 31.5 - 36.5 g/dL    RDW 13.8 10.0 - 15.0 %    Platelet Count 234 150 - 450 10e3/uL    % Neutrophils 76 %    % Lymphocytes 11 %    % Monocytes 10 %    % Eosinophils 2 %    % Basophils 0 %    % Immature Granulocytes 1 %    NRBCs per 100 WBC 0 <1 /100    Absolute Neutrophils 7.9 1.6 - 8.3 10e3/uL    Absolute Lymphocytes 1.2 0.8 - 5.3 10e3/uL    Absolute Monocytes 1.0 0.0 - 1.3 10e3/uL    Absolute Eosinophils 0.2 0.0 - 0.7 10e3/uL    Absolute Basophils 0.0 0.0 - 0.2 10e3/uL    Absolute Immature Granulocytes 0.1 <=0.4 10e3/uL    Absolute NRBCs 0.0 10e3/uL   Troponin T, High Sensitivity   Result Value Ref Range    Troponin T, High Sensitivity 7 <=14 ng/L   Symptomatic Influenza A/B, RSV, & SARS-CoV2 PCR (COVID-19) Nose    Specimen: Nose; Swab   Result Value Ref Range    Influenza A PCR Negative Negative    Influenza B PCR Negative Negative    RSV PCR Negative Negative    SARS CoV2 PCR Negative Negative    Narrative    Testing was performed using the [employer] Xpress CoV2/Flu/RSV Assay on the Cepheid GeneXpert Instrument. This test should be ordered for the detection of SARS-CoV-2, influenza, and RSV viruses in individuals who meet clinical and/or epidemiological criteria. Test performance is unknown in asymptomatic patients.  This test is for in vitro diagnostic use under the FDA EUA for laboratories certified under CLIA to perform high or moderate complexity testing. This test has not been FDA cleared or approved. A negative result does not rule out the presence of PCR inhibitors in the specimen or target RNA in concentration below the limit of detection for the assay. If only one viral target is positive but coinfection with multiple targets is suspected, the sample should be re-tested with another FDA cleared, approved, or authorized test, if coinfection would change clinical management. This test was validated by the [facility]. These laboratories are certified under the Clinical Laboratory Improvement Amendments of 1988 (CLIA-88) as qualified to perform high complexity laboratory testing.   XR Chest Port 1 View    Narrative    PROCEDURE INFORMATION:   Exam: XR Chest   Exam date and time: 6/6/2023 12:38 AM   Age: 67 years old   Clinical indication: Other: Cough, chest tightness     TECHNIQUE:   Imaging protocol: Radiologic exam of the chest.   Views: 1 view.     COMPARISON:   CT CHEST PULMONARY EMBOLISM W CONTRAST 1/23/2019 3:51 PM     FINDINGS:   Lungs: Low lung volumes. Linear right lung base opacities most consistent with   atelectasis. No definite consolidation but assessment limited. Hazy prominent   diffuse bilateral interstitial lung markings are nonspecific. Increased streaky   left lung base infrahilar bronchovascular markings are nonspecific.   Pleural spaces: Unremarkable. No pleural effusion. No pneumothorax.   Heart/Mediastinum: Unremarkable. No cardiomegaly.   Diaphragm: Elevated right diaphragm.   Bones/joints: Unremarkable.       Impression    IMPRESSION:   Prominent bilateral bronchovascular lung markings are nonspecific with no   definitive evidence of focal pulmonary consolidation.     THIS DOCUMENT HAS BEEN ELECTRONICALLY SIGNED BY MEGHANN VARGAS MD       Medications - No data to  "display    Assessments & Plan (with Medical Decision Making)  Alicia Becker is a 67 year old female who has chest tightness and cough with decreased appetite for the past three days or so. No fever, no N/V/D, no SOB. She has pHTN and lung fibrosis, is on daily steroids and immunocompromised from that so they are worried she is getting sick. She uses 3 L oxygen at home at night only, although she has been on it now during this past day and evening. VS in the ED /72   Pulse 95   Temp 98.9  F (37.2  C) (Temporal)   Resp 22   Ht 1.6 m (5' 3\")   Wt 74.8 kg (165 lb)   SpO2 90%   BMI 29.23 kg/m    Exam shows normal heart and lung sounds.  EKG stable.  Labs show CBC with hgb 11.1, BMP with Na 134, Cl 95, troponin 7, 4 Plex negative.  Chest xray stable.  She wants to go home and I recommend that she wear oxygen during the day as needed.      I have reviewed the nursing notes.    I have reviewed the findings, diagnosis, plan and need for follow up with the patient.     Medical Decision Making  The patient's presentation was of moderate complexity (a chronic illness mild to moderate exacerbation, progression, or side effect of treatment).    The patient's evaluation involved:  an assessment requiring an independent historian (see separate area of note for details)  ordering and/or review of 3+ test(s) in this encounter (see separate area of note for details)    The patient's management necessitated moderate risk (prescription drug management including medications given in the ED) oxygen.      Final diagnoses:   Fibrosis of lung (H)   Hypoxia   Supplemental oxygen dependent       6/5/2023   [facility] AND [facility], Johnie Pineda MD  06/06/23 0326    "

## 2023-06-06 NOTE — ED TRIAGE NOTES
"Pt is here today with her son for a cough and SOB.  She is worried about pneumonia.   Has had a cough for the past several days.  She has noticed increased work of breathing.  She is using her neb treatments and is on her prednisone.   Earlier her Od49neu 84-88%.  Pt does wear oxygen at night along with her Cpap  BP (!) 152/85   Pulse 106   Temp 98.9  F (37.2  C) (Temporal)   Resp 22   Ht 1.6 m (5' 3\")   Wt 74.8 kg (165 lb)   SpO2 (!) 88%   BMI 29.23 kg/m    Nelda Urena RN on 6/5/2023 at 11:01 PM       Triage Assessment     Row Name 06/05/23 3294       Respiratory WDL    Respiratory WDL X;rhythm/pattern;cough    Rhythm/Pattern, Respiratory shortness of breath;breathlessness    Cough Frequency frequent    Cough Type good       Skin Circulation/Temperature WDL    Skin Circulation/Temperature WDL WDL       Cardiac WDL    Cardiac WDL X;rhythm    Pulse Rate & Regularity tachycardic       Peripheral/Neurovascular WDL    Peripheral Neurovascular WDL WDL       Cognitive/Neuro/Behavioral WDL    Cognitive/Neuro/Behavioral WDL WDL       [place] Coma Scale    Best Eye Response 4-->(E4) spontaneous    Best Motor Response 6-->(M6) obeys commands    Best Verbal Response 5-->(V5) oriented    [place] Coma Scale Score 15              "

## 2023-06-07 LAB
ATRIAL RATE - MUSE: 97 BPM
DIASTOLIC BLOOD PRESSURE - MUSE: NORMAL MMHG
INTERPRETATION ECG - MUSE: NORMAL
P AXIS - MUSE: 65 DEGREES
PR INTERVAL - MUSE: 172 MS
QRS DURATION - MUSE: 78 MS
QT - MUSE: 336 MS
QTC - MUSE: 426 MS
R AXIS - MUSE: 24 DEGREES
SYSTOLIC BLOOD PRESSURE - MUSE: NORMAL MMHG
T AXIS - MUSE: 74 DEGREES
VENTRICULAR RATE- MUSE: 97 BPM

## 2023-06-16 ENCOUNTER — OFFICE VISIT (OUTPATIENT)
Dept: FAMILY MEDICINE | Facility: OTHER | Age: 68
End: 2023-06-16
Payer: COMMERCIAL

## 2023-06-16 ENCOUNTER — HOSPITAL ENCOUNTER (OUTPATIENT)
Dept: GENERAL RADIOLOGY | Facility: OTHER | Age: 68
Discharge: HOME | End: 2023-06-16
Payer: COMMERCIAL

## 2023-06-16 VITALS
DIASTOLIC BLOOD PRESSURE: 76 MMHG | TEMPERATURE: 98.7 F | OXYGEN SATURATION: 92 % | HEIGHT: 62 IN | RESPIRATION RATE: 20 BRPM | SYSTOLIC BLOOD PRESSURE: 130 MMHG | BODY MASS INDEX: 30.93 KG/M2 | HEART RATE: 102 BPM | WEIGHT: 168.1 LBS

## 2023-06-16 DIAGNOSIS — J84.10 FIBROSIS OF LUNG (H): ICD-10-CM

## 2023-06-16 DIAGNOSIS — R05.1 ACUTE COUGH: ICD-10-CM
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
## 2023-06-16 DIAGNOSIS — R06.02 SHORTNESS OF BREATH: Primary | ICD-10-CM

## 2023-06-16 PROCEDURE — 99214 OFFICE O/P EST MOD 30 MIN: CPT

## 2023-06-16 PROCEDURE — 71101 X-RAY EXAM UNILAT RIBS/CHEST: CPT | Mod: LT

## 2023-06-16 ASSESSMENT — PAIN SCALES - GENERAL: PAINLEVEL: EXTREME PAIN (8)

## 2023-06-16 NOTE — PROGRESS NOTES
ASSESSMENT/PLAN:    (R06.02) Shortness of breath  (primary encounter diagnosis); (R05.1) Acute cough; (J84.10) Fibrosis of lung (H)  Comment: Patient presents with a 2-week history of cough and some shortness of breath.  She notes cough is productive of clear sputum.  She notes her sats have been around 84% at home and has been requiring 3 L of home oxygen.  He has also been using a DuoNeb and incentive spirometer.  She has underlying pulmonary fibrosis.  On exam O2 sats are around 92% bilateral lung sounds are clear, and x-ray does not indicate a pneumonia but does show fluid overload.  She is currently on 20 mg of Lasix daily.  At this time I suspect her ongoing cough and increased oxygen needs are related to fluid on her lungs.  I recommend increasing daily Lasix to 40 mg daily x5 days.  Like her to have close follow-up with her primary care provider who is at [facility].  He is in agreement to this plan.  Patient would also like a sputum culture, she was unable to leave sample today so specimen Was sent home with her and she will drop it off tomorrow.  Plan: XR Ribs & Chest Lt 3v  Chest x-ray showed that you had some fluid on your lungs.  This is likely contributing to your shortness of breath, cough, and increased need for oxygen.  I recommend increasing the dose of Lasix you are taking to 40 mg (2 tablets) daily x 5 days.  I recommend following up with your primary care provider early next week for reevaluation.  Follow-up sooner if symptoms are worsening or you have any concerns.    Please return sputum sample when you are able.     (W19.XXXA) Fall, initial encounter  Comment: Patient had recent fall 2 days ago and reports trauma localized to chest.  She is worried about a fractured rib.  In severity obtaining chest x-ray we will add the left rib.  X-ray was negative for fracture. I recommend continued use of incentive spirometer.  Plan: XR Ribs & Chest Lt 3v    Discussed warning signs/symptoms  indicative of need to f/u    Follow up if symptoms persist or worsen or concerns    I have reviewed the nursing notes.  I have reviewed the findings, diagnosis, plan and need for follow up with the patient.    I explained my diagnostic considerations and recommendations to the patient, who voiced understanding and agreement with the treatment plan. All questions were answered. We discussed potential side effects of any prescribed or recommended therapies, as well as expectations for response to treatments.    FERNANDO HALEY, ANISH CNP  6/16/2023  5:03 PM    HPI:    Alicia Becker is a 67 year old female  who presents to [facility] today for concerns of cough.     Patient has had a cough for the past 14 days.  She notes it is productive of a clear sputum.  She notes some shortness of breath.  She is concerned because her oxygen has been as low as 84%, she has been using oxygen at home.  She has been using 3 L of home O2 as well as duoneb and IS at home.  She has underlying pulmonary fibrosis, vasculitis that she takes prednisone daily for, and is on Lasix for swelling per her report.  She has had a history of pneumonias in the past.  She was evaluated in the ED on 6/5/2023 and her chest x-ray was negative, she was instructed to continue DuoNeb and home O2.    PCP: Ene Murray.    Allergy to sulfa, fluoroquinolones, and several other none antibiotic medications.      No past medical history on file.  Past Surgical History:   Procedure Laterality Date     AS FLEX SIGMOIDOSCOPY W BIOPSY  01/24/2014    [facility], results not in chart     COLONOSCOPY  02/03/2014    10 year fu 2/3/24     LAPAROSCOPIC APPENDECTOMY N/A 2/16/2023    Procedure: APPENDECTOMY, LAPAROSCOPIC;  Surgeon: Reza Emanuel MD;  Location:  OR     Social History     Tobacco Use     Smoking status: Former     Passive exposure: Past     Smokeless tobacco: Never   Vaping Use     Vaping status: Never Used   Substance Use Topics     Alcohol use: No  "    Current Outpatient Medications   Medication Sig Dispense Refill     acetaminophen (TYLENOL) 325 MG tablet Take 2 tablets (650 mg) by mouth every 4 hours as needed for other (mild pain) 100 tablet 0     acetaminophen (TYLENOL) 500 MG tablet Take 2 tablets by mouth every night at bedtime, also may take 2 tablets by mouth every 6 hours as needed for pain. Max acetaminophen dose: 4000mg in 24 hrs.       albuterol (PROAIR HFA/PROVENTIL HFA/VENTOLIN HFA) 108 (90 Base) MCG/ACT inhaler Inhale 1-2 puffs into the lungs every 4 hours as needed for shortness of breath / dyspnea Shake before using.       alendronate (FOSAMAX) 70 MG tablet TAKE 1 TABLET BY MOUTH ONE TIME A WEEK. TAKE WITH PLAIN WATER IN THE MORNING       amLODIPine (NORVASC) 5 MG tablet Take 1 tablet by mouth daily       ascorbic acid (VITAMIN C) 500 MG tablet Take 500 mg by mouth daily        aspirin 81 MG chewable tablet Take 81 mg by mouth daily With a meal       BD SAFETYGLIDE SYRINGE/NEEDLE 27G X 5/8\" 1 ML MISC USE 1 EACH EVERY 7 DAYS. USE AS DIRECTED       blood glucose (NO BRAND SPECIFIED) test strip USE AS DIRECTED TESTING 3 TO 4 TIMES DAILY       blood glucose monitoring (GOOD MICROLET) lancets USE AS DIRECTED TESTING FOUR TIMES DAILY       calcium carbonate-vitamin D (OYSTER SHELL CALCIUM/D) 500-200 MG-UNIT tablet Take 1 tablet by mouth       clotrimazole (LOTRIMIN) 1 % external cream        cyanocobalamin (VITAMIN B-12) 1000 MCG tablet Take 1,000 mcg by mouth daily       folic acid (FOLVITE) 1 MG tablet Take 1 mg by mouth daily       furosemide (LASIX) 20 MG tablet Take 1 tablet by mouth in the morning.       glipiZIDE (GLUCOTROL) 5 MG tablet Take 2.5 mg by mouth daily       glucagon 1 MG kit Inject 1 mg into the muscle once As needed for low blood sugar       ipratropium - albuterol 0.5 mg/2.5 mg/3 mL (DUONEB) 0.5-2.5 (3) MG/3ML neb solution Inhale one neb by mouth once daily. When patient has a respiratory illness may increase to 2-3 times " daily. 1 Box 0     lacosamide (VIMPAT) 200 MG TABS tablet Take 200 mg by mouth 2 times daily       losartan (COZAAR) 100 MG tablet Take 1 tablet by mouth daily       metFORMIN (GLUCOPHAGE XR) 500 MG 24 hr tablet Take 2 tablets by mouth 2 times daily (with meals)       metFORMIN (GLUCOPHAGE XR) 500 MG 24 hr tablet Take 4 tablets (2,000 mg) by mouth 2 times daily (with meals) Restart on Sunday 2/19       methotrexate 250 MG/10ML SOLN injection ADMINISTER 1 ML UNDER THE SKIN EVERY WEEK       montelukast (SINGULAIR) 10 MG tablet Take 1 tablet by mouth every evening       montelukast (SINGULAIR) 10 MG tablet Take 10 mg by mouth daily       multivitamin w/minerals (THERA-VIT-M) tablet Take 1 tablet by mouth daily       mupirocin (BACTROBAN) 2 % external ointment APPLY SPARINGLY TOPICALLY IN EACH NOSTRIL TWICE DAILY FOR 1 MONTH       mycophenolate (GENERIC EQUIVALENT) 500 MG tablet Take 2 tablets by mouth 2 times daily       mycophenolate (GENERIC EQUIVALENT) 500 MG tablet Take 1,000 mg by mouth 2 times daily       omega 3 1000 MG CAPS Take 1 capsule by mouth 3 times daily (with meals)       OXcarbazepine (TRILEPTAL) 600 MG tablet Take 2 tablets by mouth 2 times daily       oxyCODONE (ROXICODONE) 5 MG tablet Take 1 tablet (5 mg) by mouth every 6 hours as needed for pain (Moderate to Severe) 12 tablet 0     potassium chloride SA (K-DUR/KLOR-CON M) 20 MEQ CR tablet Take 1 tablet by mouth 2 times daily (with meals) Do not crush.       predniSONE (DELTASONE) 1 MG tablet Take 2 tablets by mouth once daily with 5 mg tablet to equate 7 mg x 5 days a week and take 3 tablets with 5 mg tablet to equate 8 mg twice weekly.       predniSONE (DELTASONE) 5 MG tablet Take with 1 mg tablets once daily to equate 7 mg daily x 5 days per week and 8 mg x 2 days per week.       Saline (SODIUM CHLORIDE) 0.65 % SOLN Spray 1 spray in nostril nightly as needed For nasal dryness       senna-docusate (SENOKOT-S/PERICOLACE) 8.6-50 MG tablet Take 1-2  "tablets by mouth 2 times daily Take while on oral narcotics to prevent or treat constipation. 30 tablet 0     simvastatin (ZOCOR) 20 MG tablet Take 1 tablet by mouth At Bedtime       sodium chloride 1 GM tablet Take 4 tablets by mouth 3 times daily (with meals)       traMADol (ULTRAM) 50 MG tablet Take 1 tablet (50 mg) by mouth every 6 hours as needed for severe pain 15 tablet 0     triamcinolone (KENALOG) 0.1 % external ointment        Tuberculin-Allergy Syringes 25G X 5/8\" 1 ML MISC USE ONE NEW SYRINGE WITH EACH INJECTION EVERY 7 DAYS       vitamin E (TOCOPHEROL) 400 units (180 mg) capsule Take 400 Units by mouth daily        methimazole (TAPAZOLE) 5 MG tablet Take 5 mg by mouth daily (Patient not taking: Reported on 6/16/2023)       Allergies   Allergen Reactions     Ciprofloxacin      Other reaction(s): Seizures     Levofloxacin      Other reaction(s): Seizures     Quinolones      Other reaction(s): Seizures     Nsaids Other (See Comments)     Sulfa Antibiotics      Other reaction(s): *Unknown     Carbamazepine      Other reaction(s): Other - Describe In Comment Field  Ineffective for seizure control.     Levetiracetam      Other reaction(s): Other - Describe In Comment Field  Ineffective at 750 mg BID for seizure control.      Lisinopril      Other reaction(s): Yeast Infection     Lorazepam Other (See Comments)     Needed so much to stop seizures that she couldn't function.         Niacin      Other reaction(s): *Unknown  Persistant flushing     Valproic Acid      Other reaction(s): Other - Describe In Comment Field  Ineffective for seizure control.      Past medical history, past surgical history, current medications and allergies reviewed and accurate to the best of my knowledge.      ROS:  Refer to HPI    /76 (BP Location: Left arm, Patient Position: Sitting, Cuff Size: Adult Regular)   Pulse 102   Temp 98.7  F (37.1  C) (Tympanic)   Resp 20   Ht 1.58 m (5' 2.21\")   Wt 76.2 kg (168 lb 1.6 oz)   " SpO2 92%   BMI 30.54 kg/m      EXAM:  General Appearance: Well appearing 67 year old female, appropriate appearance for age. No acute distress   Ears: Left TM intact, translucent with bony landmarks appreciated, no erythema, no effusion, no bulging, no purulence.  Right TM intact, translucent with bony landmarks appreciated, no erythema, no effusion, no bulging, no purulence.  Left auditory canal clear.  Right auditory canal clear.  Normal external ears, non tender.  Eyes: conjunctivae normal without erythema or irritation, corneas clear, no drainage or crusting, no eyelid swelling, pupils equal   Oropharynx: moist mucous membranes, no erythema, no exudates or petechiae, no post nasal drip seen, no trismus, voice clear.    Sinuses:  No sinus tenderness upon palpation of the frontal or maxillary sinuses  Nose:  Bilateral nares: no erythema, no edema, no drainage or congestion   Neck: supple without adenopathy  Respiratory: normal chest wall and respirations.  Normal effort.  Clear to auscultation bilaterally, no wheezing, crackles or rhonchi.  No increased work of breathing.  No cough appreciated.  Cardiac: RRR with no murmurs  Abdomen: soft, nontender, no rigidity, no rebound tenderness or guarding, normal bowel sounds present  Musculoskeletal:  Equal movement of bilateral upper extremities.  Equal movement of bilateral lower extremities.  Normal gait.    Dermatological: no rashes noted of exposed skin  Neuro: Alert and oriented to person, place, and time.  Cranial nerves II-XII grossly intact with no focal or lateralizing deficits.  Muscle tone normal.  Gait normal. No tremor.   Psychological: normal affect, alert, oriented, and pleasant.     Labs/Xray:  Results for orders placed or performed in visit on 06/16/23   XR Ribs & Chest Lt 3v     Status: None    Narrative    Exam:  XR RIBS AND CHEST LEFT 3 VIEWS    HISTORY: Shortness of breath for 2 weeks with underlying pulmonary  fibrosis, fall with left rib injury 2  days ago.; Shortness of breath;  Fall, initial encounter; Acute cough; Fibrosis of lung (H).    COMPARISON:  6/6/2023, 1/23/2019, 1/22/2019    FINDINGS:     The cardiomediastinal contours are unchanged.      There is elevation of the right hemidiaphragm. There are diffuse  interstitial markings. There is streaky opacity in the lung bases,  right more than left. No pleural effusion or pneumothorax.      No acute rib fracture or other acute osseous abnormality.       Impression    IMPRESSION:      Findings compatible with mild CHF/fluid overload. Streaky opacities in  the lung bases are likely atelectasis or scar.      No acute rib fracture.    JASON DECKER MD         SYSTEM ID:  [id number]

## 2023-06-16 NOTE — PATIENT INSTRUCTIONS
Chest x-ray showed that you had some fluid on your lungs.  This is likely contributing to your shortness of breath, cough, and increased need for oxygen.  I recommend increasing the dose of Lasix you are taking to 40 mg (2 tablets) daily x 5 days.  I recommend following up with your primary care provider early next week for reevaluation.  Follow-up sooner if symptoms are worsening or you have any concerns.    Please return sputum sample when you are able.

## 2023-06-16 NOTE — NURSING NOTE
Chief Complaint   Patient presents with     Cough     Patient presents to the clinic for cough, since before June 5th patient stated her oxygen has been low (84%)  and she has been using oxygen at home patient stated she feels short of breath     Malia Queen LPN       Food Insecurity: Not on file     FOOD SECURITY SCREENING QUESTIONS:    The next two questions are to help us understand your food security.  If you are feeling you need any assistance in this area, we have resources available to support you today.    Hunger Vital Signs:  Within the past 12 months we worried whether our food would run out before we got money to buy more. Never  Within the past 12 months the food we bought just didn't last and we didn't have money to get more. Never  Malia Queen LPN,LPN on 6/16/2023 at 4:44 PM

## 2023-06-27 ENCOUNTER — TRANSFERRED RECORDS (OUTPATIENT)
Dept: MULTI SPECIALTY CLINIC | Facility: CLINIC | Age: 68
End: 2023-06-27

## 2023-06-27 LAB
CREATININE (EXTERNAL): 0.5 MG/DL (ref 0.4–1)
GFR ESTIMATED (EXTERNAL): 103 ML/MIN/1.73M2
GFR ESTIMATED (IF AFRICAN AMERICAN) (EXTERNAL): NORMAL ML/MIN/1.73M2
GLUCOSE (EXTERNAL): 169 MG/DL (ref 70–99)
POTASSIUM (EXTERNAL): 3.9 MMOL/L (ref 3.4–5.1)

## 2023-07-18 ENCOUNTER — HOSPITAL ENCOUNTER (OUTPATIENT)
Dept: ULTRASOUND IMAGING | Facility: OTHER | Age: 68
Discharge: HOME | End: 2023-07-18
Payer: COMMERCIAL

## 2023-07-18 ENCOUNTER — HOSPITAL ENCOUNTER (OUTPATIENT)
Dept: CT IMAGING | Facility: OTHER | Age: 68
Discharge: HOME | End: 2023-07-18
Payer: COMMERCIAL

## 2023-07-18 ENCOUNTER — OFFICE VISIT (OUTPATIENT)
Dept: FAMILY MEDICINE | Facility: OTHER | Age: 68
End: 2023-07-18
Payer: COMMERCIAL

## 2023-07-18 VITALS
DIASTOLIC BLOOD PRESSURE: 75 MMHG | OXYGEN SATURATION: 99 % | RESPIRATION RATE: 16 BRPM | BODY MASS INDEX: 29.59 KG/M2 | HEIGHT: 63 IN | WEIGHT: 167 LBS | HEART RATE: 68 BPM | TEMPERATURE: 98 F | SYSTOLIC BLOOD PRESSURE: 138 MMHG

## 2023-07-18 DIAGNOSIS — R79.89 ELEVATED D-DIMER: ICD-10-CM

## 2023-07-18 DIAGNOSIS — M79.662 PAIN OF LEFT LOWER LEG: Primary | ICD-10-CM

## 2023-07-18 LAB
ALBUMIN SERPL BCG-MCNC: 4.6 G/DL (ref 3.5–5.2)
ALP SERPL-CCNC: 91 U/L (ref 35–104)
ALT SERPL W P-5'-P-CCNC: 21 U/L (ref 0–50)
ANION GAP SERPL CALCULATED.3IONS-SCNC: 14 MMOL/L (ref 7–15)
AST SERPL W P-5'-P-CCNC: 25 U/L (ref 0–45)
BASOPHILS # BLD AUTO: 0 10E3/UL (ref 0–0.2)
BASOPHILS NFR BLD AUTO: 1 %
BILIRUB SERPL-MCNC: 0.2 MG/DL
BUN SERPL-MCNC: 12.9 MG/DL (ref 8–23)
CALCIUM SERPL-MCNC: 9.7 MG/DL (ref 8.8–10.2)
CHLORIDE SERPL-SCNC: 95 MMOL/L (ref 98–107)
CREAT SERPL-MCNC: 0.47 MG/DL (ref 0.51–0.95)
CRP SERPL-MCNC: <3 MG/L
D DIMER PPP FEU-MCNC: 1.71 UG/ML FEU (ref 0–0.5)
DEPRECATED HCO3 PLAS-SCNC: 27 MMOL/L (ref 22–29)
EOSINOPHIL # BLD AUTO: 0.1 10E3/UL (ref 0–0.7)
EOSINOPHIL NFR BLD AUTO: 2 %
ERYTHROCYTE [DISTWIDTH] IN BLOOD BY AUTOMATED COUNT: 15.3 % (ref 10–15)
ERYTHROCYTE [SEDIMENTATION RATE] IN BLOOD BY WESTERGREN METHOD: 18 MM/HR (ref 0–30)
GFR SERPL CREATININE-BSD FRML MDRD: >90 ML/MIN/1.73M2
GLUCOSE SERPL-MCNC: 247 MG/DL (ref 70–99)
HCT VFR BLD AUTO: 38 % (ref 35–47)
HGB BLD-MCNC: 12.3 G/DL (ref 11.7–15.7)
HOLD SPECIMEN: NORMAL
IMM GRANULOCYTES # BLD: 0 10E3/UL
IMM GRANULOCYTES NFR BLD: 0 %
LYMPHOCYTES # BLD AUTO: 0.8 10E3/UL (ref 0.8–5.3)
LYMPHOCYTES NFR BLD AUTO: 13 %
MCH RBC QN AUTO: 32.3 PG (ref 26.5–33)
MCHC RBC AUTO-ENTMCNC: 32.4 G/DL (ref 31.5–36.5)
MCV RBC AUTO: 100 FL (ref 78–100)
MONOCYTES # BLD AUTO: 0.4 10E3/UL (ref 0–1.3)
MONOCYTES NFR BLD AUTO: 7 %
NEUTROPHILS # BLD AUTO: 4.8 10E3/UL (ref 1.6–8.3)
NEUTROPHILS NFR BLD AUTO: 77 %
NRBC # BLD AUTO: 0 10E3/UL
NRBC BLD AUTO-RTO: 0 /100
PLATELET # BLD AUTO: 237 10E3/UL (ref 150–450)
POTASSIUM SERPL-SCNC: 4.6 MMOL/L (ref 3.4–5.3)
PROT SERPL-MCNC: 7.6 G/DL (ref 6.4–8.3)
RBC # BLD AUTO: 3.81 10E6/UL (ref 3.8–5.2)
SODIUM SERPL-SCNC: 136 MMOL/L (ref 136–145)
WBC # BLD AUTO: 6.2 10E3/UL (ref 4–11)

## 2023-07-18 PROCEDURE — 85652 RBC SED RATE AUTOMATED: CPT | Mod: ZL

## 2023-07-18 PROCEDURE — 36415 COLL VENOUS BLD VENIPUNCTURE: CPT | Mod: ZL

## 2023-07-18 PROCEDURE — 250N000011 HC RX IP 250 OP 636: Mod: JZ

## 2023-07-18 PROCEDURE — 71275 CT ANGIOGRAPHY CHEST: CPT

## 2023-07-18 PROCEDURE — 99215 OFFICE O/P EST HI 40 MIN: CPT

## 2023-07-18 PROCEDURE — 86140 C-REACTIVE PROTEIN: CPT | Mod: ZL

## 2023-07-18 PROCEDURE — 85025 COMPLETE CBC W/AUTO DIFF WBC: CPT | Mod: ZL

## 2023-07-18 PROCEDURE — 93971 EXTREMITY STUDY: CPT | Mod: LT

## 2023-07-18 PROCEDURE — 80053 COMPREHEN METABOLIC PANEL: CPT | Mod: ZL

## 2023-07-18 PROCEDURE — 85379 FIBRIN DEGRADATION QUANT: CPT | Mod: ZL

## 2023-07-18 RX ORDER — IOPAMIDOL 755 MG/ML
74 INJECTION, SOLUTION INTRAVASCULAR ONCE
Status: COMPLETED | OUTPATIENT
Start: 2023-07-18 | End: 2023-07-18

## 2023-07-18 RX ADMIN — IOPAMIDOL 74 ML: 755 INJECTION, SOLUTION INTRAVENOUS at 17:21

## 2023-07-18 ASSESSMENT — PAIN SCALES - GENERAL: PAINLEVEL: MILD PAIN (2)

## 2023-07-18 NOTE — PATIENT INSTRUCTIONS
Watch for increased shortness of breath, chest pain, dizziness, redness in you legs, swelling, tenderness to the touch, warmth

## 2023-07-18 NOTE — PROGRESS NOTES
ASSESSMENT/PLAN:    I have reviewed the nursing notes.  I have reviewed the findings, diagnosis, plan and need for follow up with the patient.    1. Pain of left lower leg  2. Elevated d-dimer  - Erythrocyte sedimentation rate auto  - CBC and Differential  - Comprehensive Metabolic Panel  - D dimer quantitative  - CRP inflammation  - US Lower Extremity Venous Duplex Left  - CT Chest Pulmonary Embolism w Contrast    Patient presents with left lower leg tenderness and edema.  She states she fell about a week ago and landed on her left knee but she denies any knee pain.  Labs are stable except D-dimer was elevated at 1.71.  Due to patient's symptoms a lower extremity ultrasound was performed to rule out a DVT.  Ultrasound was negative for DVT.  Patient has a history of recent hypervolemia.  Chest CT was completed in order to rule out a PE.  Chest CT was negative for PE but showed diffuse mosaic attenuation and scattered tree-in-bud pulmonary opacities in the lower lobes.  Discussed findings with emergency department provider.  Discussed results and findings with patient.  Discussed that her recent hypervolemia in her chest could explain her CT results.  Discussed signs and symptoms of a PE and DVT.  Advised patient that her lower extremity edema could be residual effects from her recent hypervolemia as well.  Continue wearing compression stockings as needed.  Elevate lower extremities when able.    Discussed warning signs/symptoms indicative of need to f/u    Follow up if symptoms persist or worsen or concerns    I explained my diagnostic considerations and recommendations to the patient, who voiced understanding and agreement with the treatment plan. All questions were answered. We discussed potential side effects of any prescribed or recommended therapies, as well as expectations for response to treatments.    40 minutes was spent obtaining patient history, reviewing patient's chart, performing physical exam,  ordering labs and diagnostic imaging, reviewing diagnostic results and discussing them with patient, and providing patient education.    ANISH Davey CNP  7/18/2023  3:00 PM    HPI:    Alicia Becker is a 67 year old female  who presents to [facility] today for concerns of leg pain    Patient states that she fell about a week ago while walking outside.  She states that she fell on her left knee but does not have any knee pain.  Yesterday patient noticed a small lump in the calf of her left lower leg.  She states there is some mild tenderness with palpation.  She denies any redness or bruising, or warmth with palpation.  She denies shortness of breath, chest pain, dizziness, or headaches.  She denies fever or chills.  Patient denies any prior history of blood clots but states she does have a history of stroke.  She is on daily aspirin.    History reviewed. No pertinent past medical history.  Past Surgical History:   Procedure Laterality Date     AS FLEX SIGMOIDOSCOPY W BIOPSY  01/24/2014    [facility], results not in chart     COLONOSCOPY  02/03/2014    10 year fu 2/3/24     LAPAROSCOPIC APPENDECTOMY N/A 2/16/2023    Procedure: APPENDECTOMY, LAPAROSCOPIC;  Surgeon: Reza Emanuel MD;  Location:  OR     Social History     Tobacco Use     Smoking status: Former     Passive exposure: Past     Smokeless tobacco: Never   Substance Use Topics     Alcohol use: No     Current Outpatient Medications   Medication Sig Dispense Refill     acetaminophen (TYLENOL) 325 MG tablet Take 2 tablets (650 mg) by mouth every 4 hours as needed for other (mild pain) 100 tablet 0     acetaminophen (TYLENOL) 500 MG tablet Take 2 tablets by mouth every night at bedtime, also may take 2 tablets by mouth every 6 hours as needed for pain. Max acetaminophen dose: 4000mg in 24 hrs.       albuterol (PROAIR HFA/PROVENTIL HFA/VENTOLIN HFA) 108 (90 Base) MCG/ACT inhaler Inhale 1-2 puffs into the lungs every 4 hours as needed for shortness  "of breath / dyspnea Shake before using.       alendronate (FOSAMAX) 70 MG tablet TAKE 1 TABLET BY MOUTH ONE TIME A WEEK. TAKE WITH PLAIN WATER IN THE MORNING       amLODIPine (NORVASC) 5 MG tablet Take 1 tablet by mouth daily       ascorbic acid (VITAMIN C) 500 MG tablet Take 500 mg by mouth daily        aspirin 81 MG chewable tablet Take 81 mg by mouth daily With a meal       BD SAFETYGLIDE SYRINGE/NEEDLE 27G X 5/8\" 1 ML MISC USE 1 EACH EVERY 7 DAYS. USE AS DIRECTED       blood glucose (NO BRAND SPECIFIED) test strip USE AS DIRECTED TESTING 3 TO 4 TIMES DAILY       blood glucose monitoring (GOOD MICROLET) lancets USE AS DIRECTED TESTING FOUR TIMES DAILY       calcium carbonate-vitamin D (OYSTER SHELL CALCIUM/D) 500-200 MG-UNIT tablet Take 1 tablet by mouth       clotrimazole (LOTRIMIN) 1 % external cream        cyanocobalamin (VITAMIN B-12) 1000 MCG tablet Take 1,000 mcg by mouth daily       folic acid (FOLVITE) 1 MG tablet Take 1 mg by mouth daily       furosemide (LASIX) 20 MG tablet Take 1 tablet by mouth in the morning.       glipiZIDE (GLUCOTROL) 5 MG tablet Take 2.5 mg by mouth daily       glucagon 1 MG kit Inject 1 mg into the muscle once As needed for low blood sugar       ipratropium - albuterol 0.5 mg/2.5 mg/3 mL (DUONEB) 0.5-2.5 (3) MG/3ML neb solution Inhale one neb by mouth once daily. When patient has a respiratory illness may increase to 2-3 times daily. 1 Box 0     lacosamide (VIMPAT) 200 MG TABS tablet Take 200 mg by mouth 2 times daily       losartan (COZAAR) 100 MG tablet Take 1 tablet by mouth daily       metFORMIN (GLUCOPHAGE XR) 500 MG 24 hr tablet Take 2 tablets by mouth 2 times daily (with meals)       metFORMIN (GLUCOPHAGE XR) 500 MG 24 hr tablet Take 4 tablets (2,000 mg) by mouth 2 times daily (with meals) Restart on Sunday 2/19       methimazole (TAPAZOLE) 5 MG tablet Take 5 mg by mouth daily       methotrexate 250 MG/10ML SOLN injection ADMINISTER 1 ML UNDER THE SKIN EVERY WEEK       " "montelukast (SINGULAIR) 10 MG tablet Take 1 tablet by mouth every evening       montelukast (SINGULAIR) 10 MG tablet Take 10 mg by mouth daily       multivitamin w/minerals (THERA-VIT-M) tablet Take 1 tablet by mouth daily       mupirocin (BACTROBAN) 2 % external ointment APPLY SPARINGLY TOPICALLY IN EACH NOSTRIL TWICE DAILY FOR 1 MONTH       mycophenolate (GENERIC EQUIVALENT) 500 MG tablet Take 2 tablets by mouth 2 times daily       mycophenolate (GENERIC EQUIVALENT) 500 MG tablet Take 1,000 mg by mouth 2 times daily       omega 3 1000 MG CAPS Take 1 capsule by mouth 3 times daily (with meals)       OXcarbazepine (TRILEPTAL) 600 MG tablet Take 2 tablets by mouth 2 times daily       oxyCODONE (ROXICODONE) 5 MG tablet Take 1 tablet (5 mg) by mouth every 6 hours as needed for pain (Moderate to Severe) 12 tablet 0     potassium chloride SA (K-DUR/KLOR-CON M) 20 MEQ CR tablet Take 1 tablet by mouth 2 times daily (with meals) Do not crush.       predniSONE (DELTASONE) 1 MG tablet Take 2 tablets by mouth once daily with 5 mg tablet to equate 7 mg x 5 days a week and take 3 tablets with 5 mg tablet to equate 8 mg twice weekly.       predniSONE (DELTASONE) 5 MG tablet Take with 1 mg tablets once daily to equate 7 mg daily x 5 days per week and 8 mg x 2 days per week.       Saline (SODIUM CHLORIDE) 0.65 % SOLN Spray 1 spray in nostril nightly as needed For nasal dryness       senna-docusate (SENOKOT-S/PERICOLACE) 8.6-50 MG tablet Take 1-2 tablets by mouth 2 times daily Take while on oral narcotics to prevent or treat constipation. 30 tablet 0     simvastatin (ZOCOR) 20 MG tablet Take 1 tablet by mouth At Bedtime       sodium chloride 1 GM tablet Take 4 tablets by mouth 3 times daily (with meals)       traMADol (ULTRAM) 50 MG tablet Take 1 tablet (50 mg) by mouth every 6 hours as needed for severe pain 15 tablet 0     triamcinolone (KENALOG) 0.1 % external ointment        Tuberculin-Allergy Syringes 25G X 5/8\" 1 ML MISC USE " "ONE NEW SYRINGE WITH EACH INJECTION EVERY 7 DAYS       vitamin E (TOCOPHEROL) 400 units (180 mg) capsule Take 400 Units by mouth daily        Allergies   Allergen Reactions     Ciprofloxacin      Other reaction(s): Seizures     Levofloxacin      Other reaction(s): Seizures     Quinolones      Other reaction(s): Seizures     Nsaids Other (See Comments)     Sulfa Antibiotics      Other reaction(s): *Unknown     Carbamazepine      Other reaction(s): Other - Describe In Comment Field  Ineffective for seizure control.     Levetiracetam      Other reaction(s): Other - Describe In Comment Field  Ineffective at 750 mg BID for seizure control.      Lisinopril      Other reaction(s): Yeast Infection     Lorazepam Other (See Comments)     Needed so much to stop seizures that she couldn't function.         Niacin      Other reaction(s): *Unknown  Persistant flushing     Valproic Acid      Other reaction(s): Other - Describe In Comment Field  Ineffective for seizure control.      Past medical history, past surgical history, current medications and allergies reviewed and accurate to the best of my knowledge.      ROS:  Refer to HPI    /75   Pulse 68   Temp 98  F (36.7  C) (Tympanic)   Resp 16   Ht 1.6 m (5' 3\")   Wt 75.8 kg (167 lb)   SpO2 99%   BMI 29.58 kg/m      EXAM:  General Appearance: Well appearing 67 year old female, appropriate appearance for age. No acute distress   Neck: supple without adenopathy  Respiratory: normal chest wall and respirations.  Normal effort.  Clear to auscultation bilaterally, no wheezing, crackles or rhonchi.  No increased work of breathing.  No cough appreciated.  Cardiac: RRR with no murmurs  Musculoskeletal:  Equal movement of bilateral upper extremities.  Equal movement of bilateral lower extremities.  Normal gait. Small lump noted of posterior left lower extremity, mild tenderness with palpation, no erythema or ecchymosis noted, patient is able to wiggle her toes, 2+ pedal pulse, " skin is intact  Dermatological: no rashes noted of exposed skin  Neuro: Alert and oriented to person, place, and time.  Cranial nerves II-XII grossly intact with no focal or lateralizing deficits.  Muscle tone normal.  Gait normal. No tremor.   Psychological: normal affect, alert, oriented, and pleasant.     Labs:  Results for orders placed or performed in visit on 07/18/23   US Lower Extremity Venous Duplex Left     Status: None    Narrative    Procedure: US LOWER EXTREMITY VENOUS DUPLEX LEFT    HISTORY:  r/o dvt; Pain of left lower leg; Elevated d-dimer.    TECHNIQUE: Grayscale, color Doppler and compression views of the deep  venous structures of the left lower extremity.    COMPARISON: Knee MRI 7/16/2022.    FINDINGS:    Interrogation of the deep venous structures from the left common  femoral vein through the veins of the proximal calf demonstrate normal  grayscale appearance, compressibility and augmentable color Doppler  flow.    Consider follow-up knee radiographs.      Impression    IMPRESSION:     No evidence of left lower extremity DVT.      WANDER ZHANG MD         SYSTEM ID:  [id number]   CT Chest Pulmonary Embolism w Contrast     Status: None    Narrative    PROCEDURE:  CT CHEST PULMONARY EMBOLISM W CONTRAST.    HISTORY:  Evaluate for pulmonary embolism.  elevated ddimer, r/o PE;  Elevated d-dimer    TECHNIQUE:  Initial pre-contrast  and localizer images were  obtained.  Contrast enhanced helical CT pulmonary angiography was then  performed.  Routine transaxial and post-processed (multiplanar and/or  MIP) reformations were obtained. This CT exam was performed using one  or more the following dose reduction techniques: automated exposure  control, adjustment of the mA and/or kV according to patient size,  and/or iterative reconstruction technique.    COMPARISON:  1/23/2019    MEDS/CONTRAST: isovue 370, 74ML     PULMONARY CTA FINDINGS:  This is a diagnostic quality helical CT  pulmonary  angiogram.  There is no acute pulmonary embolism to the  first subsegmental pulmonary artery level.    OTHER FINDINGS:      Cardiac size is unchanged. Coronary calcifications are seen. There is  no pericardial or pleural effusion. No abnormal thoracic adenopathy is  identified.    Limited views of the upper abdomen reveal no adrenal mass or acute  process.     There is chronic elevation the right hemidiaphragm, associated with  scarring at the right lung base. The pleura is without thickening or  effusions. The central airways are unremarkable. Diffuse mosaic  attenuation is seen. Scattered tree-in-bud pulmonary opacities are  seen in the lower lobes.    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 There is also T10 and T12 mild vertebral  body height loss, also chronic.      Impression    IMPRESSION:    No acute pulmonary emboli to the subsegmental level.    Findings suggest subtle bronchopneumonia. A component of air trapping  is suggested by mosaic attenuation. Recommend follow-up.    WANDER ZHANG MD         SYSTEM ID:  [id number]   Erythrocyte sedimentation rate auto     Status: Normal   Result Value Ref Range    Erythrocyte Sedimentation Rate 18 0 - 30 mm/hr   Comprehensive Metabolic Panel     Status: Abnormal   Result Value Ref Range    Sodium 136 136 - 145 mmol/L    Potassium 4.6 3.4 - 5.3 mmol/L    Chloride 95 (L) 98 - 107 mmol/L    Carbon Dioxide (CO2) 27 22 - 29 mmol/L    Anion Gap 14 7 - 15 mmol/L    Urea Nitrogen 12.9 8.0 - 23.0 mg/dL    Creatinine 0.47 (L) 0.51 - 0.95 mg/dL    Calcium 9.7 8.8 - 10.2 mg/dL    Glucose 247 (H) 70 - 99 mg/dL    Alkaline Phosphatase 91 35 - 104 U/L    AST 25 0 - 45 U/L    ALT 21 0 - 50 U/L    Protein Total 7.6 6.4 - 8.3 g/dL    Albumin 4.6 3.5 - 5.2 g/dL    Bilirubin Total 0.2 <=1.2 mg/dL    GFR Estimate >90 >60 mL/min/1.73m2   D dimer quantitative     Status: Abnormal   Result Value Ref Range    D-Dimer Quantitative 1.71  (H) 0.00 - 0.50 ug/mL FEU    Narrative    This D-dimer assay is intended for use in conjunction with a clinical pretest probability assessment model to exclude pulmonary embolism (PE) and deep venous thrombosis (DVT) in outpatients suspected of PE or DVT. The cut-off value is 0.50 ug/mL FEU.   CRP inflammation     Status: Normal   Result Value Ref Range    CRP Inflammation <3.00 <5.00 mg/L   Extra Tube     Status: None    Narrative    The following orders were created for panel order Extra Tube.  Procedure                               Abnormality         Status                     ---------                               -----------         ------                     Extra Serum Separator Tu...[[id number]]                      Final result                 Please view results for these tests on the individual orders.   CBC with platelets and differential     Status: Abnormal   Result Value Ref Range    WBC Count 6.2 4.0 - 11.0 10e3/uL    RBC Count 3.81 3.80 - 5.20 10e6/uL    Hemoglobin 12.3 11.7 - 15.7 g/dL    Hematocrit 38.0 35.0 - 47.0 %     78 - 100 fL    MCH 32.3 26.5 - 33.0 pg    MCHC 32.4 31.5 - 36.5 g/dL    RDW 15.3 (H) 10.0 - 15.0 %    Platelet Count 237 150 - 450 10e3/uL    % Neutrophils 77 %    % Lymphocytes 13 %    % Monocytes 7 %    % Eosinophils 2 %    % Basophils 1 %    % Immature Granulocytes 0 %    NRBCs per 100 WBC 0 <1 /100    Absolute Neutrophils 4.8 1.6 - 8.3 10e3/uL    Absolute Lymphocytes 0.8 0.8 - 5.3 10e3/uL    Absolute Monocytes 0.4 0.0 - 1.3 10e3/uL    Absolute Eosinophils 0.1 0.0 - 0.7 10e3/uL    Absolute Basophils 0.0 0.0 - 0.2 10e3/uL    Absolute Immature Granulocytes 0.0 <=0.4 10e3/uL    Absolute NRBCs 0.0 10e3/uL   Extra Serum Separator Tube (SST)     Status: None   Result Value Ref Range    Hold Specimen x    CBC and Differential     Status: Abnormal    Narrative    The following orders were created for panel order CBC and Differential.  Procedure                                Abnormality         Status                     ---------                               -----------         ------                     CBC with platelets and d...[[id number]]  Abnormal            Final result                 Please view results for these tests on the individual orders.

## 2023-07-18 NOTE — PROGRESS NOTES
1.  Has the patient had a previous reaction to IV contrast? no    2.  Does the patient have kidney disease? no    3.  Is the patient on dialysis? no    If YES to any of these questions, exam will be reviewed with a Radiologist before administering contrast.  IV Contrast- Discharge Instructions After Your CT Scan      The IV contrast you received today will be filtered from your bloodstream by your kidneys during the next 24 hours and pass from the body in urine.  You will not be aware of this process and your urine will not change in color.  To help this process you should drink at least 4 additional glasses of water or juice today.  This reduces stress on your kidneys.    Most contrast reactions are immediate.  Should you develop symptoms of concern after discharge, contact the department at the number below.  After hours you should contact your personal physician.  If you develop breathing distress or wheezing, call 911.

## 2023-08-16 LAB — HBA1C MFR BLD: 7.5 % (ref 4–5.6)

## 2023-10-07 ENCOUNTER — HEALTH MAINTENANCE LETTER (OUTPATIENT)
Age: 68
End: 2023-10-07

## 2023-10-12 ENCOUNTER — APPOINTMENT (OUTPATIENT)
Dept: GENERAL RADIOLOGY | Facility: OTHER | Age: 68
End: 2023-10-12
Attending: FAMILY MEDICINE
Payer: MEDICARE

## 2023-10-12 ENCOUNTER — HOSPITAL ENCOUNTER (EMERGENCY)
Facility: OTHER | Age: 68
Discharge: HOME | End: 2023-10-12
Attending: FAMILY MEDICINE | Admitting: FAMILY MEDICINE
Payer: MEDICARE

## 2023-10-12 VITALS
WEIGHT: 168 LBS | HEIGHT: 63 IN | TEMPERATURE: 98.6 F | HEART RATE: 98 BPM | SYSTOLIC BLOOD PRESSURE: 152 MMHG | RESPIRATION RATE: 36 BRPM | BODY MASS INDEX: 29.77 KG/M2 | OXYGEN SATURATION: 94 % | DIASTOLIC BLOOD PRESSURE: 102 MMHG

## 2023-10-12 DIAGNOSIS — R09.02 HYPOXIA: ICD-10-CM

## 2023-10-12 PROBLEM — M20.42 ACQUIRED HAMMER TOE OF LEFT FOOT: Status: ACTIVE | Noted: 2018-10-15

## 2023-10-12 PROBLEM — R68.89 ABNORMAL ENDOCRINE LABORATORY TEST FINDING: Chronic | Status: ACTIVE | Noted: 2018-12-04

## 2023-10-12 PROBLEM — M81.0 AGE-RELATED OSTEOPOROSIS WITHOUT CURRENT PATHOLOGICAL FRACTURE: Status: ACTIVE | Noted: 2023-02-17

## 2023-10-12 PROBLEM — I77.82 ANCA-POSITIVE VASCULITIS (H): Status: ACTIVE | Noted: 2017-03-06

## 2023-10-12 LAB
ALBUMIN SERPL BCG-MCNC: 4.1 G/DL (ref 3.5–5.2)
ALP SERPL-CCNC: 79 U/L (ref 35–104)
ALT SERPL W P-5'-P-CCNC: 16 U/L (ref 0–50)
ANION GAP SERPL CALCULATED.3IONS-SCNC: 9 MMOL/L (ref 7–15)
AST SERPL W P-5'-P-CCNC: 19 U/L (ref 0–45)
BASO+EOS+MONOS # BLD AUTO: NORMAL 10*3/UL
BASO+EOS+MONOS NFR BLD AUTO: NORMAL %
BASOPHILS # BLD AUTO: 0 10E3/UL (ref 0–0.2)
BASOPHILS NFR BLD AUTO: 1 %
BILIRUB SERPL-MCNC: <0.2 MG/DL
BUN SERPL-MCNC: 9.7 MG/DL (ref 8–23)
CALCIUM SERPL-MCNC: 9.5 MG/DL (ref 8.8–10.2)
CHLORIDE SERPL-SCNC: 95 MMOL/L (ref 98–107)
CREAT SERPL-MCNC: 0.47 MG/DL (ref 0.51–0.95)
CRP SERPL-MCNC: 18.98 MG/L
DEPRECATED HCO3 PLAS-SCNC: 32 MMOL/L (ref 22–29)
EGFRCR SERPLBLD CKD-EPI 2021: >90 ML/MIN/1.73M2
EOSINOPHIL # BLD AUTO: 0.2 10E3/UL (ref 0–0.7)
EOSINOPHIL NFR BLD AUTO: 4 %
ERYTHROCYTE [DISTWIDTH] IN BLOOD BY AUTOMATED COUNT: 14.6 % (ref 10–15)
ERYTHROCYTE [SEDIMENTATION RATE] IN BLOOD BY WESTERGREN METHOD: 45 MM/HR (ref 0–30)
FLUAV RNA SPEC QL NAA+PROBE: NEGATIVE
FLUBV RNA RESP QL NAA+PROBE: NEGATIVE
GLUCOSE SERPL-MCNC: 155 MG/DL (ref 70–99)
HCT VFR BLD AUTO: 38 % (ref 35–47)
HGB BLD-MCNC: 12.4 G/DL (ref 11.7–15.7)
HOLD SPECIMEN: NORMAL
HOLD SPECIMEN: NORMAL
IMM GRANULOCYTES # BLD: 0 10E3/UL
IMM GRANULOCYTES NFR BLD: 0 %
LACTATE SERPL-SCNC: 2 MMOL/L (ref 0.7–2)
LYMPHOCYTES # BLD AUTO: 1.1 10E3/UL (ref 0.8–5.3)
LYMPHOCYTES NFR BLD AUTO: 17 %
MCH RBC QN AUTO: 32.3 PG (ref 26.5–33)
MCHC RBC AUTO-ENTMCNC: 32.6 G/DL (ref 31.5–36.5)
MCV RBC AUTO: 99 FL (ref 78–100)
MONOCYTES # BLD AUTO: 0.6 10E3/UL (ref 0–1.3)
MONOCYTES NFR BLD AUTO: 10 %
NEUTROPHILS # BLD AUTO: 4.5 10E3/UL (ref 1.6–8.3)
NEUTROPHILS NFR BLD AUTO: 68 %
NRBC # BLD AUTO: 0 10E3/UL
NRBC BLD AUTO-RTO: 0 /100
PLATELET # BLD AUTO: 297 10E3/UL (ref 150–450)
POTASSIUM SERPL-SCNC: 4.4 MMOL/L (ref 3.4–5.3)
PROCALCITONIN SERPL IA-MCNC: 0.04 NG/ML
PROT SERPL-MCNC: 7.8 G/DL (ref 6.4–8.3)
RBC # BLD AUTO: 3.84 10E6/UL (ref 3.8–5.2)
RSV RNA SPEC NAA+PROBE: NEGATIVE
SARS-COV-2 RNA RESP QL NAA+PROBE: NEGATIVE
SODIUM SERPL-SCNC: 136 MMOL/L (ref 135–145)
WBC # BLD AUTO: 6.5 10E3/UL (ref 4–11)

## 2023-10-12 PROCEDURE — 93005 ELECTROCARDIOGRAM TRACING: CPT | Performed by: FAMILY MEDICINE

## 2023-10-12 PROCEDURE — 36415 COLL VENOUS BLD VENIPUNCTURE: CPT | Performed by: FAMILY MEDICINE

## 2023-10-12 PROCEDURE — 250N000011 HC RX IP 250 OP 636: Mod: JZ | Performed by: FAMILY MEDICINE

## 2023-10-12 PROCEDURE — 93010 ELECTROCARDIOGRAM REPORT: CPT | Performed by: INTERNAL MEDICINE

## 2023-10-12 PROCEDURE — 99284 EMERGENCY DEPT VISIT MOD MDM: CPT | Performed by: FAMILY MEDICINE

## 2023-10-12 PROCEDURE — 85025 COMPLETE CBC W/AUTO DIFF WBC: CPT | Performed by: FAMILY MEDICINE

## 2023-10-12 PROCEDURE — 80053 COMPREHEN METABOLIC PANEL: CPT | Performed by: FAMILY MEDICINE

## 2023-10-12 PROCEDURE — 71045 X-RAY EXAM CHEST 1 VIEW: CPT | Mod: TC

## 2023-10-12 PROCEDURE — 84145 PROCALCITONIN (PCT): CPT | Performed by: FAMILY MEDICINE

## 2023-10-12 PROCEDURE — 96374 THER/PROPH/DIAG INJ IV PUSH: CPT | Performed by: FAMILY MEDICINE

## 2023-10-12 PROCEDURE — 99285 EMERGENCY DEPT VISIT HI MDM: CPT | Mod: 25 | Performed by: FAMILY MEDICINE

## 2023-10-12 PROCEDURE — 86140 C-REACTIVE PROTEIN: CPT | Performed by: FAMILY MEDICINE

## 2023-10-12 PROCEDURE — 83605 ASSAY OF LACTIC ACID: CPT | Performed by: FAMILY MEDICINE

## 2023-10-12 PROCEDURE — 87637 SARSCOV2&INF A&B&RSV AMP PRB: CPT | Performed by: FAMILY MEDICINE

## 2023-10-12 PROCEDURE — C9803 HOPD COVID-19 SPEC COLLECT: HCPCS | Performed by: FAMILY MEDICINE

## 2023-10-12 PROCEDURE — 85652 RBC SED RATE AUTOMATED: CPT | Performed by: FAMILY MEDICINE

## 2023-10-12 RX ORDER — PREDNISONE 20 MG/1
TABLET ORAL
Qty: 9 TABLET | Refills: 0 | Status: SHIPPED | OUTPATIENT
Start: 2023-10-12

## 2023-10-12 RX ORDER — METHYLPREDNISOLONE SODIUM SUCCINATE 125 MG/2ML
125 INJECTION, POWDER, LYOPHILIZED, FOR SOLUTION INTRAMUSCULAR; INTRAVENOUS ONCE
Status: COMPLETED | OUTPATIENT
Start: 2023-10-12 | End: 2023-10-12

## 2023-10-12 RX ADMIN — METHYLPREDNISOLONE SODIUM SUCCINATE 125 MG: 125 INJECTION, POWDER, FOR SOLUTION INTRAMUSCULAR; INTRAVENOUS at 20:56

## 2023-10-12 ASSESSMENT — ACTIVITIES OF DAILY LIVING (ADL)
ADLS_ACUITY_SCORE: 35
ADLS_ACUITY_SCORE: 33

## 2023-10-12 NOTE — ED TRIAGE NOTES
Pt states her O2 was 83-84% on RA.  Pt states she uses a CPAP at night and PRN during the day. In triage the pt's O2 was 92% which the pt reports is normal for her.  Pt also states her BP was high at home in the 150's systolic.

## 2023-10-13 NOTE — ED PROVIDER NOTES
History     Chief Complaint   Patient presents with    Hypoxia     HPI  Alicia Becker is a 67 year old female who presents with shortness of breath.  Of significance her 's been sick for about the last 1 week.  She has had similar symptoms.  They include a dry cough which was also productive of yellowish sputum last week.  She has not had a fever.  She tested for COVID at home and was negative.  No nausea or vomiting.  Eating and drinking well.  She has chronic lung disease at her baseline and vasculitis for which she takes prednisone 7 mg daily.  She has had increasing shortness of breath and difficulty taking a deep breath.  No leg swelling or leg pain.    Allergies:  Allergies   Allergen Reactions    Ciprofloxacin      Other reaction(s): Seizures    Levofloxacin      Other reaction(s): Seizures    Quinolones      Other reaction(s): Seizures    Nsaids Other (See Comments)    Sulfa Antibiotics      Other reaction(s): *Unknown    Carbamazepine      Other reaction(s): Other - Describe In Comment Field  Ineffective for seizure control.    Levetiracetam      Other reaction(s): Other - Describe In Comment Field  Ineffective at 750 mg BID for seizure control.     Lisinopril      Other reaction(s): Yeast Infection    Lorazepam Other (See Comments)     Needed so much to stop seizures that she couldn't function.        Niacin      Other reaction(s): *Unknown  Persistant flushing    Valproic Acid      Other reaction(s): Other - Describe In Comment Field  Ineffective for seizure control.        Problem List:    Patient Active Problem List    Diagnosis Date Noted    Age-related osteoporosis without current pathological fracture 02/17/2023     Priority: Medium    Acute appendicitis with localized peritonitis 02/16/2023     Priority: Medium    Low serum cortisol level 02/24/2022     Priority: Medium    Pulmonary hypertension (H) 01/24/2019     Priority: Medium    Community acquired bacterial pneumonia 01/23/2019      Priority: Medium    Community acquired pneumonia, unspecified laterality 01/23/2019     Priority: Medium    Abnormal endocrine laboratory test finding 12/04/2018     Priority: Medium    Acquired hammer toe of left foot 10/15/2018     Priority: Medium    ANCA-positive vasculitis (H) 03/06/2017     Priority: Medium    Closed fracture of distal end of left radius with routine healing 12/27/2016     Priority: Medium    Cerebral hyponatremia 12/25/2016     Priority: Medium    Immunocompromised patient (H24) 12/25/2016     Priority: Medium    Long term current use of systemic steroids 12/25/2016     Priority: Medium    Seizure disorder (H) 12/25/2016     Priority: Medium    Weakness of left side of body 12/23/2016     Priority: Medium    Closed Colles' fracture of left radius 12/21/2016     Priority: Medium    Community acquired pneumonia 06/07/2016     Priority: Medium    Fibrosis of lung (H) 06/07/2016     Priority: Medium    Hypoxia 06/07/2016     Priority: Medium    Ischemic stroke (H) 02/11/2016     Priority: Medium    Polyneuropathy in diseases classified elsewhere (H24) 02/11/2016     Priority: Medium    Abnormal chest sounds 01/29/2015     Priority: Medium     Overview:   Overview:   Rhonchi heard on exam when patient is asymptomatic and has normal CXR.      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   BP at goal.   On losartan.   On ASA.   microalbumin 2015  A1C 6.9 2015  Eye Exam 10/2014: no retinopathy.  She does have cataracts       DM (diabetes mellitus) type II controlled, neurological manifestation (H) 07/24/2012     Priority: Medium     Formatting of this note might be different from the original. Dx 1996 LDL at goal. BP at goal. On losartan. On ASA. microalbumin 2015 A1C 6.9 2015 Eye Exam 10/2014: no retinopathy.  She does have cataracts      Necrotizing vasculitis (H24) 05/23/2012     Priority: Medium     Overview:   Overview:   5/2012: Bx L and R foot: necrotizing vasculitis / MPO +; , ESPERANZA : negative  CTX June 2012- 11/2012, AZA 12/2012-1/2013; [facility] eval Brain Bx; MTX 5/2016  Monthly CBC, AST, Cr; periodic UA  5/28/2015 Bx Nodular necrotizing vasculitis / Panniculitis, resembling EN      Disease of lung 05/01/2012     Priority: Medium     Overview:   Overview:   Needs CT without contrast Dec. 2015 - follow up pulm nodules - if stable no further chest images necessary.      Generalized seizure (H) 03/13/2012     Priority: Medium     Overview:   Overview:   Needs neurology follow up in March - July 2015 with new neurologist.      Essential (primary) hypertension 05/30/2008     Priority: Medium     Overview:   Overview:   IMO Update      Hypercholesterolemia 05/30/2008     Priority: Medium    Sleep apnea 05/30/2008     Priority: Medium        Past Medical History:    No past medical history on file.    Past Surgical History:    Past Surgical History:   Procedure Laterality Date    AS FLEX SIGMOIDOSCOPY W BIOPSY  01/24/2014    [facility], results not in chart    COLONOSCOPY  02/03/2014    10 year fu 2/3/24    LAPAROSCOPIC APPENDECTOMY N/A 2/16/2023    Procedure: APPENDECTOMY, LAPAROSCOPIC;  Surgeon: Reza Emanuel MD;  Location:  OR       Family History:    No family history on file.    Social History:  Marital Status:   [2]  Social History     Tobacco Use    Smoking status: Former     Passive exposure: Past    Smokeless tobacco: Never   Vaping Use    Vaping Use: Never used   Substance Use Topics    Alcohol use: No    Drug  "use: No     Comment: Drug use: No        Medications:    predniSONE (DELTASONE) 20 MG tablet  acetaminophen (TYLENOL) 325 MG tablet  acetaminophen (TYLENOL) 500 MG tablet  albuterol (PROAIR HFA/PROVENTIL HFA/VENTOLIN HFA) 108 (90 Base) MCG/ACT inhaler  alendronate (FOSAMAX) 70 MG tablet  amLODIPine (NORVASC) 5 MG tablet  ascorbic acid (VITAMIN C) 500 MG tablet  aspirin 81 MG chewable tablet  BD SAFETYGLIDE SYRINGE/NEEDLE 27G X 5/8\" 1 ML MISC  blood glucose (NO BRAND SPECIFIED) test strip  blood glucose monitoring ("[employer] MICROLET) lancets  calcium carbonate-vitamin D (OYSTER SHELL CALCIUM/D) 500-200 MG-UNIT tablet  clotrimazole (LOTRIMIN) 1 % external cream  cyanocobalamin (VITAMIN B-12) 1000 MCG tablet  folic acid (FOLVITE) 1 MG tablet  furosemide (LASIX) 20 MG tablet  glipiZIDE (GLUCOTROL) 5 MG tablet  glucagon 1 MG kit  ipratropium - albuterol 0.5 mg/2.5 mg/3 mL (DUONEB) 0.5-2.5 (3) MG/3ML neb solution  lacosamide (VIMPAT) 200 MG TABS tablet  losartan (COZAAR) 100 MG tablet  metFORMIN (GLUCOPHAGE XR) 500 MG 24 hr tablet  metFORMIN (GLUCOPHAGE XR) 500 MG 24 hr tablet  methimazole (TAPAZOLE) 5 MG tablet  methotrexate 250 MG/10ML SOLN injection  montelukast (SINGULAIR) 10 MG tablet  montelukast (SINGULAIR) 10 MG tablet  multivitamin w/minerals (THERA-VIT-M) tablet  mupirocin (BACTROBAN) 2 % external ointment  mycophenolate (GENERIC EQUIVALENT) 500 MG tablet  mycophenolate (GENERIC EQUIVALENT) 500 MG tablet  omega 3 1000 MG CAPS  OXcarbazepine (TRILEPTAL) 600 MG tablet  oxyCODONE (ROXICODONE) 5 MG tablet  potassium chloride SA (K-DUR/KLOR-CON M) 20 MEQ CR tablet  predniSONE (DELTASONE) 1 MG tablet  predniSONE (DELTASONE) 5 MG tablet  Saline (SODIUM CHLORIDE) 0.65 % SOLN  senna-docusate (SENOKOT-S/PERICOLACE) 8.6-50 MG tablet  simvastatin (ZOCOR) 20 MG tablet  sodium chloride 1 GM tablet  traMADol (ULTRAM) 50 MG tablet  triamcinolone (KENALOG) 0.1 % external ointment  Tuberculin-Allergy Syringes 25G X 5/8\" 1 ML " "MISC  vitamin E (TOCOPHEROL) 400 units (180 mg) capsule          Review of Systems    Physical Exam   BP: (!) 153/83  Pulse: 92  Temp: 98.6  F (37  C)  Resp: 18  Height: 160 cm (5' 3\")  Weight: 76.2 kg (168 lb)  SpO2: 92 %      Physical Exam    ED Course              ED Course as of 10/12/23 2118   Thu Oct 12, 2023   1934 Sick for about the last 1 week.  COVID-negative at home.   was also sick with similar symptoms.  Uses CPAP at night but not normally hypoxic or needing oxygen during the day.  Several comorbidities including underlying lung disease.     Procedures              EKG Interpretation:      Interpreted by Batsheva Zhang DO  Time reviewed: 830pm  Symptoms at time of EKG: none   Rhythm: normal sinus   Rate: normal  Axis: normal  Ectopy: none  Conduction: normal  ST Segments/ T Waves: No ST-T wave changes  Q Waves: none  Comparison to prior: Unchanged    Clinical Impression: normal EKG      Critical Care time:  none               Results for orders placed or performed during the hospital encounter of 10/12/23 (from the past 24 hour(s))   Symptomatic Influenza A/B, RSV, & SARS-CoV2 PCR (COVID-19) Nose    Specimen: Nose; Swab   Result Value Ref Range    Influenza A PCR Negative Negative    Influenza B PCR Negative Negative    RSV PCR Negative Negative    SARS CoV2 PCR Negative Negative    Narrative    Testing was performed using the Xpert Xpress CoV2/Flu/RSV Assay on the [employer] GeneXpert Instrument. This test should be ordered for the detection of SARS-CoV-2, influenza, and RSV viruses in individuals who meet clinical and/or epidemiological criteria. Test performance is unknown in asymptomatic patients. This test is for in vitro diagnostic use under the FDA EUA for laboratories certified under CLIA to perform high or moderate complexity testing. This test has not been FDA cleared or approved. A negative result does not rule out the presence of PCR inhibitors in the specimen or target RNA in " concentration below the limit of detection for the assay. If only one viral target is positive but coinfection with multiple targets is suspected, the sample should be re-tested with another FDA cleared, approved, or authorized test, if coinfection would change clinical management. This test was validated by the [facility]. These laboratories are certified under the Clinical Laboratory Improvement Amendments of 1988 (CLIA-88) as qualified to perform high complexity laboratory testing.   CBC with platelets differential    Narrative    The following orders were created for panel order CBC with platelets differential.  Procedure                               Abnormality         Status                     ---------                               -----------         ------                     CBC with platelets and d...[[id number]]                      Final result                 Please view results for these tests on the individual orders.   Comprehensive metabolic panel   Result Value Ref Range    Sodium 136 135 - 145 mmol/L    Potassium 4.4 3.4 - 5.3 mmol/L    Carbon Dioxide (CO2) 32 (H) 22 - 29 mmol/L    Anion Gap 9 7 - 15 mmol/L    Urea Nitrogen 9.7 8.0 - 23.0 mg/dL    Creatinine 0.47 (L) 0.51 - 0.95 mg/dL    GFR Estimate >90 >60 mL/min/1.73m2    Calcium 9.5 8.8 - 10.2 mg/dL    Chloride 95 (L) 98 - 107 mmol/L    Glucose 155 (H) 70 - 99 mg/dL    Alkaline Phosphatase 79 35 - 104 U/L    AST 19 0 - 45 U/L    ALT 16 0 - 50 U/L    Protein Total 7.8 6.4 - 8.3 g/dL    Albumin 4.1 3.5 - 5.2 g/dL    Bilirubin Total <0.2 <=1.2 mg/dL   Procalcitonin   Result Value Ref Range    Procalcitonin 0.04 <0.05 ng/mL   Lactic acid whole blood   Result Value Ref Range    Lactic Acid 2.0 0.7 - 2.0 mmol/L   CRP inflammation   Result Value Ref Range    CRP Inflammation 18.98 (H) <5.00 mg/L   Erythrocyte sedimentation rate auto   Result Value Ref Range    Erythrocyte Sedimentation Rate 45 (H) 0 - 30 mm/hr   CBC with  platelets and differential   Result Value Ref Range    WBC Count 6.5 4.0 - 11.0 10e3/uL    RBC Count 3.84 3.80 - 5.20 10e6/uL    Hemoglobin 12.4 11.7 - 15.7 g/dL    Hematocrit 38.0 35.0 - 47.0 %    MCV 99 78 - 100 fL    MCH 32.3 26.5 - 33.0 pg    MCHC 32.6 31.5 - 36.5 g/dL    RDW 14.6 10.0 - 15.0 %    Platelet Count 297 150 - 450 10e3/uL    % Neutrophils 68 %    % Lymphocytes 17 %    % Monocytes 10 %    Mids % (Monos, Eos, Basos)      % Eosinophils 4 %    % Basophils 1 %    % Immature Granulocytes 0 %    NRBCs per 100 WBC 0 <1 /100    Absolute Neutrophils 4.5 1.6 - 8.3 10e3/uL    Absolute Lymphocytes 1.1 0.8 - 5.3 10e3/uL    Absolute Monocytes 0.6 0.0 - 1.3 10e3/uL    Mids Abs (Monos, Eos, Basos)      Absolute Eosinophils 0.2 0.0 - 0.7 10e3/uL    Absolute Basophils 0.0 0.0 - 0.2 10e3/uL    Absolute Immature Granulocytes 0.0 <=0.4 10e3/uL    Absolute NRBCs 0.0 10e3/uL   Extra Tube    Narrative    The following orders were created for panel order Extra Tube.  Procedure                               Abnormality         Status                     ---------                               -----------         ------                     Extra Blue Top Tube[[id number]]                              Final result               Extra Red Top Tube[[id number]]                               Final result                 Please view results for these tests on the individual orders.   Extra Blue Top Tube   Result Value Ref Range    Hold Specimen JIC    Extra Red Top Tube   Result Value Ref Range    Hold Specimen JIC    XR Chest Port 1 View    Narrative    PROCEDURE INFORMATION:   Exam: XR Chest   Exam date and time: 10/12/2023 7:56 PM   Age: 67 years old   Clinical indication: Cough and wheezing; Additional info: Cough, wheeze     TECHNIQUE:   Imaging protocol: Radiologic exam of the chest.   Views: 1 view.     COMPARISON:   CT CHEST PULMONARY EMBOLISM W CONTRAST 7/18/2023 5:07 PM     FINDINGS:   Lungs: Bibasilar opacities partially  "silhouette the diaphragm are favored to   represent combination of atelectasis/pleural effusion/consolidation.   Pleural spaces: See \"Lungs\" finding.   Heart/Mediastinum: Unremarkable. No cardiomegaly.   Bones/joints: Unremarkable.       Impression    IMPRESSION:   Bibasilar opacities partially silhouette the diaphragm are favored to represent   combination of atelectasis/pleural effusion/consolidation.     THIS DOCUMENT HAS BEEN ELECTRONICALLY SIGNED BY BRADY MEJIA MD       Medications   methylPREDNISolone sodium succinate (solu-MEDROL) injection 125 mg (125 mg Intravenous $Given 10/12/23 2056)       Assessments & Plan (with Medical Decision Making)     I have reviewed the nursing notes.    I have reviewed the findings, diagnosis, plan and need for follow up with the patient.           Medical Decision Making  The patient's presentation was of moderate complexity (a chronic illness mild to moderate exacerbation, progression, or side effect of treatment).    The patient's evaluation involved:  ordering and/or review of 3+ test(s) in this encounter (see separate area of note for details)    The patient's management necessitated moderate risk (prescription drug management including medications given in the ED).        Discharge Medication List as of 10/12/2023  9:11 PM        START taking these medications    Details   !! predniSONE (DELTASONE) 20 MG tablet 40mg x3 days then 20mg x3 days then resume home dose of 7mg daily, Disp-9 tablet, R-0, E-Prescribe       !! - Potential duplicate medications found. Please discuss with provider.          Final diagnoses:   Hypoxia   Suspect viral illness.  Given her comorbidities including underlying lung disease we discussed options including admission.  She did not want to be admitted to the hospital.  She has oxygen at home to use if needed.  She will continue using her oxygen at home until her levels above 90%.  She is very well versed in knowing how to measure and monitor " her oxygen saturation.  She was given a dose of Solu-Medrol.  I have increased her prednisone for the next few days.  She is already on amoxicillin for a tooth infection which she will continue to take.  Advised to return if any worsening symptoms.    10/12/2023   [facility]       Batsheva Zhang DO  10/12/23 [id number]

## 2023-10-13 NOTE — DISCHARGE INSTRUCTIONS
I sent a prescription for you for prednisone.  Take 40 mg daily x3 days and then 20 mg daily x3 days and then go to your regular 7 mg daily.  Continue your antibiotic that they gave you for your tooth infection.  See your doctor in the next 1 to 2 weeks for follow-up or as needed.  Wear your oxygen at home at all times until your oxygen level stays above 90%.

## 2023-10-16 LAB
ATRIAL RATE - MUSE: 95 BPM
DIASTOLIC BLOOD PRESSURE - MUSE: NORMAL MMHG
INTERPRETATION ECG - MUSE: NORMAL
P AXIS - MUSE: 55 DEGREES
PR INTERVAL - MUSE: 180 MS
QRS DURATION - MUSE: 76 MS
QT - MUSE: 346 MS
QTC - MUSE: 434 MS
R AXIS - MUSE: 9 DEGREES
SYSTOLIC BLOOD PRESSURE - MUSE: NORMAL MMHG
T AXIS - MUSE: 64 DEGREES
VENTRICULAR RATE- MUSE: 95 BPM

## 2023-12-12 ENCOUNTER — TRANSFERRED RECORDS (OUTPATIENT)
Dept: MULTI SPECIALTY CLINIC | Facility: CLINIC | Age: 68
End: 2023-12-12
Payer: COMMERCIAL

## 2023-12-12 LAB — RETINOPATHY: NEGATIVE

## 2023-12-20 ENCOUNTER — TRANSFERRED RECORDS (OUTPATIENT)
Dept: MULTI SPECIALTY CLINIC | Facility: CLINIC | Age: 68
End: 2023-12-20
Payer: COMMERCIAL

## 2023-12-20 LAB
ALBUMIN (URINE) MG/SPEC: 2.8 MG/DL
CHOLESTEROL (EXTERNAL): 178 MG/DL (ref 0–200)
CREATININE (URINE): 25 MG/DL
HDLC SERPL-MCNC: 91 MG/DL
LDL CHOLESTEROL CALCULATED (EXTERNAL): 71 MG/DL (ref 0–100)
NON HDL CHOLESTEROL (EXTERNAL): 87 MG/DL (ref 0–130)
TRIGLYCERIDES (EXTERNAL): 79 MG/DL

## 2024-02-17 ENCOUNTER — HOSPITAL ENCOUNTER (EMERGENCY)
Facility: OTHER | Age: 69
Discharge: HOME | End: 2024-02-17
Attending: STUDENT IN AN ORGANIZED HEALTH CARE EDUCATION/TRAINING PROGRAM | Admitting: STUDENT IN AN ORGANIZED HEALTH CARE EDUCATION/TRAINING PROGRAM
Payer: MEDICARE

## 2024-02-17 ENCOUNTER — APPOINTMENT (OUTPATIENT)
Dept: GENERAL RADIOLOGY | Facility: OTHER | Age: 69
End: 2024-02-17
Attending: STUDENT IN AN ORGANIZED HEALTH CARE EDUCATION/TRAINING PROGRAM
Payer: MEDICARE

## 2024-02-17 VITALS
TEMPERATURE: 97.5 F | BODY MASS INDEX: 28.88 KG/M2 | HEIGHT: 63 IN | RESPIRATION RATE: 20 BRPM | DIASTOLIC BLOOD PRESSURE: 77 MMHG | OXYGEN SATURATION: 94 % | SYSTOLIC BLOOD PRESSURE: 147 MMHG | WEIGHT: 163 LBS | HEART RATE: 91 BPM

## 2024-02-17 DIAGNOSIS — S92.352A CLOSED FRACTURE OF BASE OF FIFTH METATARSAL BONE OF LEFT FOOT, INITIAL ENCOUNTER: ICD-10-CM

## 2024-02-17 PROCEDURE — 99283 EMERGENCY DEPT VISIT LOW MDM: CPT | Performed by: STUDENT IN AN ORGANIZED HEALTH CARE EDUCATION/TRAINING PROGRAM

## 2024-02-17 PROCEDURE — 73630 X-RAY EXAM OF FOOT: CPT | Mod: LT

## 2024-02-17 RX ORDER — OXYCODONE AND ACETAMINOPHEN 5; 325 MG/1; MG/1
1 TABLET ORAL EVERY 6 HOURS PRN
Qty: 6 TABLET | Refills: 0 | Status: SHIPPED | OUTPATIENT
Start: 2024-02-17

## 2024-02-17 ASSESSMENT — ACTIVITIES OF DAILY LIVING (ADL): ADLS_ACUITY_SCORE: 37

## 2024-02-17 NOTE — ED TRIAGE NOTES
Patient caught her fot on the end of her sock twisting her left foot and injured it yesterday around 1700.  Patient reports more pain when she is walking on it.    BP (!) 147/77   Pulse 91   Temp 97.5  F (36.4  C) (Temporal)   Resp 20   SpO2 94%        Triage Assessment (Adult)       Row Name 02/17/24 1340          Triage Assessment    Airway WDL WDL        Respiratory WDL    Respiratory WDL WDL        Skin Circulation/Temperature WDL    Skin Circulation/Temperature WDL WDL        Cardiac WDL    Cardiac WDL WDL        Peripheral/Neurovascular WDL    Peripheral Neurovascular WDL WDL        Cognitive/Neuro/Behavioral WDL    Cognitive/Neuro/Behavioral WDL WDL

## 2024-02-17 NOTE — DISCHARGE INSTRUCTIONS
You broke your 5th metatarsal bone. Wear walking boot and avoid activities that cause discomfort. Call orthopedic surgery to setup follow up appointment in approximately 1-2 weeks.    For moderate to severe pain, use Tylenol 650 mg every 6 hours.    A short course of percocet narcotic pain medication has been provided for severe break-through pain not controlled by tylenol. Use carefully as narcotic pain medication can be addictive and dangerous (including life-threatening) if not used as prescribed. Side effects can include sedation, nausea, or constipation. For constipation use stool softener. Don't mix with alcohol, sedating drugs, or drive while taking.    Please review attached instructions including reasons to return to the emergency department.

## 2024-02-17 NOTE — ED PROVIDER NOTES
History     Chief Complaint   Patient presents with    Foot Injury    Foot Pain       Alicia Becker is a 68 year old female who presents with left foot pain.  Yesterday she was turning around and thinks she caught her left sock causing her ankle to rotate and twist feeling sharp pain over her left fifth metatarsal where there is a lump now.  Very difficult to bear weight on this foot now.  No change in numbness or weakness.  She has chronic left foot drop status post stroke.  Using aspirin for pain as this is the only medication she can tolerate.  No other injuries reported.  Only really has pain with weight bearing.      Allergies   Allergen Reactions    Ciprofloxacin      Other reaction(s): Seizures    Levofloxacin      Other reaction(s): Seizures    Quinolones      Other reaction(s): Seizures    Nsaids Other (See Comments)    Sulfa Antibiotics      Other reaction(s): *Unknown    Carbamazepine      Other reaction(s): Other - Describe In Comment Field  Ineffective for seizure control.    Levetiracetam      Other reaction(s): Other - Describe In Comment Field  Ineffective at 750 mg BID for seizure control.     Lisinopril      Other reaction(s): Yeast Infection    Lorazepam Other (See Comments)     Needed so much to stop seizures that she couldn't function.        Niacin      Other reaction(s): *Unknown  Persistant flushing    Valproic Acid      Other reaction(s): Other - Describe In Comment Field  Ineffective for seizure control.        Patient Active Problem List    Diagnosis Date Noted    Age-related osteoporosis without current pathological fracture 02/17/2023     Priority: Medium    Acute appendicitis with localized peritonitis 02/16/2023     Priority: Medium    Low serum cortisol level 02/24/2022     Priority: Medium    Pulmonary hypertension (H) 01/24/2019     Priority: Medium    Community acquired bacterial pneumonia 01/23/2019     Priority: Medium    Community acquired pneumonia, unspecified laterality  01/23/2019     Priority: Medium    Abnormal endocrine laboratory test finding 12/04/2018     Priority: Medium    Acquired hammer toe of left foot 10/15/2018     Priority: Medium    ANCA-positive vasculitis (H) 03/06/2017     Priority: Medium    Closed fracture of distal end of left radius with routine healing 12/27/2016     Priority: Medium    Cerebral hyponatremia 12/25/2016     Priority: Medium    Immunocompromised patient (H24) 12/25/2016     Priority: Medium    Long term current use of systemic steroids 12/25/2016     Priority: Medium    Seizure disorder (H) 12/25/2016     Priority: Medium    Weakness of left side of body 12/23/2016     Priority: Medium    Closed Colles' fracture of left radius 12/21/2016     Priority: Medium    Community acquired pneumonia 06/07/2016     Priority: Medium    Fibrosis of lung (H) 06/07/2016     Priority: Medium    Hypoxia 06/07/2016     Priority: Medium    Ischemic stroke (H) 02/11/2016     Priority: Medium    Polyneuropathy in diseases classified elsewhere (H24) 02/11/2016     Priority: Medium    Abnormal chest sounds 01/29/2015     Priority: Medium     Overview:   Overview:   Rhonchi heard on exam when patient is asymptomatic and has normal CXR.      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   BP at goal.   On losartan.   On ASA.   microalbumin 2015  A1C 6.9 2015  Eye Exam 10/2014: no retinopathy.  She does have cataracts      DM (diabetes mellitus) type II controlled, neurological manifestation (H)  07/24/2012     Priority: Medium     Formatting of this note might be different from the original. Dx 1996 LDL at goal. BP at goal. On losartan. On ASA. microalbumin 2015 A1C 6.9 2015 Eye Exam 10/2014: no retinopathy.  She does have cataracts      Necrotizing vasculitis (H24) 05/23/2012     Priority: Medium     Overview:   Overview:   5/2012: Bx L and R foot: necrotizing vasculitis / MPO +; , ESPERANZA : negative  CTX June 2012- 11/2012, AZA 12/2012-1/2013; [facility] eval Brain Bx; MTX 5/2016  Monthly CBC, AST, Cr; periodic UA  5/28/2015 Bx Nodular necrotizing vasculitis / Panniculitis, resembling EN      Disease of lung 05/01/2012     Priority: Medium     Overview:   Overview:   Needs CT without contrast Dec. 2015 - follow up pulm nodules - if stable no further chest images necessary.      Generalized seizure (H) 03/13/2012     Priority: Medium     Overview:   Overview:   Needs neurology follow up in March - July 2015 with new neurologist.      Essential (primary) hypertension 05/30/2008     Priority: Medium     Overview:   Overview:   IMO Update      Hypercholesterolemia 05/30/2008     Priority: Medium    Sleep apnea 05/30/2008     Priority: Medium       No past medical history on file.    Past Surgical History:   Procedure Laterality Date    AS FLEX SIGMOIDOSCOPY W BIOPSY  01/24/2014    [facility], results not in chart    COLONOSCOPY  02/03/2014    10 year fu 2/3/24    LAPAROSCOPIC APPENDECTOMY N/A 2/16/2023    Procedure: APPENDECTOMY, LAPAROSCOPIC;  Surgeon: Reza Emanuel MD;  Location: [facility] OR       No family history on file.    Social History     Tobacco Use    Smoking status: Former     Passive exposure: Past    Smokeless tobacco: Never   Vaping Use    Vaping Use: Never used   Substance Use Topics    Alcohol use: No    Drug use: No     Comment: Drug use: No       Medications:    oxyCODONE-acetaminophen (PERCOCET) 5-325 MG tablet  acetaminophen (TYLENOL) 325 MG tablet  acetaminophen (TYLENOL) 500 MG tablet  albuterol  "(PROAIR HFA/PROVENTIL HFA/VENTOLIN HFA) 108 (90 Base) MCG/ACT inhaler  alendronate (FOSAMAX) 70 MG tablet  amLODIPine (NORVASC) 5 MG tablet  ascorbic acid (VITAMIN C) 500 MG tablet  aspirin 81 MG chewable tablet  BD SAFETYGLIDE SYRINGE/NEEDLE 27G X 5/8\" 1 ML MISC  blood glucose (NO BRAND SPECIFIED) test strip  blood glucose monitoring (GOOD MICROLET) lancets  calcium carbonate-vitamin D (OYSTER SHELL CALCIUM/D) 500-200 MG-UNIT tablet  clotrimazole (LOTRIMIN) 1 % external cream  cyanocobalamin (VITAMIN B-12) 1000 MCG tablet  folic acid (FOLVITE) 1 MG tablet  furosemide (LASIX) 20 MG tablet  glipiZIDE (GLUCOTROL) 5 MG tablet  glucagon 1 MG kit  ipratropium - albuterol 0.5 mg/2.5 mg/3 mL (DUONEB) 0.5-2.5 (3) MG/3ML neb solution  lacosamide (VIMPAT) 200 MG TABS tablet  losartan (COZAAR) 100 MG tablet  metFORMIN (GLUCOPHAGE XR) 500 MG 24 hr tablet  metFORMIN (GLUCOPHAGE XR) 500 MG 24 hr tablet  methimazole (TAPAZOLE) 5 MG tablet  methotrexate 250 MG/10ML SOLN injection  montelukast (SINGULAIR) 10 MG tablet  montelukast (SINGULAIR) 10 MG tablet  multivitamin w/minerals (THERA-VIT-M) tablet  mupirocin (BACTROBAN) 2 % external ointment  mycophenolate (GENERIC EQUIVALENT) 500 MG tablet  mycophenolate (GENERIC EQUIVALENT) 500 MG tablet  omega 3 1000 MG CAPS  OXcarbazepine (TRILEPTAL) 600 MG tablet  oxyCODONE (ROXICODONE) 5 MG tablet  potassium chloride SA (K-DUR/KLOR-CON M) 20 MEQ CR tablet  predniSONE (DELTASONE) 1 MG tablet  predniSONE (DELTASONE) 20 MG tablet  predniSONE (DELTASONE) 5 MG tablet  Saline (SODIUM CHLORIDE) 0.65 % SOLN  senna-docusate (SENOKOT-S/PERICOLACE) 8.6-50 MG tablet  simvastatin (ZOCOR) 20 MG tablet  sodium chloride 1 GM tablet  traMADol (ULTRAM) 50 MG tablet  triamcinolone (KENALOG) 0.1 % external ointment  Tuberculin-Allergy Syringes 25G X 5/8\" 1 ML MISC  vitamin E (TOCOPHEROL) 400 units (180 mg) capsule      Review of Systems: See HPI for pertinent negatives and positives. All other systems " "reviewed and found to be negative.    Physical Exam   BP (!) 147/77   Pulse 91   Temp 97.5  F (36.4  C) (Temporal)   Resp 20   Ht 1.6 m (5' 3\")   Wt 73.9 kg (163 lb)   SpO2 94%   BMI 28.87 kg/m       General: awake, comfortable  HEENT: atraumatic  Respiratory: normal effort  Cardiovascular: left foot warm and appears well perfused  Extremities: left 5th metatarsal tenderness and swelling. ROM and strength limited by pain and former CVA with chronic foot drop. Wiggles all left toes. Adjacent ankle, tibulua/fibula, knee, femur   Skin: warm, dry, no rashes, skin intact, no discoloration   Neuro: alert, sensation intact throughout left foot  Psych: appropriate mood and affect    ED Course      ED Course as of 02/17/24 1531   Sat Feb 17, 2024   1518 Dr Cotton ortho surgery states walking boot and can do minor weight bearing as tolerated. F/U with ortho in approximately 2 weeks.       Results for orders placed or performed during the hospital encounter of 02/17/24 (from the past 24 hour(s))   XR Foot Port Left 3 Views    Narrative    PROCEDURE:  XR FOOT PORT LEFT 3 VIEWS    HISTORY: 5th metatarsal pain s/p injury yesterday    COMPARISON: Radiograph 2/2/2021    TECHNIQUE:  XR FOOT PORT LEFT 3 VIEWS    FINDINGS:  Osteopenia. Acute nondisplaced fracture through the proximal base of  the fifth metatarsal. Moderate to advanced degenerative change in the  first MTP and first interphalangeal joints. Chronic fracture deformity  of the head of the first proximal phalanx. No suspicious osseous  lesion. A calcaneal plantar enthesopathy.    No foreign body or subcutaneous emphysema.      Impression    IMPRESSION:   Acute nondisplaced fracture through the base of the fifth metatarsal.    CRYSTAL VEGA MD         SYSTEM ID:  [id number]       Medications - No data to display    Assessments & Plan (with Medical Decision Making)     I have reviewed nursing notes.    68 year old female evaluated for left metatarsal pain after twist " and turning ankle yesterday. Exam with left 5th metatarsal base pain and swelling. CMS intact considering baseline foot drop. Adjacent joints unremarkable on exam. X-ray with 5th metatarsal base non-displaced fracture.  Walking boot placed.  Orthopedic referral ordered.  Patient declines any pain medication while here in the ED.  Prescribing a short course of Percocet. Discharged home with attached instructions on diagnosis provided including ED return precautions.     I have reviewed the findings, diagnosis, plan and need for any follow up with the patient.    Patient instructions:   You broke your 5th metatarsal bone. Wear walking boot and avoid activities that cause discomfort. Call orthopedic surgery to setup follow up appointment in approximately 1-2 weeks.    For moderate to severe pain, use Tylenol 650 mg every 6 hours.    A short course of percocet narcotic pain medication has been provided for severe break-through pain not controlled by tylenol. Use carefully as narcotic pain medication can be addictive and dangerous (including life-threatening) if not used as prescribed. Side effects can include sedation, nausea, or constipation. For constipation use stool softener. Don't mix with alcohol, sedating drugs, or drive while taking.    Please review attached instructions including reasons to return to the emergency department.     New Prescriptions    OXYCODONE-ACETAMINOPHEN (PERCOCET) 5-325 MG TABLET    Take 1 tablet by mouth every 6 hours as needed for severe pain       Final diagnoses:   Closed fracture of base of fifth metatarsal bone of left foot, initial encounter       2/17/2024   [facility] [place]       Gerson Christy MD  02/17/24 [id number]

## 2024-02-24 ENCOUNTER — HEALTH MAINTENANCE LETTER (OUTPATIENT)
Age: 69
End: 2024-02-24

## 2024-02-27 DIAGNOSIS — M79.672 LEFT FOOT PAIN: Primary | ICD-10-CM

## 2024-02-29 ENCOUNTER — OFFICE VISIT (OUTPATIENT)
Dept: ORTHOPEDICS | Facility: OTHER | Age: 69
End: 2024-02-29
Attending: STUDENT IN AN ORGANIZED HEALTH CARE EDUCATION/TRAINING PROGRAM
Payer: MEDICARE

## 2024-02-29 ENCOUNTER — HOSPITAL ENCOUNTER (OUTPATIENT)
Dept: GENERAL RADIOLOGY | Facility: OTHER | Age: 69
Discharge: HOME | End: 2024-02-29
Attending: PODIATRIST
Payer: MEDICARE

## 2024-02-29 VITALS — HEART RATE: 90 BPM | OXYGEN SATURATION: 93 %

## 2024-02-29 DIAGNOSIS — M79.672 LEFT FOOT PAIN: ICD-10-CM

## 2024-02-29 DIAGNOSIS — S92.352A CLOSED FRACTURE OF BASE OF FIFTH METATARSAL BONE OF LEFT FOOT, INITIAL ENCOUNTER: ICD-10-CM

## 2024-02-29 PROCEDURE — G0463 HOSPITAL OUTPT CLINIC VISIT: HCPCS

## 2024-02-29 PROCEDURE — 73630 X-RAY EXAM OF FOOT: CPT | Mod: LT

## 2024-02-29 PROCEDURE — 99213 OFFICE O/P EST LOW 20 MIN: CPT | Performed by: PODIATRIST

## 2024-02-29 NOTE — PROGRESS NOTES
SUBJECTIVE:  Alicia is a new patient to see me.  She is a 68-year-old here to see me with a left fifth metatarsal base fracture.  She has a dropfoot which she uses a brace for secondary to previous CVA'S.  She states she tripped over her feet she did not fall but did develop 1/5 metatarsal base fracture which when evaluated was nondisplaced this happened on February 16 so she is now couple weeks into the injury.  She comes in today with a boot.  She is here to discuss options.    ROS: Musculoskeletal and general review of systems are negative, per review of previous clinic questionnaire.  Denies SOB and calf pain.    EXAM:   PHYSICAL EXAMINATION:   CONSTITUTIONAL:  The patient is alert and oriented x 3, well appearing and in no apparent distress.  Affect is pleasant and answers questions appropriately.  VASCULAR:  Circulation is intact with palpable pedal pulses and adequate capillary fill time to all digits.  Hair growth is present and appropriate to mid foot and digits. Calf nontender.  NEUROLOGIC:  Light touch sensation is intact to digits.  There is a negative Tinel sign.    INTEGUMENT:  No abnormal dermatologic lesions are noted.  Skin has normal texture and turgor.    MUSCULOSKELETAL: Left foot evaluated minimal symptoms to palpate.  She is walking in a boot with walker assist.    IMAGING: New x-rays taken today demonstrate nondisplaced fifth metatarsal base fracture.  Stable in comparison to previous x-rays    ASSESSMENT: Zone 1/5 metatarsal base fracture with known dropfoot she manages in an AFO    PLAN OF CARE: Discussed condition and treatment patient today.  We discussed nature of this injury and treatment of the zone 1 injury.  Generally prophylactic she can get into a shoe and in her brace as tolerated.  This should provide enough support and protection as it is anyhow.  With any symptoms down the road she can reappoint but as long as she is asymptomatic she can progress as tolerated with appropriate  shoe gear and her AFO.    Rashid Calderon, MELANIE

## 2024-09-21 ENCOUNTER — HEALTH MAINTENANCE LETTER (OUTPATIENT)
Age: 69
End: 2024-09-21

## 2024-11-21 LAB
CHOLESTEROL (EXTERNAL): 159 MG/DL (ref 0–200)
HDLC SERPL-MCNC: 88 MG/DL
LDL CHOLESTEROL CALCULATED (EXTERNAL): 60 MG/DL (ref 0–100)
NON HDL CHOLESTEROL (EXTERNAL): 71 MG/DL (ref 0–130)
TRIGLYCERIDES (EXTERNAL): 54 MG/DL

## 2025-02-09 ENCOUNTER — HEALTH MAINTENANCE LETTER (OUTPATIENT)
Age: 70
End: 2025-02-09

## 2025-04-05 ENCOUNTER — HEALTH MAINTENANCE LETTER (OUTPATIENT)
Age: 70
End: 2025-04-05

## 2025-05-20 ENCOUNTER — TRANSFERRED RECORDS (OUTPATIENT)
Dept: MULTI SPECIALTY CLINIC | Facility: CLINIC | Age: 70
End: 2025-05-20
Payer: COMMERCIAL

## 2025-05-20 LAB
ALBUMIN/CREATININE RATIO: 42 (ref 0–29)
CREATININE (URINE): 52 MG/DL
HBA1C MFR BLD: 7.1 % (ref 4–5.6)
MICROALBUMIN URINE (EXTERNAL): 2.2 MG/DL

## 2025-05-21 LAB
ANION GAP SERPL CALC-SCNC: 7 MEQ/L (ref 3–15)
BUN SERPL-MCNC: 13 MG/DL (ref 5–24)
CALCIUM (EXTERNAL): 9.5 MG/DL (ref 8.4–10.5)
CHLORIDE (EXTERNAL): 100 MEQ/L (ref 99–110)
CO2 (EXTERNAL): 30 MEQ/L (ref 19–29)
CREATININE (EXTERNAL): 0.73 MG/DL (ref 0.4–1)
GFR ESTIMATED (EXTERNAL): 89 ML/MIN/1.73M2
GFR ESTIMATED (IF AFRICAN AMERICAN) (EXTERNAL): ABNORMAL ML/MIN/1.73M2
GLUCOSE (EXTERNAL): 281 MG/DL (ref 70–99)
POTASSIUM (EXTERNAL): 4.3 MEQ/L (ref 3.4–5.1)
SODIUM (EXTERNAL): 137 MEQ/L (ref 134–143)

## 2025-05-29 ENCOUNTER — HOSPITAL ENCOUNTER (OUTPATIENT)
Dept: GENERAL RADIOLOGY | Facility: OTHER | Age: 70
End: 2025-05-29
Payer: MEDICARE

## 2025-05-29 ENCOUNTER — OFFICE VISIT (OUTPATIENT)
Dept: FAMILY MEDICINE | Facility: OTHER | Age: 70
End: 2025-05-29
Payer: MEDICARE

## 2025-05-29 VITALS
BODY MASS INDEX: 29.94 KG/M2 | TEMPERATURE: 97.4 F | WEIGHT: 169 LBS | SYSTOLIC BLOOD PRESSURE: 136 MMHG | RESPIRATION RATE: 23 BRPM | OXYGEN SATURATION: 93 % | DIASTOLIC BLOOD PRESSURE: 80 MMHG | HEART RATE: 80 BPM
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
## 2025-05-29 DIAGNOSIS — R07.81 RIB PAIN ON LEFT SIDE: ICD-10-CM

## 2025-05-29 DIAGNOSIS — S79.912A HIP INJURY, LEFT, INITIAL ENCOUNTER: Primary | ICD-10-CM

## 2025-05-29 PROCEDURE — 73502 X-RAY EXAM HIP UNI 2-3 VIEWS: CPT

## 2025-05-29 ASSESSMENT — PAIN SCALES - GENERAL: PAINLEVEL_OUTOF10: SEVERE PAIN (8)

## 2025-05-29 NOTE — PROGRESS NOTES
ASSESSMENT/PLAN:    I have reviewed the nursing notes.  I have reviewed the findings, diagnosis, plan and need for follow up with the patient.    1. Hip injury, left, initial encounter (Primary)  2. Rib pain on left side  3. Fall, initial encounter  - XR Ribs & Chest Lt 3v  - XR Hip Left 2-3 Views    Patient presents with an injury to her left hip and left anterior ribs after falling last night at home.  Left hip x-ray as well as her left rib and chest x-rays were negative for any fractures or dislocations.  No concerning abnormalities noted.  Discussed results with patient in clinic.  Advised patient to alternate between ice and heat and may take Tylenol as needed.    Discussed warning signs/symptoms indicative of need to f/u    Follow up if symptoms persist or worsen or concerns    I explained my diagnostic considerations and recommendations to the patient, who voiced understanding and agreement with the treatment plan. All questions were answered. We discussed potential side effects of any prescribed or recommended therapies, as well as expectations for response to treatments.    NAISH Davey CNP  5/29/2025  10:19 AM    HPI:    Alicia Becker is a 69 year old female who [facility] for concerns of lump on left rib and left hip injury    Patient states that she was standing behind her couch yesterday watching TV and must of dozed off.  She states she fell and landed on her left side.  She now has pain in her left anterior lower ribs and left hip.  She denies any discoloration of the skin on her anterior ribs and hip.  She states there is discomfort in her left hip with ambulation.  She uses a cane for ambulation due to chronic weakness in the left side of her body from a prior stroke.  She denies any shortness of breath or difficulty breathing.  She feels there may be a small lump on her anterior left lower ribs.  She has not taken anything for the pain.    History reviewed. No  "pertinent past medical history.  Past Surgical History:   Procedure Laterality Date    AS FLEX SIGMOIDOSCOPY W BIOPSY  01/24/2014    [facility], results not in chart    COLONOSCOPY  02/03/2014    10 year fu 2/3/24    LAPAROSCOPIC APPENDECTOMY N/A 2/16/2023    Procedure: APPENDECTOMY, LAPAROSCOPIC;  Surgeon: Reza Emanuel MD;  Location:  OR     Social History     Tobacco Use    Smoking status: Former     Passive exposure: Past    Smokeless tobacco: Never   Substance Use Topics    Alcohol use: No     Current Outpatient Medications   Medication Sig Dispense Refill    acetaminophen (TYLENOL) 325 MG tablet Take 2 tablets (650 mg) by mouth every 4 hours as needed for other (mild pain) 100 tablet 0    acetaminophen (TYLENOL) 500 MG tablet Take 2 tablets by mouth every night at bedtime, also may take 2 tablets by mouth every 6 hours as needed for pain. Max acetaminophen dose: 4000mg in 24 hrs.      albuterol (PROAIR HFA/PROVENTIL HFA/VENTOLIN HFA) 108 (90 Base) MCG/ACT inhaler Inhale 1-2 puffs into the lungs every 4 hours as needed for shortness of breath / dyspnea Shake before using.      alendronate (FOSAMAX) 70 MG tablet TAKE 1 TABLET BY MOUTH ONE TIME A WEEK. TAKE WITH PLAIN WATER IN THE MORNING      amLODIPine (NORVASC) 5 MG tablet Take 1 tablet by mouth daily      ascorbic acid (VITAMIN C) 500 MG tablet Take 500 mg by mouth daily       aspirin 81 MG chewable tablet Take 81 mg by mouth daily With a meal      BD SAFETYGLIDE SYRINGE/NEEDLE 27G X 5/8\" 1 ML MISC USE 1 EACH EVERY 7 DAYS. USE AS DIRECTED      blood glucose (NO BRAND SPECIFIED) test strip USE AS DIRECTED TESTING 3 TO 4 TIMES DAILY      blood glucose monitoring (GOOD MICROLET) lancets USE AS DIRECTED TESTING FOUR TIMES DAILY      calcium carbonate-vitamin D (OYSTER SHELL CALCIUM/D) 500-200 MG-UNIT tablet Take 1 tablet by mouth      clotrimazole (LOTRIMIN) 1 % external cream       cyanocobalamin (VITAMIN B-12) 1000 MCG tablet Take 1,000 mcg by mouth daily   "    folic acid (FOLVITE) 1 MG tablet Take 1 mg by mouth daily      furosemide (LASIX) 20 MG tablet Take 1 tablet by mouth in the morning.      glipiZIDE (GLUCOTROL) 5 MG tablet Take 2.5 mg by mouth daily      glucagon 1 MG kit Inject 1 mg into the muscle once As needed for low blood sugar      ipratropium - albuterol 0.5 mg/2.5 mg/3 mL (DUONEB) 0.5-2.5 (3) MG/3ML neb solution Inhale one neb by mouth once daily. When patient has a respiratory illness may increase to 2-3 times daily. 1 Box 0    lacosamide (VIMPAT) 200 MG TABS tablet Take 200 mg by mouth 2 times daily      losartan (COZAAR) 100 MG tablet Take 1 tablet by mouth daily      metFORMIN (GLUCOPHAGE XR) 500 MG 24 hr tablet Take 2 tablets by mouth 2 times daily (with meals)      metFORMIN (GLUCOPHAGE XR) 500 MG 24 hr tablet Take 4 tablets (2,000 mg) by mouth 2 times daily (with meals) Restart on Sunday 2/19      methimazole (TAPAZOLE) 5 MG tablet Take 5 mg by mouth daily      methotrexate 250 MG/10ML SOLN injection ADMINISTER 1 ML UNDER THE SKIN EVERY WEEK      montelukast (SINGULAIR) 10 MG tablet Take 1 tablet by mouth every evening      multivitamin w/minerals (THERA-VIT-M) tablet Take 1 tablet by mouth daily      mupirocin (BACTROBAN) 2 % external ointment APPLY SPARINGLY TOPICALLY IN EACH NOSTRIL TWICE DAILY FOR 1 MONTH      mycophenolate (GENERIC EQUIVALENT) 500 MG tablet Take 2 tablets by mouth 2 times daily      mycophenolate (GENERIC EQUIVALENT) 500 MG tablet Take 1,000 mg by mouth 2 times daily      omega 3 1000 MG CAPS Take 1 capsule by mouth 3 times daily (with meals)      OXcarbazepine (TRILEPTAL) 600 MG tablet Take 2 tablets by mouth 2 times daily      oxyCODONE (ROXICODONE) 5 MG tablet Take 1 tablet (5 mg) by mouth every 6 hours as needed for pain (Moderate to Severe) 12 tablet 0    oxyCODONE-acetaminophen (PERCOCET) 5-325 MG tablet Take 1 tablet by mouth every 6 hours as needed for severe pain 6 tablet 0    potassium chloride SA (K-DUR/KLOR-CON  "M) 20 MEQ CR tablet Take 1 tablet by mouth 2 times daily (with meals) Do not crush.      predniSONE (DELTASONE) 1 MG tablet Take 2 tablets by mouth once daily with 5 mg tablet to equate 7 mg x 5 days a week and take 3 tablets with 5 mg tablet to equate 8 mg twice weekly.      predniSONE (DELTASONE) 20 MG tablet 40mg x3 days then 20mg x3 days then resume home dose of 7mg daily 9 tablet 0    predniSONE (DELTASONE) 5 MG tablet Take with 1 mg tablets once daily to equate 7 mg daily x 5 days per week and 8 mg x 2 days per week.      Saline (SODIUM CHLORIDE) 0.65 % SOLN Spray 1 spray in nostril nightly as needed For nasal dryness      senna-docusate (SENOKOT-S/PERICOLACE) 8.6-50 MG tablet Take 1-2 tablets by mouth 2 times daily Take while on oral narcotics to prevent or treat constipation. 30 tablet 0    simvastatin (ZOCOR) 20 MG tablet Take 1 tablet by mouth At Bedtime      sodium chloride 1 GM tablet Take 4 tablets by mouth 3 times daily (with meals)      traMADol (ULTRAM) 50 MG tablet Take 1 tablet (50 mg) by mouth every 6 hours as needed for severe pain 15 tablet 0    triamcinolone (KENALOG) 0.1 % external ointment       Tuberculin-Allergy Syringes 25G X 5/8\" 1 ML MISC USE ONE NEW SYRINGE WITH EACH INJECTION EVERY 7 DAYS      vitamin E (TOCOPHEROL) 400 units (180 mg) capsule Take 400 Units by mouth daily       montelukast (SINGULAIR) 10 MG tablet Take 10 mg by mouth daily (Patient not taking: Reported on 5/29/2025)       Allergies   Allergen Reactions    Ciprofloxacin      Other reaction(s): Seizures    Levofloxacin      Other reaction(s): Seizures    Quinolones      Other reaction(s): Seizures    Nsaids Other (See Comments)    Sulfa Antibiotics      Other reaction(s): *Unknown    Carbamazepine      Other reaction(s): Other - Describe In Comment Field  Ineffective for seizure control.    Levetiracetam      Other reaction(s): Other - Describe In Comment Field  Ineffective at 750 mg BID for seizure control.     " Lisinopril      Other reaction(s): Yeast Infection    Lorazepam Other (See Comments)     Needed so much to stop seizures that she couldn't function.        Niacin      Other reaction(s): *Unknown  Persistant flushing    Valproic Acid      Other reaction(s): Other - Describe In Comment Field  Ineffective for seizure control.      Past medical history, past surgical history, current medications and allergies reviewed and accurate to the best of my knowledge.      ROS:  Refer to HPI    /80   Pulse 80   Temp 97.4  F (36.3  C) (Tympanic)   Resp 23   Wt 76.7 kg (169 lb)   SpO2 93%   BMI 29.94 kg/m      EXAM:  General Appearance: Well appearing 69 year old female, appropriate appearance for age. No acute distress   Respiratory: normal chest wall and respirations.  Normal effort.  Clear to auscultation bilaterally, no wheezing, crackles or rhonchi.  No increased work of breathing.  No cough appreciated.  Cardiac: RRR with no murmurs  Musculoskeletal:    PHYSICAL EXAMINATION:  General Hip Exam: no signs of deformity. Skin intact, no signs of skin changes and/or previous trauma to the hip. Leg length appears to be equal bilaterally .     Gait: Patient is able to rise from a seated position. Patient is able to bear weight. Patient uses cane for ambulating.     Palpation: tender to palpation over left greater trochanter    ROM: full, fluid and intact    Special Testing:   Straight Leg Raise: negative (Lumbar spine)   VINAY/Figure 4: positive  (TFL)    Neurovascular exam: distal pulses and sensation intact.   Mild tenderness with palpation of left anterior lower ribs, no discoloration noted, mild edema     Dermatological: no rashes noted of exposed skin  Neuro: Alert and oriented to person, place, and time.    Psychological: normal affect, alert, oriented, and pleasant.       Xray:  Results for orders placed or performed in visit on 05/29/25   XR Ribs & Chest Lt 3v     Status: None    Narrative    PROCEDURE:  XR RIBS  AND CHEST LEFT 3 VIEWS    HISTORY: pain of anterior left lower ribs; Rib pain on left side;  Fall, initial encounter    COMPARISON: Chest radiograph 10/12/2023; CT chest 7/18/2023; CT  abdomen pelvis 2/16/2023    FINDINGS: PA chest and three-view left rib radiographs    Cardiomediastinal silhouette is within normal limits.  No focal consolidation, effusion or pneumothorax.  Stable asymmetric  elevation of the right hemidiaphragm with overlying streaky  atelectasis versus scarring.    No suspicious osseous lesion. No acute osseous abnormality. Stable  chronic mild to moderate T10, T12-L2 compression deformities. Ribs  appear within normal limits.      Impression    IMPRESSION:    No acute cardiopulmonary process.    No acute osseous abnormality.    CRYSTAL VEGA MD         SYSTEM ID:  [id number]   XR Hip Left 2-3 Views     Status: None    Narrative    PROCEDURE:  XR HIP LEFT 2-3 VIEWS    HISTORY: Hip injury, left, initial encounter; Fall, initial encounter    COMPARISON: Radiographs 7/20/2022; MR pelvis 12/1/2021    TECHNIQUE: 2 view left hip radiographs    FINDINGS:    No acute fracture or dislocation is identified. No suspicious osseous  lesion. Mild left hip joint space narrowing. Mild bony overgrowth  along the superior acetabulum and at the femoral head neck junction.  Greater trochanter enthesopathy.    No foreign body or subcutaneous emphysema.       Impression    IMPRESSION:    Degenerative changes without acute osseous abnormality.    CRYSTAL VEGA MD         SYSTEM ID:  [id number]

## 2025-05-29 NOTE — NURSING NOTE
"Chief Complaint   Patient presents with    Rib Injury     Lump on left ribs     Patient here for lump on left rib after falling on left side yesterday.     Initial /80   Pulse 80   Temp 97.4  F (36.3  C) (Tympanic)   Resp 23   Wt 76.7 kg (169 lb)   SpO2 93%   BMI 29.94 kg/m   Estimated body mass index is 29.94 kg/m  as calculated from the following:    Height as of 2/17/24: 1.6 m (5' 3\").    Weight as of this encounter: 76.7 kg (169 lb).  Medication Review: complete    The next two questions are to help us understand your food security.  If you are feeling you need any assistance in this area, we have resources available to support you today.          5/29/2025   SDOH- Food Insecurity   Within the past 12 months, did you worry that your food would run out before you got money to buy more? N   Within the past 12 months, did the food you bought just not last and you didn t have money to get more? N         Health Care Directive:  Patient does not have a Health Care Directive: Discussed advance care planning with patient; however, patient declined at this time.    Violeta Steiner LPN      "

## 2025-06-02 ENCOUNTER — HOSPITAL ENCOUNTER (OUTPATIENT)
Dept: GENERAL RADIOLOGY | Facility: OTHER | Age: 70
Discharge: HOME | End: 2025-06-02
Payer: MEDICARE

## 2025-06-02 ENCOUNTER — OFFICE VISIT (OUTPATIENT)
Dept: FAMILY MEDICINE | Facility: OTHER | Age: 70
End: 2025-06-02
Payer: MEDICARE

## 2025-06-02 VITALS
DIASTOLIC BLOOD PRESSURE: 78 MMHG | OXYGEN SATURATION: 96 % | SYSTOLIC BLOOD PRESSURE: 132 MMHG | BODY MASS INDEX: 29.95 KG/M2 | RESPIRATION RATE: 20 BRPM | WEIGHT: 169 LBS | TEMPERATURE: 98.3 F | HEIGHT: 63 IN | HEART RATE: 85 BPM

## 2025-06-02 DIAGNOSIS — R07.81 RIB PAIN ON LEFT SIDE: Primary | ICD-10-CM

## 2025-06-02 DIAGNOSIS — M79.644 PAIN OF RIGHT THUMB: ICD-10-CM
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
## 2025-06-02 PROCEDURE — 3075F SYST BP GE 130 - 139MM HG: CPT

## 2025-06-02 PROCEDURE — 3078F DIAST BP <80 MM HG: CPT

## 2025-06-02 PROCEDURE — 73140 X-RAY EXAM OF FINGER(S): CPT | Mod: 26 | Performed by: RADIOLOGY

## 2025-06-02 PROCEDURE — 99213 OFFICE O/P EST LOW 20 MIN: CPT

## 2025-06-02 PROCEDURE — 71101 X-RAY EXAM UNILAT RIBS/CHEST: CPT | Mod: LT

## 2025-06-02 PROCEDURE — G0463 HOSPITAL OUTPT CLINIC VISIT: HCPCS

## 2025-06-02 PROCEDURE — 1125F AMNT PAIN NOTED PAIN PRSNT: CPT

## 2025-06-02 PROCEDURE — 73140 X-RAY EXAM OF FINGER(S): CPT | Mod: RT

## 2025-06-02 PROCEDURE — 71101 X-RAY EXAM UNILAT RIBS/CHEST: CPT | Mod: 26 | Performed by: RADIOLOGY

## 2025-06-02 RX ORDER — PREDNISONE 20 MG/1
20 TABLET ORAL DAILY
Qty: 3 TABLET | Refills: 0 | Status: SHIPPED | OUTPATIENT
Start: 2025-06-02 | End: 2025-06-05

## 2025-06-02 ASSESSMENT — PAIN SCALES - GENERAL: PAINLEVEL_OUTOF10: MODERATE PAIN (5)

## 2025-06-02 NOTE — PROGRESS NOTES
ASSESSMENT/PLAN:    I have reviewed the nursing notes.  I have reviewed the findings, diagnosis, plan and need for follow up with the patient.    1. Rib pain on left side (Primary)  2. Fall, subsequent encounter  - XR Ribs & Chest Lt 3v  - predniSONE (DELTASONE) 20 MG tablet; Take 1 tablet (20 mg) by mouth daily for 3 days.  Dispense: 3 tablet; Refill: 0    Patient presents for follow-up of her left-sided rib pain that started last week when she fell.  Left rib and chest x-ray was negative for any concerning abnormalities.  Discussed results with patient in clinic.  Discussed that she will be sore for the next 1 to 3 weeks.  I advised patient to continue taking Tylenol as needed.  Alternating between ice and heat.  Continue with deep breathing exercises.  Patient takes prednisone daily at a smaller dose.  Prescribed patient 20 mg of prednisone to take for the next 3 days to help with the inflammation in her chest and ribs.  After she has completed the 20 mg of prednisone advised her to resume her regular dose.    3. Pain of right thumb  - XR Finger Right G/E 2 Views  - predniSONE (DELTASONE) 20 MG tablet; Take 1 tablet (20 mg) by mouth daily for 3 days.  Dispense: 3 tablet; Refill: 0    Patient has also been experiencing right thumb pain after her fall.  Right thumb x-ray was negative for any fractures or dislocations.  It did indicate mild arthritis.  Discussed results with patient in clinic.  Continue with Tylenol as needed.    Discussed warning signs/symptoms indicative of need to f/u    Follow up if symptoms persist or worsen or concerns    I explained my diagnostic considerations and recommendations to the patient, who voiced understanding and agreement with the treatment plan. All questions were answered. We discussed potential side effects of any prescribed or recommended therapies, as well as expectations for response to treatments.    Kaushal Neely, ANISH CNP  6/2/2025  5:49 PM    HPI:    Alicia Becker is  a 69 year old female  who presents to [facility] today for concerns of rib and thumb pain    [facility] for left rib and hip pain after she fell on 5/28/25 while she was at home and landed on her left side.  Chest and rib x-ray at that time was negative for any abnormalities.  Hip x-ray was also negative.  Patient is no longer complaining of left hip pain but states that her left rib pain has worsened slightly and she states it is hard to take a deep breath.  Patient has been using Biofreeze and Tylenol with minimal relief of pain.  She states she has been trying to take deep breaths.  She denies any bruising, erythema, chest pain, cough, fever or chills.  Patient also states she is having pain in her right thumb since the fall.  She states her range of motion is intact but the pain is at her MCP joint.  She denies any discoloration or bony abnormalities.    History reviewed. No pertinent past medical history.  Past Surgical History:   Procedure Laterality Date    AS FLEX SIGMOIDOSCOPY W BIOPSY  01/24/2014    [facility], results not in chart    COLONOSCOPY  02/03/2014    10 year fu 2/3/24    LAPAROSCOPIC APPENDECTOMY N/A 2/16/2023    Procedure: APPENDECTOMY, LAPAROSCOPIC;  Surgeon: Reza Emanuel MD;  Location:  OR     Social History     Tobacco Use    Smoking status: Former     Passive exposure: Past    Smokeless tobacco: Never   Substance Use Topics    Alcohol use: No     Current Outpatient Medications   Medication Sig Dispense Refill    acetaminophen (TYLENOL) 325 MG tablet Take 2 tablets (650 mg) by mouth every 4 hours as needed for other (mild pain) 100 tablet 0    acetaminophen (TYLENOL) 500 MG tablet Take 2 tablets by mouth every night at bedtime, also may take 2 tablets by mouth every 6 hours as needed for pain. Max acetaminophen dose: 4000mg in 24 hrs.      albuterol (PROAIR HFA/PROVENTIL HFA/VENTOLIN HFA) 108 (90 Base) MCG/ACT inhaler Inhale 1-2 puffs into the  "lungs every 4 hours as needed for shortness of breath / dyspnea Shake before using.      alendronate (FOSAMAX) 70 MG tablet TAKE 1 TABLET BY MOUTH ONE TIME A WEEK. TAKE WITH PLAIN WATER IN THE MORNING      amLODIPine (NORVASC) 5 MG tablet Take 1 tablet by mouth daily      ascorbic acid (VITAMIN C) 500 MG tablet Take 500 mg by mouth daily       aspirin 81 MG chewable tablet Take 81 mg by mouth daily With a meal      BD SAFETYGLIDE SYRINGE/NEEDLE 27G X 5/8\" 1 ML MISC USE 1 EACH EVERY 7 DAYS. USE AS DIRECTED      blood glucose (NO BRAND SPECIFIED) test strip USE AS DIRECTED TESTING 3 TO 4 TIMES DAILY      blood glucose monitoring (GOOD MICROLET) lancets USE AS DIRECTED TESTING FOUR TIMES DAILY      calcium carbonate-vitamin D (OYSTER SHELL CALCIUM/D) 500-200 MG-UNIT tablet Take 1 tablet by mouth      clotrimazole (LOTRIMIN) 1 % external cream       cyanocobalamin (VITAMIN B-12) 1000 MCG tablet Take 1,000 mcg by mouth daily      folic acid (FOLVITE) 1 MG tablet Take 1 mg by mouth daily      furosemide (LASIX) 20 MG tablet Take 1 tablet by mouth in the morning.      glipiZIDE (GLUCOTROL) 5 MG tablet Take 2.5 mg by mouth daily      glucagon 1 MG kit Inject 1 mg into the muscle once As needed for low blood sugar      ipratropium - albuterol 0.5 mg/2.5 mg/3 mL (DUONEB) 0.5-2.5 (3) MG/3ML neb solution Inhale one neb by mouth once daily. When patient has a respiratory illness may increase to 2-3 times daily. 1 Box 0    lacosamide (VIMPAT) 200 MG TABS tablet Take 200 mg by mouth 2 times daily      losartan (COZAAR) 100 MG tablet Take 1 tablet by mouth daily      metFORMIN (GLUCOPHAGE XR) 500 MG 24 hr tablet Take 2 tablets by mouth 2 times daily (with meals)      metFORMIN (GLUCOPHAGE XR) 500 MG 24 hr tablet Take 4 tablets (2,000 mg) by mouth 2 times daily (with meals) Restart on Sunday 2/19      methimazole (TAPAZOLE) 5 MG tablet Take 5 mg by mouth daily      methotrexate 250 MG/10ML SOLN injection ADMINISTER 1 ML UNDER THE " SKIN EVERY WEEK      montelukast (SINGULAIR) 10 MG tablet Take 1 tablet by mouth every evening      multivitamin w/minerals (THERA-VIT-M) tablet Take 1 tablet by mouth daily      mupirocin (BACTROBAN) 2 % external ointment APPLY SPARINGLY TOPICALLY IN EACH NOSTRIL TWICE DAILY FOR 1 MONTH      mycophenolate (GENERIC EQUIVALENT) 500 MG tablet Take 2 tablets by mouth 2 times daily      mycophenolate (GENERIC EQUIVALENT) 500 MG tablet Take 1,000 mg by mouth 2 times daily      omega 3 1000 MG CAPS Take 1 capsule by mouth 3 times daily (with meals)      OXcarbazepine (TRILEPTAL) 600 MG tablet Take 2 tablets by mouth 2 times daily      oxyCODONE (ROXICODONE) 5 MG tablet Take 1 tablet (5 mg) by mouth every 6 hours as needed for pain (Moderate to Severe) 12 tablet 0    oxyCODONE-acetaminophen (PERCOCET) 5-325 MG tablet Take 1 tablet by mouth every 6 hours as needed for severe pain 6 tablet 0    potassium chloride SA (K-DUR/KLOR-CON M) 20 MEQ CR tablet Take 1 tablet by mouth 2 times daily (with meals) Do not crush.      predniSONE (DELTASONE) 1 MG tablet Take 2 tablets by mouth once daily with 5 mg tablet to equate 7 mg x 5 days a week and take 3 tablets with 5 mg tablet to equate 8 mg twice weekly.      predniSONE (DELTASONE) 20 MG tablet 40mg x3 days then 20mg x3 days then resume home dose of 7mg daily 9 tablet 0    predniSONE (DELTASONE) 5 MG tablet Take with 1 mg tablets once daily to equate 7 mg daily x 5 days per week and 8 mg x 2 days per week.      Saline (SODIUM CHLORIDE) 0.65 % SOLN Spray 1 spray in nostril nightly as needed For nasal dryness      senna-docusate (SENOKOT-S/PERICOLACE) 8.6-50 MG tablet Take 1-2 tablets by mouth 2 times daily Take while on oral narcotics to prevent or treat constipation. 30 tablet 0    simvastatin (ZOCOR) 20 MG tablet Take 1 tablet by mouth At Bedtime      sodium chloride 1 GM tablet Take 4 tablets by mouth 3 times daily (with meals)      traMADol (ULTRAM) 50 MG tablet Take 1 tablet  "(50 mg) by mouth every 6 hours as needed for severe pain 15 tablet 0    triamcinolone (KENALOG) 0.1 % external ointment       Tuberculin-Allergy Syringes 25G X 5/8\" 1 ML MIS USE ONE NEW SYRINGE WITH EACH INJECTION EVERY 7 DAYS      vitamin E (TOCOPHEROL) 400 units (180 mg) capsule Take 400 Units by mouth daily       montelukast (SINGULAIR) 10 MG tablet Take 10 mg by mouth daily (Patient not taking: Reported on 6/2/2025)       Allergies   Allergen Reactions    Ciprofloxacin      Other reaction(s): Seizures    Levofloxacin      Other reaction(s): Seizures    Quinolones      Other reaction(s): Seizures    Nsaids Other (See Comments)    Sulfa Antibiotics      Other reaction(s): *Unknown    Carbamazepine      Other reaction(s): Other - Describe In Comment Field  Ineffective for seizure control.    Levetiracetam      Other reaction(s): Other - Describe In Comment Field  Ineffective at 750 mg BID for seizure control.     Lisinopril      Other reaction(s): Yeast Infection    Lorazepam Other (See Comments)     Needed so much to stop seizures that she couldn't function.        Niacin      Other reaction(s): *Unknown  Persistant flushing    Valproic Acid      Other reaction(s): Other - Describe In Comment Field  Ineffective for seizure control.      Past medical history, past surgical history, current medications and allergies reviewed and accurate to the best of my knowledge.      ROS:  Refer to HPI    /78   Pulse 85   Temp 98.3  F (36.8  C) (Tympanic)   Resp 20   Ht 1.6 m (5' 3\")   Wt 76.7 kg (169 lb)   SpO2 96%   BMI 29.94 kg/m      EXAM:  General Appearance: Well appearing 69 year old female, appropriate appearance for age. No acute distress   Respiratory: normal chest wall and respirations.  Normal effort.  Clear to auscultation bilaterally, no wheezing, crackles or rhonchi.  No increased work of breathing.  No cough appreciated.  Cardiac: RRR with no murmurs  Musculoskeletal: Tenderness with palpation of " anterior left lower ribs.  No bony abnormality palpated, no erythema or ecchymosis noted, no edema noted.  Tenderness with palpation of MCP joint of right thumb.  No bony abnormalities noted.  Range of motion intact.  No skin discoloration noted.  Dermatological: no rashes noted of exposed skin  Neuro: Alert and oriented to person, place, and time.    Psychological: normal affect, alert, oriented, and pleasant.     Xray:  Results for orders placed or performed in visit on 06/02/25   XR Finger Right G/E 2 Views     Status: None    Narrative    EXAM: XR FINGER RIGHT G/E 2 VIEWS  LOCATION: [facility]  DATE: 6/2/2025    INDICATION: pain of MCP joint  COMPARISON: No direct comparison available.       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 No acute fracture.      XR Ribs & Chest Lt 3v     Status: None    Narrative    EXAM: XR RIBS AND CHEST LEFT 3 VIEWS  LOCATION: [facility]  DATE: 6/2/2025    INDICATION: anterior rib pain below left breast  COMPARISON: 5/29/2025       Impression    IMPRESSION:  Unchanged elevation of the right hemidiaphragm with patchy atelectasis and/or scarring of the right lung base. The cardiac silhouette is normal in size and configuration without pulmonary vascular congestion. No pleural effusion,   pneumothorax, or consolidation.  No acute or healing rib fractures.

## 2025-06-02 NOTE — NURSING NOTE
"Chief Complaint   Patient presents with    Rib Pain     Left sided    Thumb Discomfort     Right thumb     Patient here for left rib pain from visit on 5/29/25 that is worsening. Has treated with Biofreeze and tylenol. Reports pain with breathing. Also complains of right thumb pain x6 days.    Initial /78   Pulse 85   Temp 98.3  F (36.8  C) (Tympanic)   Resp 20   Ht 1.6 m (5' 3\")   Wt 76.7 kg (169 lb)   SpO2 96%   BMI 29.94 kg/m   Estimated body mass index is 29.94 kg/m  as calculated from the following:    Height as of this encounter: 1.6 m (5' 3\").    Weight as of this encounter: 76.7 kg (169 lb).  Medication Review: complete    The next two questions are to help us understand your food security.  If you are feeling you need any assistance in this area, we have resources available to support you today.          6/2/2025   SDOH- Food Insecurity   Within the past 12 months, did you worry that your food would run out before you got money to buy more? N   Within the past 12 months, did the food you bought just not last and you didn t have money to get more? N         Health Care Directive:  Patient does not have a Health Care Directive: Discussed advance care planning with patient; however, patient declined at this time.    Violeta Steiner LPN      "

## 2025-06-12 ENCOUNTER — TRANSFERRED RECORDS (OUTPATIENT)
Dept: FAMILY MEDICINE | Facility: OTHER | Age: 70
End: 2025-06-12
Payer: COMMERCIAL

## 2025-07-22 ENCOUNTER — HOSPITAL ENCOUNTER (OUTPATIENT)
Dept: NUCLEAR MEDICINE | Facility: OTHER | Age: 70
Setting detail: NUCLEAR MEDICINE
Discharge: HOME | End: 2025-07-22
Attending: INTERNAL MEDICINE
Payer: MEDICARE

## 2025-07-22 DIAGNOSIS — E05.90 SUBCLINICAL HYPERTHYROIDISM: ICD-10-CM

## 2025-07-22 PROCEDURE — 78014 THYROID IMAGING W/BLOOD FLOW: CPT

## 2025-07-22 RX ADMIN — Medication 230 MICROCURIE: at 13:00

## 2025-07-23 ENCOUNTER — HOSPITAL ENCOUNTER (OUTPATIENT)
Dept: NUCLEAR MEDICINE | Facility: OTHER | Age: 70
Setting detail: NUCLEAR MEDICINE
Discharge: HOME | End: 2025-07-23
Attending: INTERNAL MEDICINE
Payer: MEDICARE

## 2025-07-28 ENCOUNTER — HOSPITAL ENCOUNTER (OUTPATIENT)
Dept: GENERAL RADIOLOGY | Facility: OTHER | Age: 70
Discharge: HOME | End: 2025-07-28
Attending: NURSE PRACTITIONER
Payer: MEDICARE

## 2025-07-28 ENCOUNTER — OFFICE VISIT (OUTPATIENT)
Dept: FAMILY MEDICINE | Facility: OTHER | Age: 70
End: 2025-07-28
Payer: MEDICARE

## 2025-07-28 VITALS
WEIGHT: 171 LBS | BODY MASS INDEX: 30.29 KG/M2 | RESPIRATION RATE: 21 BRPM | OXYGEN SATURATION: 97 % | TEMPERATURE: 98.7 F | SYSTOLIC BLOOD PRESSURE: 132 MMHG | HEART RATE: 89 BPM | DIASTOLIC BLOOD PRESSURE: 78 MMHG
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
## 2025-07-28 DIAGNOSIS — M25.522 PAIN AND SWELLING OF ELBOW, LEFT: ICD-10-CM

## 2025-07-28 DIAGNOSIS — R29.6 FALLS FREQUENTLY: ICD-10-CM
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
## 2025-07-28 DIAGNOSIS — S50.02XA CONTUSION OF LEFT ELBOW, INITIAL ENCOUNTER: Primary | ICD-10-CM

## 2025-07-28 DIAGNOSIS — M25.422 PAIN AND SWELLING OF ELBOW, LEFT: ICD-10-CM

## 2025-07-28 PROCEDURE — 73080 X-RAY EXAM OF ELBOW: CPT | Mod: 26 | Performed by: STUDENT IN AN ORGANIZED HEALTH CARE EDUCATION/TRAINING PROGRAM

## 2025-07-28 PROCEDURE — 73080 X-RAY EXAM OF ELBOW: CPT | Mod: LT

## 2025-07-28 PROCEDURE — 3075F SYST BP GE 130 - 139MM HG: CPT | Performed by: NURSE PRACTITIONER

## 2025-07-28 PROCEDURE — 3078F DIAST BP <80 MM HG: CPT | Performed by: NURSE PRACTITIONER

## 2025-07-28 PROCEDURE — 99213 OFFICE O/P EST LOW 20 MIN: CPT | Performed by: NURSE PRACTITIONER

## 2025-07-28 PROCEDURE — G0463 HOSPITAL OUTPT CLINIC VISIT: HCPCS

## 2025-07-28 PROCEDURE — 1125F AMNT PAIN NOTED PAIN PRSNT: CPT | Performed by: NURSE PRACTITIONER

## 2025-07-28 ASSESSMENT — PAIN SCALES - GENERAL: PAINLEVEL_OUTOF10: SEVERE PAIN (8)

## 2025-07-28 NOTE — NURSING NOTE
"Chief Complaint   Patient presents with    Elbow Injury     Left elbow     Patient here for left elbow pain x2 days after falling. Has treated with ice and ibuprofen.      Initial /78   Pulse 89   Temp 98.7  F (37.1  C) (Tympanic)   Resp 21   Wt 77.6 kg (171 lb)   SpO2 97%   BMI 30.29 kg/m   Estimated body mass index is 30.29 kg/m  as calculated from the following:    Height as of 6/2/25: 1.6 m (5' 3\").    Weight as of this encounter: 77.6 kg (171 lb).  Medication Review: complete    The next two questions are to help us understand your food security.  If you are feeling you need any assistance in this area, we have resources available to support you today.          7/28/2025   SDOH- Food Insecurity   Within the past 12 months, did you worry that your food would run out before you got money to buy more? N   Within the past 12 months, did the food you bought just not last and you didn t have money to get more? N         Health Care Directive:  Patient does not have a Health Care Directive: Discussed advance care planning with patient; however, patient declined at this time.    Violeta Steiner LPN      "

## 2025-07-28 NOTE — PROGRESS NOTES
ASSESSMENT/PLAN:     I have reviewed the nursing notes.  I have reviewed the findings, diagnosis, plan and need for follow up with the patient.        1. Falls frequently  Patient with reported 9 falls at home this year.  She attributes her falls to not wearing her left lower extremity brace while showering, etc that she wears due to foot drop from previous CVA.    Patient states she does also lose her balance and has some poor coordination as a result of her previous CVA.  Discussed with patient strong recommendations for physical therapy but patient declines at this time.    2. Fall at home, initial encounter  3. Pain and swelling of elbow, left  - XR Elbow Left G/E 3 Views    4. Contusion of left elbow, initial encounter (Primary)  X-ray of left elbow completed and personally reviewed, question avulsion fracture versus calcification of vasculature or tendon, Radiologist over read:  No fracture or dislocation. No elbow joint effusion. Edema along the posterior aspect of the elbow which could reflect posttraumatic confluent edema/hematoma or olecranon bursitis.     ABD pad with ace wrap applied for comfort and protection - use PRN  Patient declines sling   May use over-the-counter Tylenol or ibuprofen PRN  Follow up if symptoms persist or worsen or concerns      I explained my diagnostic considerations and recommendations to the patient, who voiced understanding and agreement with the treatment plan. All questions were answered. We discussed potential side effects of any prescribed or recommended therapies, as well as expectations for response to treatments.    Nenita Field NP  [facility]      SUBJECTIVE:   Alicia Becker is a 69 year old female who presents to clinic today for the following health issues:  Fall, elbow pain    HPI  Patient fell 2 days ago while in the laundry room.  States she lost her balance and fell onto her left side on the floor.  She hit her left hip/buttock, left  elbow, left hand and the left side of her head.  She is having  left elbow pain with swelling and bruising.  No pain in her elbow with movement.  She states the pain is only with bumping it.  She denies numbness or tingling in her left upper extremity.  She denies any swelling or pain on her head.  She denies headaches, dizziness, lightheadedness, or vision change.  She states she does have a swollen lump on her hip buttock area but is not concerned about fracture as she is able to bear weight and ambulate without difficulty.      States she has fallen approximately 9 times this year.  States most of the times she falls in the bathroom or bedroom related to not having her left lower leg/foot brace due to drop foot from hx of stroke.  She states she has gotten creative at compensating.      History reviewed. No pertinent past medical history.  Past Surgical History:   Procedure Laterality Date    AS FLEX SIGMOIDOSCOPY W BIOPSY  01/24/2014    [facility], results not in chart    COLONOSCOPY  02/03/2014    10 year fu 2/3/24    LAPAROSCOPIC APPENDECTOMY N/A 2/16/2023    Procedure: APPENDECTOMY, LAPAROSCOPIC;  Surgeon: Reza Emanuel MD;  Location:  OR     Social History     Tobacco Use    Smoking status: Former     Passive exposure: Past    Smokeless tobacco: Never   Substance Use Topics    Alcohol use: No     Current Outpatient Medications   Medication Sig Dispense Refill    acetaminophen (TYLENOL) 325 MG tablet Take 2 tablets (650 mg) by mouth every 4 hours as needed for other (mild pain) 100 tablet 0    acetaminophen (TYLENOL) 500 MG tablet Take 2 tablets by mouth every night at bedtime, also may take 2 tablets by mouth every 6 hours as needed for pain. Max acetaminophen dose: 4000mg in 24 hrs.      albuterol (PROAIR HFA/PROVENTIL HFA/VENTOLIN HFA) 108 (90 Base) MCG/ACT inhaler Inhale 1-2 puffs into the lungs every 4 hours as needed for shortness of breath / dyspnea Shake before using.      alendronate (FOSAMAX) 70  "MG tablet TAKE 1 TABLET BY MOUTH ONE TIME A WEEK. TAKE WITH PLAIN WATER IN THE MORNING      amLODIPine (NORVASC) 5 MG tablet Take 1 tablet by mouth daily      ascorbic acid (VITAMIN C) 500 MG tablet Take 500 mg by mouth daily       aspirin 81 MG chewable tablet Take 81 mg by mouth daily With a meal      BD SAFETYGLIDE SYRINGE/NEEDLE 27G X 5/8\" 1 ML MISC USE 1 EACH EVERY 7 DAYS. USE AS DIRECTED      blood glucose (NO BRAND SPECIFIED) test strip USE AS DIRECTED TESTING 3 TO 4 TIMES DAILY      blood glucose monitoring (GOOD MICROLET) lancets USE AS DIRECTED TESTING FOUR TIMES DAILY      calcium carbonate-vitamin D (OYSTER SHELL CALCIUM/D) 500-200 MG-UNIT tablet Take 1 tablet by mouth      clotrimazole (LOTRIMIN) 1 % external cream       cyanocobalamin (VITAMIN B-12) 1000 MCG tablet Take 1,000 mcg by mouth daily      folic acid (FOLVITE) 1 MG tablet Take 1 mg by mouth daily      furosemide (LASIX) 20 MG tablet Take 1 tablet by mouth in the morning.      glipiZIDE (GLUCOTROL) 5 MG tablet Take 2.5 mg by mouth daily      glucagon 1 MG kit Inject 1 mg into the muscle once As needed for low blood sugar      ipratropium - albuterol 0.5 mg/2.5 mg/3 mL (DUONEB) 0.5-2.5 (3) MG/3ML neb solution Inhale one neb by mouth once daily. When patient has a respiratory illness may increase to 2-3 times daily. 1 Box 0    lacosamide (VIMPAT) 200 MG TABS tablet Take 200 mg by mouth 2 times daily      losartan (COZAAR) 100 MG tablet Take 1 tablet by mouth daily      metFORMIN (GLUCOPHAGE XR) 500 MG 24 hr tablet Take 2 tablets by mouth 2 times daily (with meals)      metFORMIN (GLUCOPHAGE XR) 500 MG 24 hr tablet Take 4 tablets (2,000 mg) by mouth 2 times daily (with meals) Restart on Sunday 2/19      methimazole (TAPAZOLE) 5 MG tablet Take 5 mg by mouth daily      methotrexate 250 MG/10ML SOLN injection ADMINISTER 1 ML UNDER THE SKIN EVERY WEEK      montelukast (SINGULAIR) 10 MG tablet Take 1 tablet by mouth every evening      montelukast " (SINGULAIR) 10 MG tablet Take 10 mg by mouth daily (Patient not taking: Reported on 7/28/2025)      multivitamin w/minerals (THERA-VIT-M) tablet Take 1 tablet by mouth daily      mupirocin (BACTROBAN) 2 % external ointment APPLY SPARINGLY TOPICALLY IN EACH NOSTRIL TWICE DAILY FOR 1 MONTH      mycophenolate (GENERIC EQUIVALENT) 500 MG tablet Take 2 tablets by mouth 2 times daily      mycophenolate (GENERIC EQUIVALENT) 500 MG tablet Take 1,000 mg by mouth 2 times daily      omega 3 1000 MG CAPS Take 1 capsule by mouth 3 times daily (with meals)      OXcarbazepine (TRILEPTAL) 600 MG tablet Take 2 tablets by mouth 2 times daily      oxyCODONE (ROXICODONE) 5 MG tablet Take 1 tablet (5 mg) by mouth every 6 hours as needed for pain (Moderate to Severe) 12 tablet 0    oxyCODONE-acetaminophen (PERCOCET) 5-325 MG tablet Take 1 tablet by mouth every 6 hours as needed for severe pain 6 tablet 0    potassium chloride SA (K-DUR/KLOR-CON M) 20 MEQ CR tablet Take 1 tablet by mouth 2 times daily (with meals) Do not crush.      predniSONE (DELTASONE) 1 MG tablet Take 2 tablets by mouth once daily with 5 mg tablet to equate 7 mg x 5 days a week and take 3 tablets with 5 mg tablet to equate 8 mg twice weekly.      predniSONE (DELTASONE) 20 MG tablet 40mg x3 days then 20mg x3 days then resume home dose of 7mg daily 9 tablet 0    predniSONE (DELTASONE) 5 MG tablet Take with 1 mg tablets once daily to equate 7 mg daily x 5 days per week and 8 mg x 2 days per week.      Saline (SODIUM CHLORIDE) 0.65 % SOLN Spray 1 spray in nostril nightly as needed For nasal dryness      senna-docusate (SENOKOT-S/PERICOLACE) 8.6-50 MG tablet Take 1-2 tablets by mouth 2 times daily Take while on oral narcotics to prevent or treat constipation. 30 tablet 0    simvastatin (ZOCOR) 20 MG tablet Take 1 tablet by mouth At Bedtime      sodium chloride 1 GM tablet Take 4 tablets by mouth 3 times daily (with meals)      traMADol (ULTRAM) 50 MG tablet Take 1 tablet  "(50 mg) by mouth every 6 hours as needed for severe pain 15 tablet 0    triamcinolone (KENALOG) 0.1 % external ointment       Tuberculin-Allergy Syringes 25G X 5/8\" 1 ML MISC USE ONE NEW SYRINGE WITH EACH INJECTION EVERY 7 DAYS      vitamin E (TOCOPHEROL) 400 units (180 mg) capsule Take 400 Units by mouth daily        Allergies   Allergen Reactions    Ciprofloxacin      Other reaction(s): Seizures    Levofloxacin      Other reaction(s): Seizures    Quinolones      Other reaction(s): Seizures    Nsaids Other (See Comments)    Sulfa Antibiotics      Other reaction(s): *Unknown    Carbamazepine      Other reaction(s): Other - Describe In Comment Field  Ineffective for seizure control.    Levetiracetam      Other reaction(s): Other - Describe In Comment Field  Ineffective at 750 mg BID for seizure control.     Lisinopril      Other reaction(s): Yeast Infection    Lorazepam Other (See Comments)     Needed so much to stop seizures that she couldn't function.        Niacin      Other reaction(s): *Unknown  Persistant flushing    Valproic Acid      Other reaction(s): Other - Describe In Comment Field  Ineffective for seizure control.          Past medical history, past surgical history, current medications and allergies reviewed and accurate to the best of my knowledge.        OBJECTIVE:     /78   Pulse 89   Temp 98.7  F (37.1  C) (Tympanic)   Resp 21   Wt 77.6 kg (171 lb)   SpO2 97%   BMI 30.29 kg/m    Body mass index is 30.29 kg/m .      Physical Exam  General Appearance: Well appearing adult female, appropriate appearance for age. No acute distress  Respiratory: normal chest wall and respirations.  Normal effort. No increased work of breathing.  No cough appreciated.  Cardiac:  CMS intact to left upper extremity with brisk capillary refill and strong radial pulse   Musculoskeletal:  Left elbow with swelling over the olecranon with slight tenderness to palpation.  Normal ROM of left elbow with flexion and " extension without difficulty.  Left wrist without swelling or tenderness.  Normal ROM of left wrist.   Dermatological: skin intact to left elbow with bruising over the olecranon, no erythema or warmth.    Psychological: normal affect, alert, oriented, and pleasant.       Imaging:  Results for orders placed or performed in visit on 07/28/25   XR Elbow Left G/E 3 Views     Status: None    Narrative    EXAM: XR ELBOW LEFT G/E 3 VIEWS  LOCATION: [facility]  DATE: 7/28/2025    INDICATION:  Fall at home, initial encounter, Fall at home, initial encounter, Pain and swelling of elbow, left, Pain and swelling of elbow, left  COMPARISON: 07/19/2022      Impression    IMPRESSION: No fracture or dislocation. No elbow joint effusion. Edema along the posterior aspect of the elbow which could reflect posttraumatic confluent edema/hematoma or olecranon bursitis.

## (undated) DEVICE — GLOVE BIOGEL INDICATOR 7.5 LF 41675

## (undated) DEVICE — ENDO POUCH UNIV RETRIEVAL SYSTEM INZII 10MM CD001

## (undated) DEVICE — TUBING INSUFFLATOR W/FILTER OLYMPUS WA95005A

## (undated) DEVICE — STPL RELOAD REG TISSUE ECHELON 45 X 3.6MM BLUE GST45B

## (undated) DEVICE — GLOVE PROTEXIS POWDER FREE SMT 7.5  2D72PT75X

## (undated) DEVICE — COVER LIGHT HANDLE LT-F02

## (undated) DEVICE — Device

## (undated) DEVICE — STPL POWERED ECHELON 45MM PSEE45A

## (undated) DEVICE — ESU GROUND PAD ADULT W/CORD E7507

## (undated) DEVICE — CATH TRAY FOLEY SURESTEP 16FR W/URINE MTR STATLK LF A303416A

## (undated) DEVICE — SOL WATER 1500ML

## (undated) DEVICE — SU MONOCRYL 4-0 PS-2 27" UND Y426H

## (undated) DEVICE — SYR 10ML LL W/O NDL 302995

## (undated) DEVICE — VERRES NEEDLE 120MM DISPOSABLE 12/BX

## (undated) DEVICE — ENDO TROCAR FIRST ENTRY KII FIOS Z-THRD 12X100MM CTF73

## (undated) DEVICE — PACK LAPAROSCOPY LF SBA15LPFCA

## (undated) DEVICE — SU VICRYL 0 UR-6 27" J603H

## (undated) DEVICE — PREP CHLORAPREP 26ML TINTED ORANGE  260815

## (undated) DEVICE — ENDO SCOPE WARMER DUAL LAP TM500D

## (undated) DEVICE — ENDO TROCAR SLEEVE KII 5X100MM CTR03

## (undated) DEVICE — ENDO TROCAR SLEEVE KII Z-THREADED 05X100MM CTS02

## (undated) RX ORDER — METHYLPREDNISOLONE SODIUM SUCCINATE 125 MG/2ML
INJECTION, POWDER, LYOPHILIZED, FOR SOLUTION INTRAMUSCULAR; INTRAVENOUS
Status: DISPENSED
Start: 2023-10-12

## (undated) RX ORDER — ACETAMINOPHEN 500 MG
TABLET ORAL
Status: DISPENSED
Start: 2018-03-05

## (undated) RX ORDER — FENTANYL CITRATE 50 UG/ML
INJECTION, SOLUTION INTRAMUSCULAR; INTRAVENOUS
Status: DISPENSED
Start: 2023-02-16

## (undated) RX ORDER — PROPOFOL 10 MG/ML
INJECTION, EMULSION INTRAVENOUS
Status: DISPENSED
Start: 2023-02-16

## (undated) RX ORDER — GLYCOPYRROLATE 0.2 MG/ML
INJECTION, SOLUTION INTRAMUSCULAR; INTRAVENOUS
Status: DISPENSED
Start: 2023-02-16

## (undated) RX ORDER — OXCARBAZEPINE 300 MG/1
TABLET, FILM COATED ORAL
Status: DISPENSED
Start: 2023-02-16

## (undated) RX ORDER — VANCOMYCIN HYDROCHLORIDE 1 G/20ML
INJECTION, POWDER, LYOPHILIZED, FOR SOLUTION INTRAVENOUS
Status: DISPENSED
Start: 2019-01-23

## (undated) RX ORDER — CLINDAMYCIN HCL 150 MG
CAPSULE ORAL
Status: DISPENSED
Start: 2021-07-09

## (undated) RX ORDER — ONDANSETRON 2 MG/ML
INJECTION INTRAMUSCULAR; INTRAVENOUS
Status: DISPENSED
Start: 2023-02-16

## (undated) RX ORDER — GINSENG 100 MG
CAPSULE ORAL
Status: DISPENSED
Start: 2022-07-20

## (undated) RX ORDER — CEFTRIAXONE SODIUM 1 G/50ML
INJECTION, SOLUTION INTRAVENOUS
Status: DISPENSED
Start: 2019-01-23

## (undated) RX ORDER — NEOSTIGMINE METHYLSULFATE 0.5 MG/ML
INJECTION INTRAVENOUS
Status: DISPENSED
Start: 2023-02-16

## (undated) RX ORDER — LIDOCAINE HYDROCHLORIDE AND EPINEPHRINE 10; 10 MG/ML; UG/ML
INJECTION, SOLUTION INFILTRATION; PERINEURAL
Status: DISPENSED
Start: 2022-07-20

## (undated) RX ORDER — VECURONIUM BROMIDE 1 MG/ML
INJECTION, POWDER, LYOPHILIZED, FOR SOLUTION INTRAVENOUS
Status: DISPENSED
Start: 2023-02-16

## (undated) RX ORDER — CEFTRIAXONE SODIUM 1 G
VIAL (EA) INJECTION
Status: DISPENSED
Start: 2018-03-05

## (undated) RX ORDER — TRAMADOL HYDROCHLORIDE 50 MG/1
TABLET ORAL
Status: DISPENSED
Start: 2018-03-05

## (undated) RX ORDER — FENTANYL CITRATE-0.9 % NACL/PF 10 MCG/ML
PLASTIC BAG, INJECTION (ML) INTRAVENOUS
Status: DISPENSED
Start: 2023-02-16

## (undated) RX ORDER — LIDOCAINE HYDROCHLORIDE 10 MG/ML
INJECTION, SOLUTION INFILTRATION; PERINEURAL
Status: DISPENSED
Start: 2018-03-05

## (undated) RX ORDER — MYCOPHENOLATE MOFETIL 500 MG/1
TABLET ORAL
Status: DISPENSED
Start: 2023-02-17

## (undated) RX ORDER — SODIUM CHLORIDE 9 MG/ML
INJECTION, SOLUTION INTRAVENOUS
Status: DISPENSED
Start: 2019-01-22

## (undated) RX ORDER — NEOMYCIN/BACITRACIN/POLYMYXINB 3.5-400-5K
OINTMENT (GRAM) TOPICAL
Status: DISPENSED
Start: 2022-07-20

## (undated) RX ORDER — BUPIVACAINE HYDROCHLORIDE 2.5 MG/ML
INJECTION, SOLUTION EPIDURAL; INFILTRATION; INTRACAUDAL
Status: DISPENSED
Start: 2023-02-16

## (undated) RX ORDER — OXCARBAZEPINE 300 MG/1
TABLET, FILM COATED ORAL
Status: DISPENSED
Start: 2019-01-22